# Patient Record
Sex: MALE | Race: WHITE | Employment: OTHER | RURAL
[De-identification: names, ages, dates, MRNs, and addresses within clinical notes are randomized per-mention and may not be internally consistent; named-entity substitution may affect disease eponyms.]

---

## 2017-02-06 ENCOUNTER — HOSPITAL ENCOUNTER (OUTPATIENT)
Dept: OTHER | Age: 82
Discharge: HOME | End: 2017-02-06
Attending: INTERNAL MEDICINE | Admitting: INTERNAL MEDICINE

## 2017-02-06 LAB
ALBUMIN SERPL-MCNC: 4.7 G/DL (ref 3.4–4.8)
ALP BLD-CCNC: 61 U/L (ref 25–100)
ALT SERPL-CCNC: 27 U/L (ref 4–36)
AST SERPL-CCNC: 23 U/L (ref 8–33)
BILIRUB SERPL-MCNC: 0.4 MG/DL (ref 0.3–1.2)
BILIRUBIN DIRECT: <0.2 MG/DL (ref 0–0.2)
BILIRUBIN, INDIRECT: NORMAL MG/DL (ref 0.2–0.8)
CHOLESTEROL, TOTAL: 128 MG/DL (ref 0–200)
HDLC SERPL-MCNC: 46 MG/DL (ref 40–60)
LDL CHOLESTEROL CALCULATED: 53 MG/DL
TOTAL PROTEIN: 7 G/DL (ref 6.4–8.3)
TRIGL SERPL-MCNC: 145 MG/DL (ref 0–249)
VLDLC SERPL CALC-MCNC: 29 MG/DL

## 2017-04-03 ENCOUNTER — HOSPITAL ENCOUNTER (OUTPATIENT)
Dept: OTHER | Age: 82
Discharge: HOME | End: 2017-04-03
Attending: FAMILY MEDICINE | Admitting: FAMILY MEDICINE

## 2017-04-03 DIAGNOSIS — R06.02 BREATH SHORTNESS: ICD-10-CM

## 2017-10-19 ENCOUNTER — OFFICE VISIT (OUTPATIENT)
Dept: CARDIOLOGY | Facility: CLINIC | Age: 82
End: 2017-10-19

## 2017-10-19 VITALS
SYSTOLIC BLOOD PRESSURE: 130 MMHG | OXYGEN SATURATION: 98 % | HEART RATE: 59 BPM | WEIGHT: 168.4 LBS | HEIGHT: 66 IN | BODY MASS INDEX: 27.06 KG/M2 | DIASTOLIC BLOOD PRESSURE: 70 MMHG

## 2017-10-19 DIAGNOSIS — E78.5 HYPERLIPIDEMIA LDL GOAL <100: ICD-10-CM

## 2017-10-19 DIAGNOSIS — R00.1 BRADYCARDIA: ICD-10-CM

## 2017-10-19 DIAGNOSIS — I48.20 CHRONIC ATRIAL FIBRILLATION (HCC): Primary | ICD-10-CM

## 2017-10-19 PROCEDURE — 99213 OFFICE O/P EST LOW 20 MIN: CPT | Performed by: INTERNAL MEDICINE

## 2017-10-19 RX ORDER — LACTOBACILLUS RHAMNOSUS GG 10B CELL
CAPSULE ORAL DAILY
COMMUNITY
End: 2018-08-26

## 2017-10-19 NOTE — PROGRESS NOTES
Sly Miranda  1935  [phone]      10/19/2017    Ahmet Benavidez MD    Chief Complaint   Patient presents with   • Atrial Fibrillation     PROBLEM LIST:  1.  Chronic atrial fibrillation:  a. MAXIM cardioversion, 10/22/2013,  Dr. Huynh:  LVEF 55% with trace TR, mild MR.  Successful external cardioversion to sinus rhythm.  Eliquis initiated with propafenone 150 mg q.12 h.   b. EKG, 02/24/2013, revealing normal sinus rhythm at a rate of 64 beats per minute with a normal  QTC.   c. CHADS score of 1.  d. Transesophageal echocardiogram in preparation for cardioversion, 09/09/2013, Dr. Huynh:  LVEF in the lower limits of normal with a large amount of spontaneous contrast and the appearance of loosely formed thrombus  in the left atrial appendage.  Eliquis initiated.  e. Propafenone discontinued November 2013 secondary to ineffectiveness.   2. Bradycardia:  a. Recorded in 2008 with a history of intolerance  to AV mele blocking agents.  3. Dyslipidemia, on statin therapy.  4. Abdominal pain prompting admission to [facility], 09/06/2013:  a.  CT scan of the abdomen and pelvis showing no acute abnormality, 09/06/2013.  5. Mesenteric calcified mass:  a. Recent CT scan, June 2010,  by Dr. Aguilera, unchanged from prior.  6. Solitary pulmonary nodule:  a. X ray chest pa and lateral, 9/6/2016:  Chronic change; no active disease.  7. Gastroesophageal reflux  disease.  8. Surgical history:  a. Hernia surgery, 10/20/1997.  b.  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 Diaz.      No Known Allergies    Current Medications:      Current Outpatient Prescriptions:   •  aspirin 81 MG EC tablet, Take 81 mg by mouth daily., Disp: , Rfl:   •  B Complex Vitamins (VITAMIN B COMPLEX) capsule capsule, Take  by mouth Daily., Disp: , Rfl:   •  CALCIUM-MAGNESIUM-VITAMIN D PO, Take  by mouth 2 (Two) Times a Day., Disp: , Rfl:   •  cholecalciferol (VITAMIN D3) 1000 UNITS tablet, Take  1,000 Units by mouth Daily., Disp: , Rfl:   •  Coenzyme Q10 (COQ10) 400 MG capsule, Take  by mouth Daily., Disp: , Rfl:   •  colestipol (COLESTID) 1 G tablet, 2 g Daily., Disp: , Rfl:   •  ELIQUIS 5 MG tablet tablet, Every 12 (Twelve) Hours., Disp: , Rfl:   •  Fexofenadine HCl (ALLEGRA PO), Take 180 mg by mouth Daily., Disp: , Rfl:   •  Glucosamine-Chondroitin-MSM ([employer] GLUCOSAMINE-CHONDROIT-MSM PO), Take  by mouth Daily., Disp: , Rfl:   •  methocarbamol (ROBAXIN) 500 MG tablet, Take 500 mg by mouth Daily As Needed for muscle spasms., Disp: , Rfl:   •  omeprazole (PriLOSEC) 40 MG capsule, Daily., Disp: , Rfl:   •  probiotic (CULTURELLE) capsule capsule, Take  by mouth Daily., Disp: , Rfl:   •  rosuvastatin (CRESTOR) 20 MG tablet, Take 1 tablet by mouth Daily., Disp: 90 tablet, Rfl: 1  •  triamcinolone (KENALOG) 0.1 % cream, As Needed., Disp: , Rfl:   •  Unable to find, 1 each 2 (Two) Times a Day. Med Name: Antiva, Disp: , Rfl:   •  vitamin A 8000 UNIT capsule, Take 8,000 Units by mouth Daily., Disp: , Rfl:   •  Zinc 50 MG capsule, Take  by mouth Daily., Disp: , Rfl:     HPI    Sly Miranda is a pleasant 82-year-old white male who presents today for 12 month follow up of chronic atrial fibrillation and dyslipidemia. Since last visit, patient has been doing well from a cardiac standpoint.  He admits that he notices occasional shortness of breath only when bending over to tie his shoes.  He never notices that with any exertional activity such as chopping wood, mowing the yard, or stacking wood.  He states that his blood pressures always run within normal parameters at home.  He had his cholesterol checked recently with his primary care physician.  He currently denies having any chest pain, dyspnea on exertion, palpitations, edema, fatigue, dizziness, and syncope.  Upon auscultation today he does sound to be in a bradycardic rate without evidence of any skipped beats or atrial fibrillation.  He states that he was  "told this previously in his primary care physician's office a couple months ago as well.  He will however, need to continue on all current medications in case he should happen to go back into atrial fibrillation.    The following portions of the patient's history were reviewed and updated as appropriate: allergies, current medications and problem list.    Pertinent positives as listed in the HPI.  All other systems reviewed are negative.    Vitals:    10/19/17 1038   BP: 130/70   BP Location: Left arm   Patient Position: Sitting   Pulse: 59   SpO2: 98%   Weight: 168 lb 6.4 oz (76.4 kg)   Height: 66\" (167.6 cm)       Physical Exam:  GENERAL: well-developed, well-nourished; in no acute distress.   NECK:  There is no jugular venous distention at 30°.  Carotid upstrokes are 2+ and  symmetrical without bruits.   LUNGS: Clear to auscultation bilaterally without wheezing, rhonchi, or rales noted.   CARDIOVASCULAR: The heart has a regular bradycardic rate with a normal S1 and S2. There is no murmur, gallop, rub, or click appreciated. The PMI is nondisplaced.   ABDOMEN: Soft and nontender  NEUROLOGICAL: Nonfocal; Alert and oriented  PERIPHERAL VASCULAR:  Posterior tibial and dorsalis pedis pulses are 2+ and symmetrical. There is no peripheral edema.   MUSCULOSKELETAL:  Normal ROM  SKIN:  Warm and dry  PSYCHIATRIC: normal mood and affect; behavior appropriate    Diagnostic Data:    Procedures    Assessment:      ICD-10-CM ICD-9-CM   1. Chronic atrial fibrillation- currently NSR I48.2 427.31   2. Hyperlipidemia LDL goal <100- per PCP E78.5 272.4   3. Bradycardia- asymptomatic R00.1 427.89       Plan:    1. Continue current medications.  2. F/up in 12 months or sooner if needed.    Seen independently by APRYL Rogers on October 19, 2017 at 1055      "

## 2017-12-18 ENCOUNTER — HOSPITAL ENCOUNTER (OUTPATIENT)
Dept: GENERAL RADIOLOGY | Age: 82
Discharge: HOME | End: 2017-12-18
Attending: FAMILY MEDICINE | Admitting: FAMILY MEDICINE

## 2017-12-18 DIAGNOSIS — R09.89 BRUIT: ICD-10-CM

## 2018-02-22 ENCOUNTER — TELEPHONE (OUTPATIENT)
Dept: CARDIOLOGY | Facility: CLINIC | Age: 83
End: 2018-02-22

## 2018-02-22 DIAGNOSIS — R94.31 ABNORMAL EKG: ICD-10-CM

## 2018-02-22 DIAGNOSIS — R00.0 TACHYCARDIA: Primary | ICD-10-CM

## 2018-02-22 RX ORDER — METOPROLOL SUCCINATE 25 MG/1
25 TABLET, EXTENDED RELEASE ORAL DAILY
Qty: 90 TABLET | Refills: 3 | Status: SHIPPED | OUTPATIENT
Start: 2018-02-22 | End: 2018-08-26

## 2018-02-22 NOTE — TELEPHONE ENCOUNTER
PT's wife calls to report that he has been having episodes of Heart Rates that are getting up to 160-173- they did not go tot he ER and they did not contact me until today- she reports that his HR today is 80-85 and he feels ok- I instructed her that anytime that his HR is greater than 100, she needs to contact me so that I can fax an order to [facility] for an EKG- if it happens to be after hours, they need to go to the ER for eval-     Want to add anything for rate?

## 2018-02-22 NOTE — TELEPHONE ENCOUNTER
Reviewed by Dr. Huynh- suggests Metoprolol XL 25 mg daily- med sent to pharmacy and PT's wife notified- she will call me if this does not help his episodes.

## 2018-03-05 ENCOUNTER — HOSPITAL ENCOUNTER (OUTPATIENT)
Dept: OTHER | Age: 83
Discharge: HOME | End: 2018-03-05
Attending: INTERNAL MEDICINE | Admitting: INTERNAL MEDICINE

## 2018-03-07 ENCOUNTER — OUTSIDE FACILITY SERVICE (OUTPATIENT)
Dept: CARDIOLOGY | Facility: CLINIC | Age: 83
End: 2018-03-07

## 2018-03-07 ENCOUNTER — HOSPITAL ENCOUNTER (OUTPATIENT)
Dept: NON INVASIVE DIAGNOSTICS | Age: 83
Discharge: HOME | End: 2018-03-07
Attending: INTERNAL MEDICINE | Admitting: INTERNAL MEDICINE

## 2018-03-07 DIAGNOSIS — R00.0 TACHYCARDIA: ICD-10-CM

## 2018-03-07 LAB
BUN BLDV-MCNC: 20 MG/DL (ref 6–20)
CREAT SERPL-MCNC: 1.2 MG/DL (ref 0.4–1.2)
GFR AFRICAN AMERICAN: >59
GFR NON-AFRICAN AMERICAN: 58

## 2018-03-07 PROCEDURE — 93306 TTE W/DOPPLER COMPLETE: CPT | Performed by: INTERNAL MEDICINE

## 2018-03-09 ENCOUNTER — HOSPITAL ENCOUNTER (OUTPATIENT)
Dept: OTHER | Age: 83
Discharge: HOME | End: 2018-03-09
Attending: INTERNAL MEDICINE | Admitting: INTERNAL MEDICINE

## 2018-03-09 LAB
A/G RATIO: 1.8 (ref 0.8–2)
ALBUMIN SERPL-MCNC: 4.3 G/DL (ref 3.4–4.8)
ALP BLD-CCNC: 65 U/L (ref 25–100)
ALT SERPL-CCNC: 23 U/L (ref 4–36)
ANION GAP SERPL CALCULATED.3IONS-SCNC: 13 MMOL/L (ref 3–16)
AST SERPL-CCNC: 19 U/L (ref 8–33)
BASOPHILS ABSOLUTE: 0 K/UL (ref 0–0.1)
BASOPHILS RELATIVE PERCENT: 0.6 %
BILIRUB SERPL-MCNC: 0.4 MG/DL (ref 0.3–1.2)
BUN BLDV-MCNC: 18 MG/DL (ref 6–20)
CALCIUM SERPL-MCNC: 9.1 MG/DL (ref 8.5–10.5)
CHLORIDE BLD-SCNC: 102 MMOL/L (ref 98–107)
CO2: 25 MMOL/L (ref 20–30)
CREAT SERPL-MCNC: 1.1 MG/DL (ref 0.4–1.2)
EOSINOPHILS ABSOLUTE: 0.3 K/UL (ref 0–0.4)
EOSINOPHILS RELATIVE PERCENT: 4.1 %
GFR AFRICAN AMERICAN: >59
GFR NON-AFRICAN AMERICAN: >59
GLOBULIN: 2.4 G/DL
GLUCOSE BLD-MCNC: 112 MG/DL (ref 74–106)
HCT VFR BLD CALC: 43.3 % (ref 40–54)
HEMOGLOBIN: 14.3 G/DL (ref 13–18)
IMMATURE GRANULOCYTES #: 0 K/UL
IMMATURE GRANULOCYTES %: 0.5 % (ref 0–5)
LYMPHOCYTES ABSOLUTE: 1.8 K/UL (ref 1.5–4)
LYMPHOCYTES RELATIVE PERCENT: 28.6 %
MCH RBC QN AUTO: 28.7 PG (ref 27–32)
MCHC RBC AUTO-ENTMCNC: 33 G/DL (ref 31–35)
MCV RBC AUTO: 86.8 FL (ref 80–100)
MONOCYTES ABSOLUTE: 0.7 K/UL (ref 0.2–0.8)
MONOCYTES RELATIVE PERCENT: 10.8 %
NEUTROPHILS ABSOLUTE: 3.5 K/UL (ref 2–7.5)
NEUTROPHILS RELATIVE PERCENT: 55.4 %
PDW BLD-RTO: 13.1 % (ref 11–16)
PLATELET # BLD: 235 K/UL (ref 150–400)
PMV BLD AUTO: 10.4 FL (ref 6–10)
POTASSIUM SERPL-SCNC: 4.7 MMOL/L (ref 3.4–5.1)
RBC # BLD: 4.99 M/UL (ref 4.5–6)
SODIUM BLD-SCNC: 140 MMOL/L (ref 136–145)
TOTAL PROTEIN: 6.7 G/DL (ref 6.4–8.3)
WBC # BLD: 6.4 K/UL (ref 4–11)

## 2018-03-09 RX ORDER — DIGOXIN 125 MCG
125 TABLET ORAL DAILY
Qty: 30 TABLET | Refills: 11 | Status: SHIPPED | OUTPATIENT
Start: 2018-03-09 | End: 2018-08-26

## 2018-03-12 ENCOUNTER — TELEPHONE (OUTPATIENT)
Dept: CARDIOLOGY | Facility: CLINIC | Age: 83
End: 2018-03-12

## 2018-03-12 NOTE — TELEPHONE ENCOUNTER
PT did not start digoxin over the weekend bc his HR was running 55-65 and he felt good- he will continue Metoprolol as ordered and call me if he needs anything further-

## 2018-03-13 NOTE — TELEPHONE ENCOUNTER
"PT's wife calls to report that his HR has been running 40's - she states that he has been feeling a little dizzy but didn't know if it was the med or his \"prostate pill\" that he is taking- will have him to decrease Metoprolol to 25 mg 1/2 pill daily and call me next week or sooner with an update  "

## 2018-03-20 ENCOUNTER — OUTSIDE FACILITY SERVICE (OUTPATIENT)
Dept: CARDIOLOGY | Facility: CLINIC | Age: 83
End: 2018-03-20

## 2018-03-20 ENCOUNTER — HOSPITAL ENCOUNTER (OUTPATIENT)
Dept: OTHER | Age: 83
Discharge: HOME | End: 2018-03-20
Attending: INTERNAL MEDICINE | Admitting: INTERNAL MEDICINE

## 2018-03-20 PROCEDURE — 93010 ELECTROCARDIOGRAM REPORT: CPT | Performed by: INTERNAL MEDICINE

## 2018-03-22 ENCOUNTER — TELEPHONE (OUTPATIENT)
Dept: CARDIOLOGY | Facility: CLINIC | Age: 83
End: 2018-03-22

## 2018-03-22 NOTE — TELEPHONE ENCOUNTER
Sent PT for EKG because he was having HR in the 40's - Dr. Huynh reviewed EKG and suggests stopping Metoprolol and he may take as needed for tachycardia- relayed to PT/wife and she verbalized understanding-

## 2018-08-26 ENCOUNTER — APPOINTMENT (OUTPATIENT)
Dept: GENERAL RADIOLOGY | Facility: HOSPITAL | Age: 83
End: 2018-08-26

## 2018-08-26 ENCOUNTER — HOSPITAL ENCOUNTER (EMERGENCY)
Facility: HOSPITAL | Age: 83
Discharge: HOME | End: 2018-08-26
Attending: EMERGENCY MEDICINE | Admitting: EMERGENCY MEDICINE

## 2018-08-26 VITALS
DIASTOLIC BLOOD PRESSURE: 57 MMHG | HEIGHT: 67 IN | BODY MASS INDEX: 26.68 KG/M2 | TEMPERATURE: 97.8 F | RESPIRATION RATE: 20 BRPM | OXYGEN SATURATION: 99 % | SYSTOLIC BLOOD PRESSURE: 89 MMHG | WEIGHT: 170 LBS | HEART RATE: 54 BPM

## 2018-08-26 DIAGNOSIS — I48.91 ATRIAL FIBRILLATION, UNSPECIFIED TYPE (HCC): ICD-10-CM

## 2018-08-26 DIAGNOSIS — I48.20 CHRONIC ATRIAL FIBRILLATION (HCC): Primary | ICD-10-CM

## 2018-08-26 LAB
ALBUMIN SERPL-MCNC: 4.33 G/DL (ref 3.2–4.8)
ALBUMIN/GLOB SERPL: 1.7 G/DL (ref 1.5–2.5)
ALP SERPL-CCNC: 68 U/L (ref 25–100)
ALT SERPL W P-5'-P-CCNC: 43 U/L (ref 7–40)
ANION GAP SERPL CALCULATED.3IONS-SCNC: 10 MMOL/L (ref 3–11)
AST SERPL-CCNC: 32 U/L (ref 0–33)
BASOPHILS # BLD AUTO: 0.03 10*3/MM3 (ref 0–0.2)
BASOPHILS NFR BLD AUTO: 0.4 % (ref 0–1)
BILIRUB SERPL-MCNC: 0.4 MG/DL (ref 0.3–1.2)
BNP SERPL-MCNC: 139 PG/ML (ref 0–100)
BUN BLD-MCNC: 15 MG/DL (ref 9–23)
BUN/CREAT SERPL: 13.6 (ref 7–25)
CALCIUM SPEC-SCNC: 9.2 MG/DL (ref 8.7–10.4)
CHLORIDE SERPL-SCNC: 107 MMOL/L (ref 99–109)
CO2 SERPL-SCNC: 24 MMOL/L (ref 20–31)
CREAT BLD-MCNC: 1.1 MG/DL (ref 0.6–1.3)
DEPRECATED RDW RBC AUTO: 41.7 FL (ref 37–54)
DIGOXIN SERPL-MCNC: 0.06 NG/ML (ref 0.8–2)
EOSINOPHIL # BLD AUTO: 0.23 10*3/MM3 (ref 0–0.3)
EOSINOPHIL NFR BLD AUTO: 2.7 % (ref 0–3)
ERYTHROCYTE [DISTWIDTH] IN BLOOD BY AUTOMATED COUNT: 13.5 % (ref 11.3–14.5)
GFR SERPL CREATININE-BSD FRML MDRD: 64 ML/MIN/1.73
GLOBULIN UR ELPH-MCNC: 2.6 GM/DL
GLUCOSE BLD-MCNC: 111 MG/DL (ref 70–100)
HCT VFR BLD AUTO: 46.6 % (ref 38.9–50.9)
HGB BLD-MCNC: 15.8 G/DL (ref 13.1–17.5)
HOLD SPECIMEN: NORMAL
HOLD SPECIMEN: NORMAL
IMM GRANULOCYTES # BLD: 0.02 10*3/MM3 (ref 0–0.03)
IMM GRANULOCYTES NFR BLD: 0.2 % (ref 0–0.6)
LYMPHOCYTES # BLD AUTO: 1.93 10*3/MM3 (ref 0.6–4.8)
LYMPHOCYTES NFR BLD AUTO: 23 % (ref 24–44)
MAGNESIUM SERPL-MCNC: 2 MG/DL (ref 1.3–2.7)
MCH RBC QN AUTO: 28.9 PG (ref 27–31)
MCHC RBC AUTO-ENTMCNC: 33.9 G/DL (ref 32–36)
MCV RBC AUTO: 85.2 FL (ref 80–99)
MONOCYTES # BLD AUTO: 0.9 10*3/MM3 (ref 0–1)
MONOCYTES NFR BLD AUTO: 10.7 % (ref 0–12)
NEUTROPHILS # BLD AUTO: 5.29 10*3/MM3 (ref 1.5–8.3)
NEUTROPHILS NFR BLD AUTO: 63.2 % (ref 41–71)
PLATELET # BLD AUTO: 222 10*3/MM3 (ref 150–450)
PMV BLD AUTO: 10.3 FL (ref 6–12)
POTASSIUM BLD-SCNC: 4.2 MMOL/L (ref 3.5–5.5)
PROT SERPL-MCNC: 6.9 G/DL (ref 5.7–8.2)
RBC # BLD AUTO: 5.47 10*6/MM3 (ref 4.2–5.76)
SODIUM BLD-SCNC: 141 MMOL/L (ref 132–146)
TROPONIN I SERPL-MCNC: 0.01 NG/ML (ref 0–0.07)
TROPONIN I SERPL-MCNC: 0.06 NG/ML (ref 0–0.07)
TSH SERPL DL<=0.05 MIU/L-ACNC: 3.34 MIU/ML (ref 0.35–5.35)
WBC NRBC COR # BLD: 8.38 10*3/MM3 (ref 3.5–10.8)
WHOLE BLOOD HOLD SPECIMEN: NORMAL
WHOLE BLOOD HOLD SPECIMEN: NORMAL

## 2018-08-26 PROCEDURE — 83880 ASSAY OF NATRIURETIC PEPTIDE: CPT | Performed by: EMERGENCY MEDICINE

## 2018-08-26 PROCEDURE — 25010000002 ADENOSINE PER 6 MG

## 2018-08-26 PROCEDURE — 99284 EMERGENCY DEPT VISIT MOD MDM: CPT

## 2018-08-26 PROCEDURE — 80162 ASSAY OF DIGOXIN TOTAL: CPT | Performed by: EMERGENCY MEDICINE

## 2018-08-26 PROCEDURE — 96365 THER/PROPH/DIAG IV INF INIT: CPT

## 2018-08-26 PROCEDURE — 96376 TX/PRO/DX INJ SAME DRUG ADON: CPT

## 2018-08-26 PROCEDURE — 80053 COMPREHEN METABOLIC PANEL: CPT | Performed by: EMERGENCY MEDICINE

## 2018-08-26 PROCEDURE — 84443 ASSAY THYROID STIM HORMONE: CPT | Performed by: EMERGENCY MEDICINE

## 2018-08-26 PROCEDURE — 99284 EMERGENCY DEPT VISIT MOD MDM: CPT | Performed by: INTERNAL MEDICINE

## 2018-08-26 PROCEDURE — 96366 THER/PROPH/DIAG IV INF ADDON: CPT

## 2018-08-26 PROCEDURE — 84484 ASSAY OF TROPONIN QUANT: CPT

## 2018-08-26 PROCEDURE — 85025 COMPLETE CBC W/AUTO DIFF WBC: CPT | Performed by: EMERGENCY MEDICINE

## 2018-08-26 PROCEDURE — 93005 ELECTROCARDIOGRAM TRACING: CPT | Performed by: EMERGENCY MEDICINE

## 2018-08-26 PROCEDURE — 83735 ASSAY OF MAGNESIUM: CPT | Performed by: EMERGENCY MEDICINE

## 2018-08-26 PROCEDURE — 71045 X-RAY EXAM CHEST 1 VIEW: CPT

## 2018-08-26 RX ORDER — FLECAINIDE ACETATE 50 MG/1
50 TABLET ORAL 2 TIMES DAILY
Qty: 180 TABLET | Refills: 3 | Status: SHIPPED | OUTPATIENT
Start: 2018-08-26 | End: 2018-09-07

## 2018-08-26 RX ORDER — ESMOLOL HYDROCHLORIDE 10 MG/ML
500 INJECTION INTRAVENOUS ONCE
Status: DISCONTINUED | OUTPATIENT
Start: 2018-08-26 | End: 2018-08-26

## 2018-08-26 RX ORDER — PRAMIPEXOLE DIHYDROCHLORIDE 0.25 MG/1
0.25 TABLET ORAL NIGHTLY
COMMUNITY
End: 2021-04-01

## 2018-08-26 RX ORDER — SODIUM CHLORIDE 0.9 % (FLUSH) 0.9 %
10 SYRINGE (ML) INJECTION AS NEEDED
Status: DISCONTINUED | OUTPATIENT
Start: 2018-08-26 | End: 2018-08-26 | Stop reason: HOSPADM

## 2018-08-26 RX ORDER — ESMOLOL HYDROCHLORIDE 10 MG/ML
50-300 INJECTION, SOLUTION INTRAVENOUS
Status: DISCONTINUED | OUTPATIENT
Start: 2018-08-26 | End: 2018-08-26

## 2018-08-26 RX ORDER — DILTIAZEM HCL IN NACL,ISO-OSM 125 MG/125
5 PLASTIC BAG, INJECTION (ML) INTRAVENOUS
Status: DISCONTINUED | OUTPATIENT
Start: 2018-08-26 | End: 2018-08-26 | Stop reason: HOSPADM

## 2018-08-26 RX ORDER — FLECAINIDE ACETATE 150 MG/1
300 TABLET ORAL EVERY 12 HOURS SCHEDULED
Status: DISCONTINUED | OUTPATIENT
Start: 2018-08-26 | End: 2018-08-26

## 2018-08-26 RX ORDER — FLECAINIDE ACETATE 150 MG/1
300 TABLET ORAL ONCE
Status: COMPLETED | OUTPATIENT
Start: 2018-08-26 | End: 2018-08-26

## 2018-08-26 RX ORDER — ADENOSINE 3 MG/ML
INJECTION, SOLUTION INTRAVENOUS
Status: COMPLETED
Start: 2018-08-26 | End: 2018-08-26

## 2018-08-26 RX ORDER — SILODOSIN 8 MG/1
8 CAPSULE ORAL
COMMUNITY
End: 2023-04-04

## 2018-08-26 RX ORDER — DILTIAZEM HYDROCHLORIDE 5 MG/ML
5 INJECTION INTRAVENOUS ONCE
Status: COMPLETED | OUTPATIENT
Start: 2018-08-26 | End: 2018-08-26

## 2018-08-26 RX ORDER — DILTIAZEM HCL IN NACL,ISO-OSM 125 MG/125
10 PLASTIC BAG, INJECTION (ML) INTRAVENOUS ONCE
Status: DISCONTINUED | OUTPATIENT
Start: 2018-08-26 | End: 2018-08-26

## 2018-08-26 RX ORDER — DILTIAZEM HCL IN NACL,ISO-OSM 125 MG/125
10 PLASTIC BAG, INJECTION (ML) INTRAVENOUS
Status: DISCONTINUED | OUTPATIENT
Start: 2018-08-26 | End: 2018-08-26

## 2018-08-26 RX ADMIN — ADENOSINE 6 MG: 3 INJECTION, SOLUTION INTRAVENOUS at 11:35

## 2018-08-26 RX ADMIN — DILTIAZEM HYDROCHLORIDE 5 MG/HR: 5 INJECTION INTRAVENOUS at 13:10

## 2018-08-26 RX ADMIN — DILTIAZEM HYDROCHLORIDE 5 MG: 5 INJECTION INTRAVENOUS at 11:54

## 2018-08-26 RX ADMIN — ADENOSINE 12 MG: 3 INJECTION, SOLUTION INTRAVENOUS at 11:41

## 2018-08-26 RX ADMIN — FLECAINIDE ACETATE 300 MG: 150 TABLET ORAL at 13:06

## 2018-08-28 ENCOUNTER — OUTSIDE FACILITY SERVICE (OUTPATIENT)
Dept: CARDIOLOGY | Facility: CLINIC | Age: 83
End: 2018-08-28

## 2018-08-28 ENCOUNTER — HOSPITAL ENCOUNTER (OUTPATIENT)
Facility: HOSPITAL | Age: 83
Discharge: HOME | End: 2018-08-28
Payer: MEDICARE

## 2018-08-28 PROCEDURE — 93000 ELECTROCARDIOGRAM COMPLETE: CPT | Performed by: INTERNAL MEDICINE

## 2018-08-29 ENCOUNTER — TELEPHONE (OUTPATIENT)
Dept: CARDIOLOGY | Facility: CLINIC | Age: 83
End: 2018-08-29

## 2018-08-29 NOTE — TELEPHONE ENCOUNTER
PT's HR is down in the 40's- he is taking Flecainide 50 mg BID and Lopressor 25 mg BID-  Will him to decrease to 12.5 mg BID-

## 2018-09-07 ENCOUNTER — TELEPHONE (OUTPATIENT)
Dept: CARDIOLOGY | Facility: CLINIC | Age: 83
End: 2018-09-07

## 2018-09-07 ENCOUNTER — HOSPITAL ENCOUNTER (INPATIENT)
Facility: HOSPITAL | Age: 83
LOS: 4 days | Discharge: HOME | End: 2018-09-11
Attending: EMERGENCY MEDICINE | Admitting: INTERNAL MEDICINE

## 2018-09-07 DIAGNOSIS — R55 SYNCOPE, UNSPECIFIED SYNCOPE TYPE: Primary | ICD-10-CM

## 2018-09-07 DIAGNOSIS — I48.0 PAROXYSMAL ATRIAL FIBRILLATION (HCC): ICD-10-CM

## 2018-09-07 LAB
ALBUMIN SERPL-MCNC: 4.35 G/DL (ref 3.2–4.8)
ALBUMIN/GLOB SERPL: 1.8 G/DL (ref 1.5–2.5)
ALP SERPL-CCNC: 64 U/L (ref 25–100)
ALT SERPL W P-5'-P-CCNC: 44 U/L (ref 7–40)
ANION GAP SERPL CALCULATED.3IONS-SCNC: 6 MMOL/L (ref 3–11)
ANION GAP SERPL CALCULATED.3IONS-SCNC: 7 MMOL/L (ref 3–11)
AST SERPL-CCNC: 34 U/L (ref 0–33)
BASOPHILS # BLD AUTO: 0.04 10*3/MM3 (ref 0–0.2)
BASOPHILS NFR BLD AUTO: 0.4 % (ref 0–1)
BILIRUB SERPL-MCNC: 0.4 MG/DL (ref 0.3–1.2)
BUN BLD-MCNC: 19 MG/DL (ref 9–23)
BUN BLD-MCNC: 19 MG/DL (ref 9–23)
BUN/CREAT SERPL: 17.1 (ref 7–25)
BUN/CREAT SERPL: 17.1 (ref 7–25)
CALCIUM SPEC-SCNC: 8.6 MG/DL (ref 8.7–10.4)
CALCIUM SPEC-SCNC: 8.9 MG/DL (ref 8.7–10.4)
CHLORIDE SERPL-SCNC: 103 MMOL/L (ref 99–109)
CHLORIDE SERPL-SCNC: 104 MMOL/L (ref 99–109)
CO2 SERPL-SCNC: 25 MMOL/L (ref 20–31)
CO2 SERPL-SCNC: 27 MMOL/L (ref 20–31)
CREAT BLD-MCNC: 1.11 MG/DL (ref 0.6–1.3)
CREAT BLD-MCNC: 1.11 MG/DL (ref 0.6–1.3)
DEPRECATED RDW RBC AUTO: 40.7 FL (ref 37–54)
EOSINOPHIL # BLD AUTO: 0.13 10*3/MM3 (ref 0–0.3)
EOSINOPHIL NFR BLD AUTO: 1.3 % (ref 0–3)
ERYTHROCYTE [DISTWIDTH] IN BLOOD BY AUTOMATED COUNT: 13.3 % (ref 11.3–14.5)
GFR SERPL CREATININE-BSD FRML MDRD: 63 ML/MIN/1.73
GFR SERPL CREATININE-BSD FRML MDRD: 63 ML/MIN/1.73
GLOBULIN UR ELPH-MCNC: 2.5 GM/DL
GLUCOSE BLD-MCNC: 106 MG/DL (ref 70–100)
GLUCOSE BLD-MCNC: 110 MG/DL (ref 70–100)
HCT VFR BLD AUTO: 46.8 % (ref 38.9–50.9)
HGB BLD-MCNC: 15.9 G/DL (ref 13.1–17.5)
HOLD SPECIMEN: NORMAL
HOLD SPECIMEN: NORMAL
IMM GRANULOCYTES # BLD: 0.02 10*3/MM3 (ref 0–0.03)
IMM GRANULOCYTES NFR BLD: 0.2 % (ref 0–0.6)
LYMPHOCYTES # BLD AUTO: 1.59 10*3/MM3 (ref 0.6–4.8)
LYMPHOCYTES NFR BLD AUTO: 16.5 % (ref 24–44)
MAGNESIUM SERPL-MCNC: 1.9 MG/DL (ref 1.3–2.7)
MAGNESIUM SERPL-MCNC: 1.9 MG/DL (ref 1.3–2.7)
MCH RBC QN AUTO: 28.7 PG (ref 27–31)
MCHC RBC AUTO-ENTMCNC: 34 G/DL (ref 32–36)
MCV RBC AUTO: 84.5 FL (ref 80–99)
MONOCYTES # BLD AUTO: 0.86 10*3/MM3 (ref 0–1)
MONOCYTES NFR BLD AUTO: 8.9 % (ref 0–12)
NEUTROPHILS # BLD AUTO: 7.04 10*3/MM3 (ref 1.5–8.3)
NEUTROPHILS NFR BLD AUTO: 72.9 % (ref 41–71)
PLATELET # BLD AUTO: 209 10*3/MM3 (ref 150–450)
PMV BLD AUTO: 10.1 FL (ref 6–12)
POTASSIUM BLD-SCNC: 4 MMOL/L (ref 3.5–5.5)
POTASSIUM BLD-SCNC: 4.2 MMOL/L (ref 3.5–5.5)
PROT SERPL-MCNC: 6.8 G/DL (ref 5.7–8.2)
RBC # BLD AUTO: 5.54 10*6/MM3 (ref 4.2–5.76)
SODIUM BLD-SCNC: 136 MMOL/L (ref 132–146)
SODIUM BLD-SCNC: 136 MMOL/L (ref 132–146)
TROPONIN I SERPL-MCNC: 0 NG/ML (ref 0–0.07)
TROPONIN I SERPL-MCNC: 0.01 NG/ML
WBC NRBC COR # BLD: 9.66 10*3/MM3 (ref 3.5–10.8)
WHOLE BLOOD HOLD SPECIMEN: NORMAL
WHOLE BLOOD HOLD SPECIMEN: NORMAL

## 2018-09-07 PROCEDURE — 99223 1ST HOSP IP/OBS HIGH 75: CPT | Performed by: INTERNAL MEDICINE

## 2018-09-07 PROCEDURE — 83735 ASSAY OF MAGNESIUM: CPT | Performed by: INTERNAL MEDICINE

## 2018-09-07 PROCEDURE — 84484 ASSAY OF TROPONIN QUANT: CPT | Performed by: EMERGENCY MEDICINE

## 2018-09-07 PROCEDURE — 83735 ASSAY OF MAGNESIUM: CPT | Performed by: EMERGENCY MEDICINE

## 2018-09-07 PROCEDURE — 93005 ELECTROCARDIOGRAM TRACING: CPT

## 2018-09-07 PROCEDURE — 80053 COMPREHEN METABOLIC PANEL: CPT | Performed by: EMERGENCY MEDICINE

## 2018-09-07 PROCEDURE — 85025 COMPLETE CBC W/AUTO DIFF WBC: CPT | Performed by: EMERGENCY MEDICINE

## 2018-09-07 PROCEDURE — 93005 ELECTROCARDIOGRAM TRACING: CPT | Performed by: EMERGENCY MEDICINE

## 2018-09-07 PROCEDURE — 99285 EMERGENCY DEPT VISIT HI MDM: CPT

## 2018-09-07 PROCEDURE — 84484 ASSAY OF TROPONIN QUANT: CPT

## 2018-09-07 RX ORDER — POTASSIUM CHLORIDE 750 MG/1
40 CAPSULE, EXTENDED RELEASE ORAL AS NEEDED
Status: DISCONTINUED | OUTPATIENT
Start: 2018-09-07 | End: 2018-09-11 | Stop reason: HOSPADM

## 2018-09-07 RX ORDER — ROSUVASTATIN CALCIUM 20 MG/1
20 TABLET, COATED ORAL DAILY
Status: DISCONTINUED | OUTPATIENT
Start: 2018-09-08 | End: 2018-09-11 | Stop reason: HOSPADM

## 2018-09-07 RX ORDER — FEXOFENADINE HCL 180 MG/1
180 TABLET ORAL DAILY
COMMUNITY
End: 2018-09-18

## 2018-09-07 RX ORDER — PRAMIPEXOLE DIHYDROCHLORIDE 0.25 MG/1
0.25 TABLET ORAL NIGHTLY
Status: DISCONTINUED | OUTPATIENT
Start: 2018-09-07 | End: 2018-09-11 | Stop reason: HOSPADM

## 2018-09-07 RX ORDER — CETIRIZINE HYDROCHLORIDE 10 MG/1
10 TABLET ORAL DAILY
Status: DISCONTINUED | OUTPATIENT
Start: 2018-09-08 | End: 2018-09-11 | Stop reason: HOSPADM

## 2018-09-07 RX ORDER — POTASSIUM CHLORIDE 1.5 G/1.77G
40 POWDER, FOR SOLUTION ORAL AS NEEDED
Status: DISCONTINUED | OUTPATIENT
Start: 2018-09-07 | End: 2018-09-11 | Stop reason: HOSPADM

## 2018-09-07 RX ORDER — MAGNESIUM SULFATE HEPTAHYDRATE 40 MG/ML
4 INJECTION, SOLUTION INTRAVENOUS AS NEEDED
Status: DISCONTINUED | OUTPATIENT
Start: 2018-09-07 | End: 2018-09-11 | Stop reason: HOSPADM

## 2018-09-07 RX ORDER — MELATONIN
1000 DAILY
Status: DISCONTINUED | OUTPATIENT
Start: 2018-09-08 | End: 2018-09-11 | Stop reason: HOSPADM

## 2018-09-07 RX ORDER — MAGNESIUM SULFATE HEPTAHYDRATE 40 MG/ML
2 INJECTION, SOLUTION INTRAVENOUS AS NEEDED
Status: DISCONTINUED | OUTPATIENT
Start: 2018-09-07 | End: 2018-09-11 | Stop reason: HOSPADM

## 2018-09-07 RX ORDER — SODIUM CHLORIDE 0.9 % (FLUSH) 0.9 %
1-10 SYRINGE (ML) INJECTION AS NEEDED
Status: DISCONTINUED | OUTPATIENT
Start: 2018-09-07 | End: 2018-09-11 | Stop reason: HOSPADM

## 2018-09-07 RX ORDER — TAMSULOSIN HYDROCHLORIDE 0.4 MG/1
0.4 CAPSULE ORAL NIGHTLY
Status: DISCONTINUED | OUTPATIENT
Start: 2018-09-07 | End: 2018-09-11 | Stop reason: HOSPADM

## 2018-09-07 RX ORDER — SODIUM CHLORIDE 0.9 % (FLUSH) 0.9 %
10 SYRINGE (ML) INJECTION AS NEEDED
Status: DISCONTINUED | OUTPATIENT
Start: 2018-09-07 | End: 2018-09-11 | Stop reason: HOSPADM

## 2018-09-07 RX ORDER — DOFETILIDE 0.25 MG/1
250 CAPSULE ORAL EVERY 12 HOURS SCHEDULED
Status: DISCONTINUED | OUTPATIENT
Start: 2018-09-07 | End: 2018-09-11 | Stop reason: HOSPADM

## 2018-09-07 RX ORDER — PANTOPRAZOLE SODIUM 40 MG/1
40 TABLET, DELAYED RELEASE ORAL EVERY MORNING
Status: DISCONTINUED | OUTPATIENT
Start: 2018-09-08 | End: 2018-09-11 | Stop reason: HOSPADM

## 2018-09-07 RX ORDER — ASPIRIN 81 MG/1
81 TABLET ORAL DAILY
Status: DISCONTINUED | OUTPATIENT
Start: 2018-09-08 | End: 2018-09-11 | Stop reason: HOSPADM

## 2018-09-07 RX ADMIN — APIXABAN 5 MG: 5 TABLET, FILM COATED ORAL at 20:36

## 2018-09-07 RX ADMIN — TAMSULOSIN HYDROCHLORIDE 0.4 MG: 0.4 CAPSULE ORAL at 20:36

## 2018-09-07 RX ADMIN — PRAMIPEXOLE DIHYDROCHLORIDE 0.25 MG: 0.25 TABLET ORAL at 20:36

## 2018-09-07 RX ADMIN — DOFETILIDE 250 MCG: 0.25 CAPSULE ORAL at 20:36

## 2018-09-07 NOTE — ED PROVIDER NOTES
Subjective   Sly Miranda is a 83 y.o.male who presents to the ED with his family by EMS with c/o syncope with onset at 1100 that has resolved. He was seen here at [facility] ED on 8/26/18 for chronic A-fib. He was at his follow up appointment at his PCP's office. He was laying down after receiving an EKG then experienced a warm sensation then states that everything went bright then dark then had a syncopal episode. He states that it felt like a few seconds but his family is unsure how long the episode lasted. Per his wife, his PCP walked into the room has then event was going on. She states that she saw tachycardia on the monitor and that he was very pale with blue lips. He came to on the table with his PCP at his side. He experienced slight dyspnea but denies any chest pain, palpitations, diaphoresis, nausea or abdominal pain. He has a Zio patch on and has an appointment with Dr. Huynh, Cardiology on November 1, 2018. This is his first syncopal episode. He has a h/o Afib on Eliquis.               History provided by:  Patient and spouse  Syncope   Episode history:  Single  Most recent episode:  Today  Timing:  Constant  Progression:  Resolved  Chronicity:  New  Context: sitting down    Context: not medication change    Witnessed: yes    Relieved by:  Nothing  Worsened by:  Nothing  Ineffective treatments:  None tried  Associated symptoms: shortness of breath    Associated symptoms: no chest pain, no diaphoresis, no nausea, no palpitations and no vomiting    Risk factors: no congenital heart disease, no coronary artery disease and no seizures    Risk factors comment:  Afib      Review of Systems   Constitutional: Negative for diaphoresis.   Eyes: Positive for visual disturbance.   Respiratory: Positive for shortness of breath.    Cardiovascular: Positive for syncope. Negative for chest pain, palpitations and leg swelling.   Gastrointestinal: Negative for abdominal pain, nausea and vomiting.   Skin: Positive  for pallor.   Neurological: Positive for syncope.   All other systems reviewed and are negative.      Past Medical History:   Diagnosis Date   • Bradycardia     Recorded in 2008 with a history of intolerance to AV mele blocking agents.   • Calcified mesenteric mass     Recent CT scan, June 2010, by Dr. Aguilera, unchanged from prior.   • Chronic atrial fibrillation (CMS/HCC)     on Eliquis 5 mg b.i.d.    • Dyslipidemia     , followed by Dr. Benavidez. on statin therapy.   • GERD (gastroesophageal reflux disease)        No Known Allergies    Past Surgical History:   Procedure Laterality Date   • HERNIA REPAIR  10/20/1997   • KNEE SURGERY Left    • TOTAL KNEE ARTHROPLASTY Right 03/06/2014    Right partial total knee replacement       History reviewed. No pertinent family history.    Social History     Social History   • Marital status:      Social History Main Topics   • Smoking status: Former Smoker     Types: Cigars   • Smokeless tobacco: Never Used      Comment: quit over 40 years ago   • Alcohol use No   • Drug use: No     Other Topics Concern   • Not on file         Objective   Physical Exam   Constitutional: He is oriented to person, place, and time. He appears well-developed and well-nourished. No distress.   HENT:   Head: Normocephalic and atraumatic.   Right Ear: External ear normal.   Left Ear: External ear normal.   Nose: Nose normal.   Mouth/Throat: Oropharynx is clear and moist.   Eyes: Conjunctivae are normal. No scleral icterus.   Neck: Normal range of motion. Neck supple.   Cardiovascular: Normal rate, normal heart sounds and intact distal pulses.  An irregular rhythm present. Exam reveals no friction rub.    No murmur heard.  Mildly irregular rhythm.    Pulmonary/Chest: Effort normal and breath sounds normal. No respiratory distress. He has no wheezes. He has no rales.   Abdominal: Soft. Bowel sounds are normal. He exhibits no distension. There is no tenderness.   Musculoskeletal: Normal range  of motion. He exhibits no edema or tenderness.   No pretibial edema    Neurological: He is alert and oriented to person, place, and time.   Skin: Skin is warm and dry. He is not diaphoretic.   Psychiatric: He has a normal mood and affect. His behavior is normal.   Nursing note and vitals reviewed.      Procedures         ED Course  ED Course as of Sep 07 2304   Fri Sep 07, 2018   1406 Dr. Edge is updating the patient.   [ML]   1435 Dr. Edge spoke with Dr. Irvin.   [ML]   1537 Dr. Edge spoke with Karon Capellan NP for Dr. Huynh, Cardiology. She will arrange a MAXIM.   [ML]   1543 Dr. Edge consulted cardiology.   [ML]      ED Course User Index  [ML] Albert Tan      Recent Results (from the past 24 hour(s))   Comprehensive Metabolic Panel    Collection Time: 09/07/18  1:02 PM   Result Value Ref Range    Glucose 110 (H) 70 - 100 mg/dL    BUN 19 9 - 23 mg/dL    Creatinine 1.11 0.60 - 1.30 mg/dL    Sodium 136 132 - 146 mmol/L    Potassium 4.2 3.5 - 5.5 mmol/L    Chloride 104 99 - 109 mmol/L    CO2 25.0 20.0 - 31.0 mmol/L    Calcium 8.9 8.7 - 10.4 mg/dL    Total Protein 6.8 5.7 - 8.2 g/dL    Albumin 4.35 3.20 - 4.80 g/dL    ALT (SGPT) 44 (H) 7 - 40 U/L    AST (SGOT) 34 (H) 0 - 33 U/L    Alkaline Phosphatase 64 25 - 100 U/L    Total Bilirubin 0.4 0.3 - 1.2 mg/dL    eGFR Non African Amer 63 >60 mL/min/1.73    Globulin 2.5 gm/dL    A/G Ratio 1.8 1.5 - 2.5 g/dL    BUN/Creatinine Ratio 17.1 7.0 - 25.0    Anion Gap 7.0 3.0 - 11.0 mmol/L   Magnesium    Collection Time: 09/07/18  1:02 PM   Result Value Ref Range    Magnesium 1.9 1.3 - 2.7 mg/dL   Light Blue Top    Collection Time: 09/07/18  1:02 PM   Result Value Ref Range    Extra Tube hold for add-on    Green Top (Gel)    Collection Time: 09/07/18  1:02 PM   Result Value Ref Range    Extra Tube Hold for add-ons.    Lavender Top    Collection Time: 09/07/18  1:02 PM   Result Value Ref Range    Extra Tube hold for add-on    Gold Top - SST    Collection Time:  09/07/18  1:02 PM   Result Value Ref Range    Extra Tube Hold for add-ons.    CBC Auto Differential    Collection Time: 09/07/18  1:02 PM   Result Value Ref Range    WBC 9.66 3.50 - 10.80 10*3/mm3    RBC 5.54 4.20 - 5.76 10*6/mm3    Hemoglobin 15.9 13.1 - 17.5 g/dL    Hematocrit 46.8 38.9 - 50.9 %    MCV 84.5 80.0 - 99.0 fL    MCH 28.7 27.0 - 31.0 pg    MCHC 34.0 32.0 - 36.0 g/dL    RDW 13.3 11.3 - 14.5 %    RDW-SD 40.7 37.0 - 54.0 fl    MPV 10.1 6.0 - 12.0 fL    Platelets 209 150 - 450 10*3/mm3    Neutrophil % 72.9 (H) 41.0 - 71.0 %    Lymphocyte % 16.5 (L) 24.0 - 44.0 %    Monocyte % 8.9 0.0 - 12.0 %    Eosinophil % 1.3 0.0 - 3.0 %    Basophil % 0.4 0.0 - 1.0 %    Immature Grans % 0.2 0.0 - 0.6 %    Neutrophils, Absolute 7.04 1.50 - 8.30 10*3/mm3    Lymphocytes, Absolute 1.59 0.60 - 4.80 10*3/mm3    Monocytes, Absolute 0.86 0.00 - 1.00 10*3/mm3    Eosinophils, Absolute 0.13 0.00 - 0.30 10*3/mm3    Basophils, Absolute 0.04 0.00 - 0.20 10*3/mm3    Immature Grans, Absolute 0.02 0.00 - 0.03 10*3/mm3   POC Troponin, Rapid    Collection Time: 09/07/18  1:14 PM   Result Value Ref Range    Troponin I 0.00 0.00 - 0.07 ng/mL   Troponin    Collection Time: 09/07/18  3:33 PM   Result Value Ref Range    Troponin I 0.010 <=0.039 ng/mL   Basic Metabolic Panel    Collection Time: 09/07/18  7:27 PM   Result Value Ref Range    Glucose 106 (H) 70 - 100 mg/dL    BUN 19 9 - 23 mg/dL    Creatinine 1.11 0.60 - 1.30 mg/dL    Sodium 136 132 - 146 mmol/L    Potassium 4.0 3.5 - 5.5 mmol/L    Chloride 103 99 - 109 mmol/L    CO2 27.0 20.0 - 31.0 mmol/L    Calcium 8.6 (L) 8.7 - 10.4 mg/dL    eGFR Non African Amer 63 >60 mL/min/1.73    BUN/Creatinine Ratio 17.1 7.0 - 25.0    Anion Gap 6.0 3.0 - 11.0 mmol/L   Magnesium    Collection Time: 09/07/18  7:27 PM   Result Value Ref Range    Magnesium 1.9 1.3 - 2.7 mg/dL     Note: In addition to lab results from this visit, the labs listed above may include labs taken at another facility or during a  "different encounter within the last 24 hours. Please correlate lab times with ED admission and discharge times for further clarification of the services performed during this visit.    No orders to display     Vitals:    09/07/18 1815 09/07/18 1851 09/07/18 1852 09/07/18 2036   BP: 134/63 147/89 148/94 115/69   BP Location:  Right arm Left arm    Patient Position:  Lying Lying    Pulse:  68 64 73   Resp:  18     Temp:  97.6 °F (36.4 °C)     TempSrc:  Oral     SpO2:       Weight:  80.2 kg (176 lb 12.8 oz)     Height:  170.2 cm (67\")       Medications   sodium chloride 0.9 % flush 10 mL (not administered)   apixaban (ELIQUIS) tablet 5 mg (5 mg Oral Given 9/7/18 2036)   pantoprazole (PROTONIX) EC tablet 40 mg (not administered)   aspirin EC tablet 81 mg (not administered)   vitamin A capsule 10,000 Units (not administered)   cholecalciferol (VITAMIN D3) tablet 1,000 Units (not administered)   cetirizine (zyrTEC) tablet 10 mg (not administered)   rosuvastatin (CRESTOR) tablet 20 mg (not administered)   tamsulosin (FLOMAX) 24 hr capsule 0.4 mg (0.4 mg Oral Given 9/7/18 2036)   pramipexole (MIRAPEX) tablet 0.25 mg (0.25 mg Oral Given 9/7/18 2036)   sodium chloride 0.9 % flush 1-10 mL (not administered)   Pharmacy dosing - Tikosyn (not administered)   potassium chloride (MICRO-K) CR capsule 40 mEq (not administered)     Or   potassium chloride (KLOR-CON) packet 40 mEq (not administered)   Magnesium Sulfate 2 gram Bolus, followed by 8 gram infusion (total Mg dose 10 grams)- Mg less than or equal to 1mg/dL (not administered)     Or   Magnesium Sulfate 2 gram / 50mL Infusion (GIVE X 3 BAGS TO EQUAL 6GM TOTAL DOSE) - Mg 1.1 - 1.5 mg/dl (not administered)     Or   Magnesium Sulfate 4 gram infusion- Mg 1.6-1.9 mg/dL (not administered)   Pharmacy Meds to Bed Consult (not administered)   dofetilide (TIKOSYN) capsule 250 mcg (250 mcg Oral Given 9/7/18 2036)     ECG/EMG Results (last 24 hours)     Procedure Component Value Units " Date/Time    ECG 12 Lead [[id number]] Collected:  09/07/18 1212     Updated:  09/07/18 1323    Narrative:       Test Reason : Syncope triage protocol  Blood Pressure : **/** mmHG  Vent. Rate : 061 BPM     Atrial Rate : 061 BPM     P-R Int : 208 ms          QRS Dur : 124 ms      QT Int : 416 ms       P-R-T Axes : 103 -58 -17 degrees     QTc Int : 418 ms    Normal sinus rhythm  Left anterior fascicular block  Abnormal ECG  When compared with ECG of 26-AUG-2018 15:16,  PA interval has decreased  Confirmed by ANGELICA EDGE MD ([id number]) on 9/7/2018 1:22:52 PM    Referred By:  ED MD           Confirmed By:ANGELICA EDGE MD                          [facility]    Final diagnoses:   Syncope, unspecified syncope type   Paroxysmal atrial fibrillation (CMS/HCC)       Documentation assistance provided by ximena Tan.  Information recorded by the scribe was done at my direction and has been verified and validated by me.     Albert Tan  09/07/18 2444       Angelica Edge MD  09/07/18 9784

## 2018-09-07 NOTE — PLAN OF CARE
Problem: Patient Care Overview  Goal: Plan of Care Review  Outcome: Ongoing (interventions implemented as appropriate)   09/07/18 1857   Coping/Psychosocial   Plan of Care Reviewed With patient   Plan of Care Review   Progress no change   OTHER   Outcome Summary Patient arrived to floor from ED around 1850. No complaints. Will continue to monitor.

## 2018-09-07 NOTE — TELEPHONE ENCOUNTER
I received two phone calls today from Dr. Benavidez requesting that the PT be set up for a Pacemaker- I explained that I will have to talk to Dr. Huynh about ordering the pacemaker- we could have the patient to stop his lopressor in the meantime- he wanted the patient to see Dr. Juarez today- I offered for him to see a PA today but he declined the appt and stated he would find someone else to see the pt and he hung up-    He also contacted LEONIE Antonio nurse who offered an appt in the office today with a PA or the patient can go to the ER- Dr. Benavidez told Marisela that the patient passed out in the office but he did not tell me that information- he did state that the patient was weak and he was afraid he was going to pass out-

## 2018-09-07 NOTE — H&P
[facility]  Consultation H&P    Patient: Sly Miranda  1935    There is no work phone number on file.      PCP:  Ahmet Benavidez MD   Treatment Team:   Attending Provider: Reilly Edge MD  Admitting Provider: Adis Irvin III, MD   9/7/2018      DATE OF CONSULTATION: 9/7/2018 2:56 PM     Chief complaint: Syncope    Primary Cardiologist: AMY Huynh    ASSESSMENT/PLAN:  Syncope concerning for arrhythmic etiology  Paroxysmal atrial fibrillation and atrial flutter  Tachybradycardia syndrome    -hold B-blockade and flecainide  -Tykosyn load  -Telemetry  -consider PPM on Monday    History of Present Illness   83-year-old gentleman with a history of paroxysmal atrial fibrillation and atrial flutter presents with an episode of syncope at his primary care physician's office.  He was recently evaluated in the [facility] emergency department on August 26 after presenting with a rapid heart rate to [facility] in [place].  Twelve-lead rhythm strip revealed atrial flutter with RVR.  He had received IV adenosine at [facility] and 300 mg load of flecainide.  During his evaluation in the [facility] ER he would intermittently flip into a flutter with variable conduction with heart rates in the 120-1 40 bpm range whichshe would tolerate very well, nearly asymptomatic.  We started him on flecainide 50 mg by mouth twice a day and Lopressor 25 mg by mouth twice a day.  His wife is been checking his blood pressure and heart rate 2-3 times a day since that time.  Resting heart rates when in sinus rhythm would often trend down to the mid-40s, with intermittent associated lightheadedness.  His Lopressor was subsequently backed down to 12.5 mg by mouth twice a day.  He has had intermittent episodes of heart rates in the 1:15 to 1:30 beat per minute range which are relatively asymptomatic.  He was at a routine evaluation at his primary care physician's office  today and was actually lying down hooked up to an EKG machine when he began to feel hot flushing sensation in his face and then lost consciousness.  Witnesses noticed some shaking of his upper extremities.  Rhythm strip were not obtained during this episode ude to its transient nature.  Regained consciousness spontaneously, follow-up EKGs revealed sinus rhythm with premature atrial contractions.  Otherwise he is out that his baseline state of health without chest pain, palpitations or presyncope or syncope or fatigue.    PROBLEM LIST:  1. paroxysmal  atrial fibrillation:  a. MAXIM cardioversion, 10/22/2013,  Dr. Huynh:  LVEF 55% with trace TR, mild MR.  Successful external cardioversion to sinus rhythm.  Eliquis initiated with propafenone 150 mg q.12 h.   b. EKG, 02/24/2013, revealing normal sinus rhythm at a rate of 64 beats per minute with a normal  QTC.   c. CHADS score of 1.  d. Transesophageal echocardiogram in preparation for cardioversion, 09/09/2013, Dr. Huynh:  LVEF in the lower limits of normal with a large amount of spontaneous contrast and the appearance of loosely formed thrombus  in the left atrial appendage.  Eliquis initiated.  e. Propafenone discontinued November 2013 secondary to ineffectiveness.   f. Echo March 2018-EF 50-55%.  g. ER admit 8/26/18 Aflutter, CV to Sinus bradycardia with IV cardizem  2. Bradycardia: Early Tachybrady syndrome  a. Recorded in 2008 with a history of intolerance  to AV mele blocking agents.  b. Asymptomatic bradycardia HR 50's  3. Dyslipidemia, on statin therapy.  4. Abdominal pain prompting admission to [facility], 09/06/2013:  a.  CT scan of the abdomen and pelvis showing no acute abnormality, 09/06/2013.  5. Mesenteric calcified mass:  a. Recent CT scan, June 2010,  by Dr. Aguilera, unchanged from prior.  6. Solitary pulmonary nodule:  a. X ray chest pa and lateral, 9/6/2016:  Chronic change; no active disease.  7. Gastroesophageal reflux   disease.  8. Remote negative [facility] 2008, normal coronaries   9. Surgical history:  a. Hernia surgery, 10/20/1997.  b.  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 Diaz.    OBJECTIVE:  Vitals:    09/07/18 1300 09/07/18 1400 09/07/18 1408 09/07/18 1445   BP: 128/70 108/72  110/73   Pulse: 76  63    Resp:       Temp:       TempSrc:       SpO2: 93% 95% 95%    Weight:       Height:         No intake/output data recorded.  No intake/output data recorded.  Intake & Output (last 3 days)     None           PHYSICAL EXAMINATION:    General Appearance:    Alert, cooperative, no distress, appears stated age   Head:    Normocephalic, without obvious abnormality, atraumatic   Eyes:    PERRL, conjunctiva/corneas clear, EOM's intact, fundi     benign, both eyes   Ears:    Normal TM's and external ear canals, both ears   Nose:   Nares normal, septum midline, mucosa normal, no drainage    or sinus tenderness   Throat:   Lips, mucosa, and tongue normal; teeth and gums normal   Neck:   Supple, symmetrical, trachea midline, no adenopathy;     thyroid:  no enlargement/tenderness/nodules; no carotid    bruit or JVD   Back:     Symmetric, no curvature, ROM normal, no CVA tenderness   Lungs:     Clear to auscultation bilaterally, respirations unlabored   Chest Wall:    No tenderness or deformity    Heart:    Regular rate and rhythm, S1 and S2 normal, no murmur, rub   or gallop, normal carotid impulse bilaterally without bruit.   Abdomen:     Soft, non-tender, bowel sounds active all four quadrants,     no masses, no organomegaly   Extremities:   Extremities normal, atraumatic, no cyanosis or edema   Pulses:   2+ and symmetric all extremities   Skin:   Skin color, texture, turgor normal, no rashes or lesions   Lymph nodes:   Cervical, supraclavicular, and axillary nodes normal   Neurologic:   CNII-XII intact, normal strength, sensation and reflexes     throughout     PROBLEM LIST:  Active Problems:    Syncope      Past Medical  History:   Diagnosis Date   • Bradycardia     Recorded in 2008 with a history of intolerance to AV mele blocking agents.   • Calcified mesenteric mass     Recent CT scan, June 2010, by Dr. Aguilera, unchanged from prior.   • Chronic atrial fibrillation (CMS/HCC)     on Eliquis 5 mg b.i.d.    • Dyslipidemia     , followed by Dr. Benavidez. on statin therapy.   • GERD (gastroesophageal reflux disease)      Past Surgical History:   Procedure Laterality Date   • HERNIA REPAIR  10/20/1997   • KNEE SURGERY Left    • TOTAL KNEE ARTHROPLASTY Right 03/06/2014    Right partial total knee replacement       Allergies  No Known Allergies    Current Medications    Current Facility-Administered Medications:   •  sodium chloride 0.9 % flush 10 mL, 10 mL, Intravenous, PRN, Reilly Edge MD    Current Outpatient Prescriptions:   •  aspirin 81 MG EC tablet, Take 81 mg by mouth daily., Disp: , Rfl:   •  B Complex Vitamins (VITAMIN B COMPLEX) capsule capsule, Take  by mouth Daily., Disp: , Rfl:   •  CALCIUM-MAGNESIUM-VITAMIN D PO, Take  by mouth 2 (Two) Times a Day., Disp: , Rfl:   •  cholecalciferol (VITAMIN D3) 1000 UNITS tablet, Take 1,000 Units by mouth Daily., Disp: , Rfl:   •  Coenzyme Q10 (COQ10) 400 MG capsule, Take  by mouth Daily., Disp: , Rfl:   •  colestipol (COLESTID) 1 G tablet, 2 g Daily., Disp: , Rfl:   •  ELIQUIS 5 MG tablet tablet, Every 12 (Twelve) Hours., Disp: , Rfl:   •  Fexofenadine HCl (ALLEGRA PO), Take 180 mg by mouth Daily., Disp: , Rfl:   •  metoprolol tartrate (LOPRESSOR) 25 MG tablet, Take 1 tablet by mouth 2 (Two) Times a Day., Disp: 60 tablet, Rfl: 11  •  omeprazole (PriLOSEC) 40 MG capsule, Daily., Disp: , Rfl:   •  pramipexole (MIRAPEX) 0.25 MG tablet, Take 0.25 mg by mouth Every Night., Disp: , Rfl:   •  rosuvastatin (CRESTOR) 20 MG tablet, Take 1 tablet by mouth Daily., Disp: 90 tablet, Rfl: 1  •  silodosin (RAPAFLO) 4 MG capsule capsule, Take 8 mg by mouth Daily With Breakfast., Disp: , Rfl:   •   vitamin A 8000 UNIT capsule, Take 8,000 Units by mouth Daily., Disp: , Rfl:   •  Zinc 50 MG capsule, Take  by mouth Daily., Disp: , Rfl:            ROS  Pertinent items are noted in HPI, all other systems reviewed and negative      SOCIAL HX  Social History     Social History   • Marital status:      Spouse name: N/A   • Number of children: N/A   • Years of education: N/A     Occupational History   • Not on file.     Social History Main Topics   • Smoking status: Former Smoker     Types: Cigars   • Smokeless tobacco: Never Used      Comment: quit over 40 years ago   • Alcohol use No   • Drug use: No   • Sexual activity: Not on file     Other Topics Concern   • Not on file     Social History Narrative   • No narrative on file       FAMILY HX  History reviewed. No pertinent family history.      Diagnostic Data:  Lab Results (last 24 hours)     Procedure Component Value Units Date/Time    [facility] Draw [[id number]] Collected:  09/07/18 1302    Specimen:  Blood Updated:  09/07/18 1430    Narrative:       The following orders were created for panel order [facility] Draw.  Procedure                               Abnormality         Status                     ---------                               -----------         ------                     Light Blue Top[[id number]]                                   Final result               Green Top (Gel)[[id number]]                                  Final result               Lavender Top[[id number]]                                     Final result               Gold Top - SST[[id number]]                                   Final result               Green Top (No Gel)[[id number]]                                                            Please view results for these tests on the individual orders.    Light Blue Top [[id number]] Collected:  09/07/18 1302    Specimen:  Blood Updated:  09/07/18 1415     Extra Tube hold for add-on     Comment: Auto resulted       Green Top (Gel)  [[id number]] Collected:  09/07/18 1302    Specimen:  Blood Updated:  09/07/18 1415     Extra Tube Hold for add-ons.     Comment: Auto resulted.       Lavender Top [[id number]] Collected:  09/07/18 1302    Specimen:  Blood Updated:  09/07/18 1415     Extra Tube hold for add-on     Comment: Auto resulted       Gold Top - SST [[id number]] Collected:  09/07/18 1302    Specimen:  Blood Updated:  09/07/18 1415     Extra Tube Hold for add-ons.     Comment: Auto resulted.       Comprehensive Metabolic Panel [[id number]]  (Abnormal) Collected:  09/07/18 1302    Specimen:  Blood Updated:  09/07/18 1340     Glucose 110 (H) mg/dL      BUN 19 mg/dL      Creatinine 1.11 mg/dL      Sodium 136 mmol/L      Potassium 4.2 mmol/L      Chloride 104 mmol/L      CO2 25.0 mmol/L      Calcium 8.9 mg/dL      Total Protein 6.8 g/dL      Albumin 4.35 g/dL      ALT (SGPT) 44 (H) U/L      AST (SGOT) 34 (H) U/L      Alkaline Phosphatase 64 U/L      Total Bilirubin 0.4 mg/dL      eGFR Non African Amer 63 mL/min/1.73      Globulin 2.5 gm/dL      A/G Ratio 1.8 g/dL      BUN/Creatinine Ratio 17.1     Anion Gap 7.0 mmol/L     Narrative:       [employer] Guidelines    Stage     Description        GFR  1         Normal or High     90+  2         Mild decrease      60-89  3         Moderate decrease  30-59  4         Severe decrease    15-29  5         Kidney failure     <15    The MDRD GFR formula is only valid for adults with stable renal function between ages 18 and 70.    Magnesium [[id number]]  (Normal) Collected:  09/07/18 1302    Specimen:  Blood Updated:  09/07/18 1340     Magnesium 1.9 mg/dL     POC Troponin, Rapid [[id number]]  (Normal) Collected:  09/07/18 1314    Specimen:  Blood Updated:  09/07/18 1330     Troponin I 0.00 ng/mL      Comment: Serial Number: [id number]:  [id number]       CBC & Differential [[id number]] Collected:  09/07/18 1302    Specimen:  Blood Updated:  09/07/18 1319    Narrative:       The following orders were  created for panel order CBC & Differential.  Procedure                               Abnormality         Status                     ---------                               -----------         ------                     CBC Auto Differential[[id number]]        Abnormal            Final result                 Please view results for these tests on the individual orders.    CBC Auto Differential [[id number]]  (Abnormal) Collected:  09/07/18 1302    Specimen:  Blood Updated:  09/07/18 1319     WBC 9.66 10*3/mm3      RBC 5.54 10*6/mm3      Hemoglobin 15.9 g/dL      Hematocrit 46.8 %      MCV 84.5 fL      MCH 28.7 pg      MCHC 34.0 g/dL      RDW 13.3 %      RDW-SD 40.7 fl      MPV 10.1 fL      Platelets 209 10*3/mm3      Neutrophil % 72.9 (H) %      Lymphocyte % 16.5 (L) %      Monocyte % 8.9 %      Eosinophil % 1.3 %      Basophil % 0.4 %      Immature Grans % 0.2 %      Neutrophils, Absolute 7.04 10*3/mm3      Lymphocytes, Absolute 1.59 10*3/mm3      Monocytes, Absolute 0.86 10*3/mm3      Eosinophils, Absolute 0.13 10*3/mm3      Basophils, Absolute 0.04 10*3/mm3      Immature Grans, Absolute 0.02 10*3/mm3         ECG/EMG Results (last 24 hours)     Procedure Component Value Units Date/Time    ECG 12 Lead [[id number]] Collected:  09/07/18 1212     Updated:  09/07/18 1323    Narrative:       Test Reason : Syncope triage protocol  Blood Pressure : **/** mmHG  Vent. Rate : 061 BPM     Atrial Rate : 061 BPM     P-R Int : 208 ms          QRS Dur : 124 ms      QT Int : 416 ms       P-R-T Axes : 103 -58 -17 degrees     QTc Int : 418 ms    Normal sinus rhythm  Left anterior fascicular block  Abnormal ECG  When compared with ECG of 26-AUG-2018 15:16,  CA interval has decreased  Confirmed by ANGELICA TREVIZO MD ([id number]) on 9/7/2018 1:22:52 PM    Referred By:  NETTA KIRK           Confirmed By:ANGELICA TREVIZO MD             Active Problems:    Syncope          Adis Irvin III, MD   2:56 PM 9/7/2018

## 2018-09-08 PROBLEM — I48.92 ATRIAL FLUTTER, PAROXYSMAL (HCC): Status: ACTIVE | Noted: 2018-09-08

## 2018-09-08 PROBLEM — I49.5 TACHY-BRADY SYNDROME: Status: ACTIVE | Noted: 2018-09-08

## 2018-09-08 PROBLEM — I48.0 PAROXYSMAL ATRIAL FIBRILLATION (HCC): Status: ACTIVE | Noted: 2018-09-08

## 2018-09-08 LAB
ANION GAP SERPL CALCULATED.3IONS-SCNC: 11 MMOL/L (ref 3–11)
BUN BLD-MCNC: 20 MG/DL (ref 9–23)
BUN/CREAT SERPL: 18 (ref 7–25)
CALCIUM SPEC-SCNC: 8.5 MG/DL (ref 8.7–10.4)
CHLORIDE SERPL-SCNC: 103 MMOL/L (ref 99–109)
CO2 SERPL-SCNC: 24 MMOL/L (ref 20–31)
CREAT BLD-MCNC: 1.11 MG/DL (ref 0.6–1.3)
GFR SERPL CREATININE-BSD FRML MDRD: 63 ML/MIN/1.73
GLUCOSE BLD-MCNC: 112 MG/DL (ref 70–100)
MAGNESIUM SERPL-MCNC: 2.7 MG/DL (ref 1.3–2.7)
POTASSIUM BLD-SCNC: 4.4 MMOL/L (ref 3.5–5.5)
SODIUM BLD-SCNC: 138 MMOL/L (ref 132–146)

## 2018-09-08 PROCEDURE — 93005 ELECTROCARDIOGRAM TRACING: CPT | Performed by: INTERNAL MEDICINE

## 2018-09-08 PROCEDURE — 80048 BASIC METABOLIC PNL TOTAL CA: CPT | Performed by: INTERNAL MEDICINE

## 2018-09-08 PROCEDURE — 83735 ASSAY OF MAGNESIUM: CPT | Performed by: INTERNAL MEDICINE

## 2018-09-08 PROCEDURE — 99232 SBSQ HOSP IP/OBS MODERATE 35: CPT | Performed by: PHYSICIAN ASSISTANT

## 2018-09-08 PROCEDURE — 93010 ELECTROCARDIOGRAM REPORT: CPT | Performed by: INTERNAL MEDICINE

## 2018-09-08 RX ORDER — METOPROLOL TARTRATE 5 MG/5ML
5 INJECTION INTRAVENOUS EVERY 6 HOURS PRN
Status: DISCONTINUED | OUTPATIENT
Start: 2018-09-08 | End: 2018-09-11 | Stop reason: HOSPADM

## 2018-09-08 RX ADMIN — TAMSULOSIN HYDROCHLORIDE 0.4 MG: 0.4 CAPSULE ORAL at 20:10

## 2018-09-08 RX ADMIN — APIXABAN 5 MG: 5 TABLET, FILM COATED ORAL at 09:07

## 2018-09-08 RX ADMIN — VITAMIN D, TAB 1000IU (100/BT) 1000 UNITS: 25 TAB at 09:07

## 2018-09-08 RX ADMIN — MAGNESIUM SULFATE IN WATER 4 G: 40 INJECTION, SOLUTION INTRAVENOUS at 00:42

## 2018-09-08 RX ADMIN — Medication 10000 UNITS: at 09:00

## 2018-09-08 RX ADMIN — METOPROLOL TARTRATE 5 MG: 1 INJECTION, SOLUTION INTRAVENOUS at 09:25

## 2018-09-08 RX ADMIN — PRAMIPEXOLE DIHYDROCHLORIDE 0.25 MG: 0.25 TABLET ORAL at 20:10

## 2018-09-08 RX ADMIN — DOFETILIDE 250 MCG: 0.25 CAPSULE ORAL at 09:07

## 2018-09-08 RX ADMIN — ASPIRIN 81 MG: 81 TABLET, COATED ORAL at 09:07

## 2018-09-08 RX ADMIN — DOFETILIDE 250 MCG: 0.25 CAPSULE ORAL at 20:10

## 2018-09-08 RX ADMIN — APIXABAN 5 MG: 5 TABLET, FILM COATED ORAL at 20:10

## 2018-09-08 RX ADMIN — ROSUVASTATIN CALCIUM 20 MG: 20 TABLET, FILM COATED ORAL at 09:07

## 2018-09-08 RX ADMIN — PANTOPRAZOLE SODIUM 40 MG: 40 TABLET, DELAYED RELEASE ORAL at 06:06

## 2018-09-08 RX ADMIN — CETIRIZINE HYDROCHLORIDE 10 MG: 10 TABLET, FILM COATED ORAL at 09:07

## 2018-09-08 NOTE — PROGRESS NOTES
CARDIOLOGY PROGRESS NOTE           9/8/2018 8:20 AM    Admit Date: 9/7/2018    Admit Diagnosis: Syncope, unspecified syncope type [R55]    Chief Compliant: Follow up for syncope     Subjective:   Patient's status has been unstable overnight. He does report occasional palpitations and is having tachy/chloé events.        Objective:     Vitals:    09/07/18 2036 09/08/18 0020 09/08/18 0251 09/08/18 0740   BP: 115/69 110/69 132/78 111/83   BP Location:  Left arm Left arm Left arm   Patient Position:  Lying Lying Lying   Pulse: 73 92  68   Resp:  16 16 14   Temp:  97.9 °F (36.6 °C) 97.8 °F (36.6 °C) 97.4 °F (36.3 °C)   TempSrc:  Oral Oral Oral   SpO2:  92% 95% 94%   Weight:       Height:           Physical Exam:  General-Well Nourished, Well developed  Eyes - PERRLA  Neck- supple, No mass  CV- tachy rate and rhythm, no MRG  Lung- clear bilaterally  Abd- soft, +BS  Musc/skel - Norm strength and range of motion  Skin- warm and dry  Neuro - Alert & Oriented x 3, appropriate mood.      Current Facility-Administered Medications:   •  apixaban (ELIQUIS) tablet 5 mg, 5 mg, Oral, Q12H, Adis Irvin III, MD, 5 mg at 09/07/18 2036  •  aspirin EC tablet 81 mg, 81 mg, Oral, Daily, Adis Irvin III, MD  •  cetirizine (zyrTEC) tablet 10 mg, 10 mg, Oral, Daily, Adis Irvin III, MD  •  cholecalciferol (VITAMIN D3) tablet 1,000 Units, 1,000 Units, Oral, Daily, Adis Irvin III, MD  •  dofetilide (TIKOSYN) capsule 250 mcg, 250 mcg, Oral, Q12H, Adis Irvin III, MD, 250 mcg at 09/07/18 2036  •  Magnesium Sulfate 2 gram Bolus, followed by 8 gram infusion (total Mg dose 10 grams)- Mg less than or equal to 1mg/dL, 2 g, Intravenous, PRN **OR** Magnesium Sulfate 2 gram / 50mL Infusion (GIVE X 3 BAGS TO EQUAL 6GM TOTAL DOSE) - Mg 1.1 - 1.5 mg/dl, 2 g, Intravenous, PRN **OR** Magnesium Sulfate 4 gram infusion- Mg 1.6-1.9 mg/dL, 4 g, Intravenous, PRN, Adis Irvin III, MD, Last Rate: 25 mL/hr at 09/08/18 0042, 4 g at 09/08/18 0042  •   pantoprazole (PROTONIX) EC tablet 40 mg, 40 mg, Oral, QAM, Adis Irvin III, MD, 40 mg at 09/08/18 0606  •  Pharmacy dosing - Tikosyn, , Does not apply, Once PRN, Adis Irvin III, MD  •  Pharmacy Meds to Bed Consult, , Does not apply, Daily, Adis Irvin III, MD  •  potassium chloride (MICRO-K) CR capsule 40 mEq, 40 mEq, Oral, PRN **OR** potassium chloride (KLOR-CON) packet 40 mEq, 40 mEq, Oral, PRN, Adis Irvin III, MD  •  pramipexole (MIRAPEX) tablet 0.25 mg, 0.25 mg, Oral, Nightly, Adis Irvin III, MD, 0.25 mg at 09/07/18 2036  •  rosuvastatin (CRESTOR) tablet 20 mg, 20 mg, Oral, Daily, Adis Irvin III, MD  •  sodium chloride 0.9 % flush 1-10 mL, 1-10 mL, Intravenous, PRN, Adis Irvin III, MD  •  sodium chloride 0.9 % flush 10 mL, 10 mL, Intravenous, PRN, Reilly Edge MD  •  tamsulosin (FLOMAX) 24 hr capsule 0.4 mg, 0.4 mg, Oral, Nightly, Adis Irvin III, MD, 0.4 mg at 09/07/18 2036  •  vitamin A capsule 10,000 Units, 10,000 Units, Oral, Daily, Adis Irvin III, MD    Data Review:   Recent Results (from the past 24 hour(s))   Comprehensive Metabolic Panel    Collection Time: 09/07/18  1:02 PM   Result Value Ref Range    Glucose 110 (H) 70 - 100 mg/dL    BUN 19 9 - 23 mg/dL    Creatinine 1.11 0.60 - 1.30 mg/dL    Sodium 136 132 - 146 mmol/L    Potassium 4.2 3.5 - 5.5 mmol/L    Chloride 104 99 - 109 mmol/L    CO2 25.0 20.0 - 31.0 mmol/L    Calcium 8.9 8.7 - 10.4 mg/dL    Total Protein 6.8 5.7 - 8.2 g/dL    Albumin 4.35 3.20 - 4.80 g/dL    ALT (SGPT) 44 (H) 7 - 40 U/L    AST (SGOT) 34 (H) 0 - 33 U/L    Alkaline Phosphatase 64 25 - 100 U/L    Total Bilirubin 0.4 0.3 - 1.2 mg/dL    eGFR Non African Amer 63 >60 mL/min/1.73    Globulin 2.5 gm/dL    A/G Ratio 1.8 1.5 - 2.5 g/dL    BUN/Creatinine Ratio 17.1 7.0 - 25.0    Anion Gap 7.0 3.0 - 11.0 mmol/L   Magnesium    Collection Time: 09/07/18  1:02 PM   Result Value Ref Range    Magnesium 1.9 1.3 - 2.7 mg/dL   Light Blue Top    Collection Time: 09/07/18   1:02 PM   Result Value Ref Range    Extra Tube hold for add-on    Green Top (Gel)    Collection Time: 09/07/18  1:02 PM   Result Value Ref Range    Extra Tube Hold for add-ons.    Lavender Top    Collection Time: 09/07/18  1:02 PM   Result Value Ref Range    Extra Tube hold for add-on    Gold Top - SST    Collection Time: 09/07/18  1:02 PM   Result Value Ref Range    Extra Tube Hold for add-ons.    CBC Auto Differential    Collection Time: 09/07/18  1:02 PM   Result Value Ref Range    WBC 9.66 3.50 - 10.80 10*3/mm3    RBC 5.54 4.20 - 5.76 10*6/mm3    Hemoglobin 15.9 13.1 - 17.5 g/dL    Hematocrit 46.8 38.9 - 50.9 %    MCV 84.5 80.0 - 99.0 fL    MCH 28.7 27.0 - 31.0 pg    MCHC 34.0 32.0 - 36.0 g/dL    RDW 13.3 11.3 - 14.5 %    RDW-SD 40.7 37.0 - 54.0 fl    MPV 10.1 6.0 - 12.0 fL    Platelets 209 150 - 450 10*3/mm3    Neutrophil % 72.9 (H) 41.0 - 71.0 %    Lymphocyte % 16.5 (L) 24.0 - 44.0 %    Monocyte % 8.9 0.0 - 12.0 %    Eosinophil % 1.3 0.0 - 3.0 %    Basophil % 0.4 0.0 - 1.0 %    Immature Grans % 0.2 0.0 - 0.6 %    Neutrophils, Absolute 7.04 1.50 - 8.30 10*3/mm3    Lymphocytes, Absolute 1.59 0.60 - 4.80 10*3/mm3    Monocytes, Absolute 0.86 0.00 - 1.00 10*3/mm3    Eosinophils, Absolute 0.13 0.00 - 0.30 10*3/mm3    Basophils, Absolute 0.04 0.00 - 0.20 10*3/mm3    Immature Grans, Absolute 0.02 0.00 - 0.03 10*3/mm3   POC Troponin, Rapid    Collection Time: 09/07/18  1:14 PM   Result Value Ref Range    Troponin I 0.00 0.00 - 0.07 ng/mL   Troponin    Collection Time: 09/07/18  3:33 PM   Result Value Ref Range    Troponin I 0.010 <=0.039 ng/mL   Basic Metabolic Panel    Collection Time: 09/07/18  7:27 PM   Result Value Ref Range    Glucose 106 (H) 70 - 100 mg/dL    BUN 19 9 - 23 mg/dL    Creatinine 1.11 0.60 - 1.30 mg/dL    Sodium 136 132 - 146 mmol/L    Potassium 4.0 3.5 - 5.5 mmol/L    Chloride 103 99 - 109 mmol/L    CO2 27.0 20.0 - 31.0 mmol/L    Calcium 8.6 (L) 8.7 - 10.4 mg/dL    eGFR Non African Amer 63 >60  mL/min/1.73    BUN/Creatinine Ratio 17.1 7.0 - 25.0    Anion Gap 6.0 3.0 - 11.0 mmol/L   Magnesium    Collection Time: 09/07/18  7:27 PM   Result Value Ref Range    Magnesium 1.9 1.3 - 2.7 mg/dL   Basic Metabolic Panel    Collection Time: 09/08/18  4:38 AM   Result Value Ref Range    Glucose 112 (H) 70 - 100 mg/dL    BUN 20 9 - 23 mg/dL    Creatinine 1.11 0.60 - 1.30 mg/dL    Sodium 138 132 - 146 mmol/L    Potassium 4.4 3.5 - 5.5 mmol/L    Chloride 103 99 - 109 mmol/L    CO2 24.0 20.0 - 31.0 mmol/L    Calcium 8.5 (L) 8.7 - 10.4 mg/dL    eGFR Non African Amer 63 >60 mL/min/1.73    BUN/Creatinine Ratio 18.0 7.0 - 25.0    Anion Gap 11.0 3.0 - 11.0 mmol/L   Magnesium    Collection Time: 09/08/18  4:38 AM   Result Value Ref Range    Magnesium 2.7 1.3 - 2.7 mg/dL     Assessment:     Active Problems:    Bradycardia    Syncope    Paroxysmal atrial fibrillation (CMS/HCC)    Atrial flutter, paroxysmal (CMS/HCC)    Tachy-chloé syndrome (CMS/HCC)    Plan:     1. Paroxysmal Afib/Atrial Flutter w/ RVR- now in SR w/ brief runs of afib.   - discontinuation of Flecainide and started on Tikosyn 250mcg BID   - will receive 2nd dose of Tikosyn this morning. QTc is stable.   - Metoprolol on hold due to tachybrady syndrome. Plan for pacemaker placement on Monday   - Continue Eliquis for anticoagulation.     2. Tachybrady syndrome/Syncope    - plan for possible dual chamber pacemaker implant on Monday     Petrona Doshi PA-C  Cardiology/Electrophysiology

## 2018-09-08 NOTE — PLAN OF CARE
Problem: Patient Care Overview  Goal: Plan of Care Review  Pt had an episode of a flutter this am, Dr Pineda here ordered metoprolol 5mg IV q6h prn, one dose given & stopped within an hour. No more episodes other than tachycardic when up, but comes back down on own.  Tikosyn 250 given & continued possible pacemaker on Monday..

## 2018-09-08 NOTE — PROGRESS NOTES
"Tikosyn Initiation - Pharmacy Evaluation    Name- Sly Miranda  Age- 83 y.o.  Sex- male  HT - 170.2 cm (67\")  Wt - 80.2 kg (176 lb 12.8 oz)    Evaluation of Drug-Drug Interactions  •  No major drug-drug interactions exist    Previous antiarrythmic medications D/C 'ed prior to admission      Amiodarone D/C 'ed >3 weeks   Sotalol D/C 'ed > 48hr   Class I antiarrythmic's (Disopyramide,Flecainide, Mexiletine, Propafenone) D/C 'ed >48hr      Proceed - Yes      Drug-Drug Interactions with admission regimen    The Following medications are contraindicated with Dofetilide and will be discontinued:  Cimetidine, Ciprofloxacin, Dolutegravir, Fingolimod, Hydrochlorothiazide and combination products containing Hydrochlorothiazide, Itraconazole, Ketoconazole, Levofloxacin, Megestrol, Moxifloxacin, Norfloxacin, Pimozide, Posconazole, Prochloperazine, Saquinavir, Thioridazine, Trimethoprim, Trimethoprim-Sulfamethoxazole, Verapamil, Ziprasidone    The following medications are recognized to prolong QT interval, however the medication continue during initial Dofetilide dosing:  -Asenapine, Diltiazem, EriBULin, Haloperidol, Ivabradine, Procainamide, Quetiapine  -Phenothiazines (chlorpromazine, fluphenazine, mesoridazine, perphenazine, promazine, trifluoperazine)  -Ondansetron, Paliperidone  -Loop Diuretics (bumetanide, furosemide, torsemide)  -Antidepressants (Amitriptyline, Amoxapine, Clomipramine, Desipramine, Doxepin, Imipramine, Maprotiline, Mirtazapine, Nortriptyline, Protriptyline, Triipramine)    The following medications may increase Dofetilide serum concentrations and can be co-administered:  -Antifungals (Fluconazole, Voriconazole)  -Macrolides Antibiotics (Erythromycin, Clarithromycin)  -Metformin, Glyburide  -SSRI's (Citalopram, Escitalopram, Fluvoxamine, Fluoxetine, Paroxetine, Sertraline)  -Protease Inhibitors (Amprenavir, Indinavir, Nelfinavir, Ritonavir)  -Amiloride, Cannabinoids, Nefazodone, Triamterene, Quinine, " Lamotrigine)  -Ibutilide, Disopyramide, Diltiazem    Laboratory      Results from last 7 days     Lab Units 09/08/18  0438 09/07/18  1927 09/07/18  1302   SODIUM mmol/L 138 136 136   POTASSIUM mmol/L 4.4 4.0 4.2   CHLORIDE mmol/L 103 103 104   CO2 mmol/L 24.0 27.0 25.0   BUN mg/dL 20 19 19   CREATININE mg/dL 1.11 1.11 1.11   GLUCOSE mg/dL 112* 106* 110*   CALCIUM mg/dL 8.5* 8.6* 8.9       Results from last 7 days     Lab Units 09/08/18  0438   MAGNESIUM mg/dL 2.7     Electrolyte replacement ordered:   Mg <2.0 - No     K <4.0  - No    Est CrCl =  57.2 ml/min  (calculated with Cockroft-Gault equation using actual body weight and serum creatinine for calculation)  (laboratory values from previous 24 hours can be used)    Initial Dose  Patient has functioning atrial pacemaker -  No   Proceed - Yes  QTc = 448     QTc </= 440 msec -  No   Proceed - Yes  QTc >440 msec -  Yes    MD or physician extender contacted - No, chart note left by Dr. Pineda acknowledging QTc though.     Proceed - Yes    Initial Dose:  Yes - CrCl 40-60   Dofetilide 250mcg po q12h    (dosed at 10 hours intervals until administration times between 7-9)    Thank you,  Praful Robbins, [facility]  9/8/2018  1:34 PM

## 2018-09-08 NOTE — PROGRESS NOTES
Discharge Planning Assessment  [facility]     Patient Name: Sly Miranda  MRN: [id number]  Today's Date: 9/8/2018    Admit Date: 9/7/2018          Discharge Needs Assessment     Row Name 09/08/18 8898       Living Environment    Lives With spouse    Name(s) of Who Lives With Patient Fani- wife    Current Living Arrangements home/apartment/condo    Provides Primary Care For no one    Family Caregiver if Needed none    Quality of Family Relationships helpful;involved;supportive    Able to Return to Prior Arrangements yes       Resource/Environmental Concerns    Resource/Environmental Concerns none    Transportation Concerns car, none       Transition Planning    Patient/Family Anticipates Transition to home;home with family    Transportation Anticipated car, drives self       Discharge Needs Assessment    Readmission Within the Last 30 Days no previous admission in last 30 days    Concerns to be Addressed --   tikosyn therapy, discussed medication needs     Equipment Currently Used at Home none   He does have a: bedside commode, walker, straight cane, wheelchair.    Anticipated Changes Related to Illness none    Equipment Needed After Discharge none    Discharge Coordination/Progress initial discharge needs discussed.            Discharge Plan     Row Name 09/08/18 1456       Plan    Patient/Family in Agreement with Plan yes    Plan Comments Spoke with patient regarding tikosyn d/c needs. Patient uses [facility]. I called [phone] and spoke with González. They do not have 250 mcg in stock. They will be able to order correct dose for patient but wouldn't get to shop until next week. They were able to run the patients insurnace and it will cost the patient $90 for 30 day supply.  I discussed this with  patient and he would like to have his script called into [facility] in [place], [place]. I called [facility] at [phone] and spoke with Zaid. They do have tikosyn 250 mcg in stock.  He is also  requesting a 90 days supply script for mail order to [facility]. If there are any further needs please call Ruth at [phone]. [phone].        Destination     No service coordination in this encounter.      Durable Medical Equipment     No service coordination in this encounter.      Dialysis/Infusion     No service coordination in this encounter.      Home Medical Care     No service coordination in this encounter.      Social Care     No service coordination in this encounter.                Demographic Summary     Row Name 09/08/18 8888       General Information    Admission Type inpatient    Arrived From home    Referral Source nursing;physician    Reason for Consult discharge planning    Preferred Language English     Used During This Interaction no       Contact Information    Permission Granted to Share Info With     Contact Information Obtained for             Functional Status     Row Name 09/08/18 3412       Functional Status    Functional Status Comments see nursing assessment            Psychosocial    No documentation.           Abuse/Neglect    No documentation.           Legal    No documentation.           Substance Abuse    No documentation.           Patient Forms    No documentation.         Ruth Earl

## 2018-09-08 NOTE — PROGRESS NOTES
Continued Stay Note  [facility]     Patient Name: Sly Miranda  MRN: [id number]  Today's Date: 9/8/2018    Admit Date: 9/7/2018          Discharge Plan     Row Name 09/08/18 1332       Plan    Patient/Family in Agreement with Plan yes    Plan Comments Spoke with patient regarding tikosyn d/c needs. Patient uses [facility]. I called [phone] and spoke with González. They do not have 250 mcg in stock. They will be able to order correct dose for patient but wouldn't get to shop until next week. They were able to run the patients insurnace and it will cost the patient $90 for 30 day supply.  I discussed this with th patient and he would like to have his script called into [facility] in [place]. I called [facility] at [phone] and spoke with Zaid. They do have tikosyn 250 mcg in stock.  He is also requesting a 90 days supply script for mail order to [facility]. If there are any further needs please call Ruth at [phone]. [phone].              Discharge Codes    No documentation.           Ruth Earl

## 2018-09-09 ENCOUNTER — PREP FOR SURGERY (OUTPATIENT)
Dept: OTHER | Facility: HOSPITAL | Age: 83
End: 2018-09-09

## 2018-09-09 LAB
ANION GAP SERPL CALCULATED.3IONS-SCNC: 9 MMOL/L (ref 3–11)
BUN BLD-MCNC: 18 MG/DL (ref 9–23)
BUN/CREAT SERPL: 16.1 (ref 7–25)
CALCIUM SPEC-SCNC: 8.3 MG/DL (ref 8.7–10.4)
CHLORIDE SERPL-SCNC: 105 MMOL/L (ref 99–109)
CO2 SERPL-SCNC: 23 MMOL/L (ref 20–31)
CREAT BLD-MCNC: 1.12 MG/DL (ref 0.6–1.3)
GFR SERPL CREATININE-BSD FRML MDRD: 63 ML/MIN/1.73
GLUCOSE BLD-MCNC: 130 MG/DL (ref 70–100)
MAGNESIUM SERPL-MCNC: 2.1 MG/DL (ref 1.3–2.7)
POTASSIUM BLD-SCNC: 3.9 MMOL/L (ref 3.5–5.5)
SODIUM BLD-SCNC: 137 MMOL/L (ref 132–146)

## 2018-09-09 PROCEDURE — 93005 ELECTROCARDIOGRAM TRACING: CPT | Performed by: INTERNAL MEDICINE

## 2018-09-09 PROCEDURE — 80048 BASIC METABOLIC PNL TOTAL CA: CPT | Performed by: INTERNAL MEDICINE

## 2018-09-09 PROCEDURE — 99024 POSTOP FOLLOW-UP VISIT: CPT | Performed by: INTERNAL MEDICINE

## 2018-09-09 PROCEDURE — 83735 ASSAY OF MAGNESIUM: CPT

## 2018-09-09 PROCEDURE — 93010 ELECTROCARDIOGRAM REPORT: CPT | Performed by: INTERNAL MEDICINE

## 2018-09-09 RX ORDER — DOCUSATE SODIUM 100 MG/1
100 CAPSULE, LIQUID FILLED ORAL 2 TIMES DAILY
Status: DISCONTINUED | OUTPATIENT
Start: 2018-09-09 | End: 2018-09-11 | Stop reason: HOSPADM

## 2018-09-09 RX ORDER — CEFAZOLIN SODIUM 2 G/100ML
2 INJECTION, SOLUTION INTRAVENOUS
Status: COMPLETED | OUTPATIENT
Start: 2018-09-10 | End: 2018-09-10

## 2018-09-09 RX ADMIN — METOPROLOL TARTRATE 5 MG: 1 INJECTION, SOLUTION INTRAVENOUS at 05:30

## 2018-09-09 RX ADMIN — ROSUVASTATIN CALCIUM 20 MG: 20 TABLET, FILM COATED ORAL at 09:52

## 2018-09-09 RX ADMIN — TAMSULOSIN HYDROCHLORIDE 0.4 MG: 0.4 CAPSULE ORAL at 21:01

## 2018-09-09 RX ADMIN — PANTOPRAZOLE SODIUM 40 MG: 40 TABLET, DELAYED RELEASE ORAL at 06:34

## 2018-09-09 RX ADMIN — VITAMIN D, TAB 1000IU (100/BT) 1000 UNITS: 25 TAB at 09:53

## 2018-09-09 RX ADMIN — PRAMIPEXOLE DIHYDROCHLORIDE 0.25 MG: 0.25 TABLET ORAL at 21:02

## 2018-09-09 RX ADMIN — ASPIRIN 81 MG: 81 TABLET, COATED ORAL at 09:53

## 2018-09-09 RX ADMIN — DOFETILIDE 250 MCG: 0.25 CAPSULE ORAL at 21:01

## 2018-09-09 RX ADMIN — DOFETILIDE 250 MCG: 0.25 CAPSULE ORAL at 09:53

## 2018-09-09 RX ADMIN — CETIRIZINE HYDROCHLORIDE 10 MG: 10 TABLET, FILM COATED ORAL at 09:53

## 2018-09-09 RX ADMIN — Medication 10000 UNITS: at 09:53

## 2018-09-09 RX ADMIN — APIXABAN 5 MG: 5 TABLET, FILM COATED ORAL at 09:52

## 2018-09-09 NOTE — PROGRESS NOTES
"Tikosyn Initiation - Pharmacy Evaluation    Name- Sly Miranda  Age- 83 y.o.  Sex- male  HT - 170.2 cm (67\")  Wt - 80.2 kg (176 lb 12.8 oz)    Evaluation of Drug-Drug Interactions  •  No major drug-drug interactions exist    Previous antiarrythmic medications D/C 'ed prior to admission      Amiodarone D/C 'ed >3 weeks   Sotalol D/C 'ed > 48hr   Class I antiarrythmic's (Disopyramide,Flecainide, Mexiletine, Propafenone) D/C 'ed >48hr      Proceed - Yes      Drug-Drug Interactions with admission regimen    The Following medications are contraindicated with Dofetilide and will be discontinued:  Cimetidine, Ciprofloxacin, Dolutegravir, Fingolimod, Hydrochlorothiazide and combination products containing Hydrochlorothiazide, Itraconazole, Ketoconazole, Levofloxacin, Megestrol, Moxifloxacin, Norfloxacin, Pimozide, Posconazole, Prochloperazine, Saquinavir, Thioridazine, Trimethoprim, Trimethoprim-Sulfamethoxazole, Verapamil, Ziprasidone    The following medications are recognized to prolong QT interval, however the medication continue during initial Dofetilide dosing:  -Asenapine, Diltiazem, EriBULin, Haloperidol, Ivabradine, Procainamide, Quetiapine  -Phenothiazines (chlorpromazine, fluphenazine, mesoridazine, perphenazine, promazine, trifluoperazine)  -Ondansetron, Paliperidone  -Loop Diuretics (bumetanide, furosemide, torsemide)  -Antidepressants (Amitriptyline, Amoxapine, Clomipramine, Desipramine, Doxepin, Imipramine, Maprotiline, Mirtazapine, Nortriptyline, Protriptyline, Triipramine)    The following medications may increase Dofetilide serum concentrations and can be co-administered:  -Antifungals (Fluconazole, Voriconazole)  -Macrolides Antibiotics (Erythromycin, Clarithromycin)  -Metformin, Glyburide  -SSRI's (Citalopram, Escitalopram, Fluvoxamine, Fluoxetine, Paroxetine, Sertraline)  -Protease Inhibitors (Amprenavir, Indinavir, Nelfinavir, Ritonavir)  -Amiloride, Cannabinoids, Nefazodone, Triamterene, Quinine, " Lamotrigine)  -Ibutilide, Disopyramide, Diltiazem    Laboratory    Results from last 7 days   Lab Units 09/09/18  0643 09/08/18  0438 09/07/18  1927   SODIUM mmol/L 137 138 136   POTASSIUM mmol/L 3.9 4.4 4.0   CHLORIDE mmol/L 105 103 103   CO2 mmol/L 23.0 24.0 27.0   BUN mg/dL 18 20 19   CREATININE mg/dL 1.12 1.11 1.11   GLUCOSE mg/dL 130* 112* 106*   CALCIUM mg/dL 8.3* 8.5* 8.6*     Results from last 7 days   Lab Units 09/09/18  0643   MAGNESIUM mg/dL 2.1     Electrolyte replacement ordered:   Mg <2.0 - No      K <4.0  - Yes (3.9) (replacements on file)    Est CrCl =  56.7 ml/min  (calculated with Cockroft-Gault equation using actual body weight and serum creatinine for calculation)  (laboratory values from previous 24 hours can be used)    Initial Dose  Patient has functioning atrial pacemaker -  No   Proceed - Yes  QTc = 448 (9/8)    QTc </= 440 msec -  No   Proceed - Yes  QTc >440 msec -  Yes    MD or physician extender contacted - No, chart note left by Dr. Pineda acknowledging QTc though.     Proceed - Yes    Initial Dose:  Yes - CrCl 40-60   Dofetilide 250mcg po q12h    (dosed at 10 hours intervals until administration times between 7-9)    Thanks,  Praful Robbins, PharmD  Pharmacy Resident  9/9/2018  2:22 PM

## 2018-09-09 NOTE — PLAN OF CARE
Problem: Patient Care Overview  Goal: Plan of Care Review  Outcome: Ongoing (interventions implemented as appropriate)   09/09/18 0649   Coping/Psychosocial   Plan of Care Reviewed With patient   Plan of Care Review   Progress no change   OTHER   Outcome Summary Pt rested well this shift. VSS until 0600, HR maintained over 130s. IV metoprolol given. Pt had two near syncopal episodes. Pt put in trendelenburg. monitoring BP. NSR w/ 1st degree block.

## 2018-09-09 NOTE — PROGRESS NOTES
CARDIOLOGY PROGRESS NOTE           9/9/2018 9:58 AM    Admit Date: 9/7/2018    Admit Diagnosis: Syncope, unspecified syncope type [R55]    Chief Compliant: Follow up for afib     Subjective:   Patient's status has been stable overnight. Has had runs of afib and IV lorpessor caused hypotension.       Objective:     Vitals:    09/09/18 0601 09/09/18 0602 09/09/18 0605 09/09/18 0700   BP: (!) 65/46 (!) 81/50 92/59 104/67   BP Location: Left arm Left arm Left arm Left arm   Patient Position: Lying Lying Lying Lying   Pulse: 73 79 74 63   Resp:    18   Temp:    98.1 °F (36.7 °C)   TempSrc:    Oral   SpO2: 93% 93% 94% 95%   Weight:       Height:           Physical Exam:  General-Well Nourished, Well developed  Eyes - PERRLA  Neck- supple, No mass  CV- regular rate and rhythm, no MRG  Lung- clear bilaterally  Abd- soft, +BS  Musc/skel - Norm strength and range of motion  Skin- warm and dry  Neuro - Alert & Oriented x 3, appropriate mood.      Current Facility-Administered Medications:   •  apixaban (ELIQUIS) tablet 5 mg, 5 mg, Oral, Q12H, Adis Irvin III, MD, 5 mg at 09/09/18 0952  •  aspirin EC tablet 81 mg, 81 mg, Oral, Daily, Adis Irvin III, MD, 81 mg at 09/09/18 0953  •  cetirizine (zyrTEC) tablet 10 mg, 10 mg, Oral, Daily, Adis Irvin III, MD, 10 mg at 09/09/18 0953  •  cholecalciferol (VITAMIN D3) tablet 1,000 Units, 1,000 Units, Oral, Daily, Adis Irvin III, MD, 1,000 Units at 09/09/18 0953  •  dofetilide (TIKOSYN) capsule 250 mcg, 250 mcg, Oral, Q12H, Adis Irvin III, MD, 250 mcg at 09/09/18 0953  •  Magnesium Sulfate 2 gram Bolus, followed by 8 gram infusion (total Mg dose 10 grams)- Mg less than or equal to 1mg/dL, 2 g, Intravenous, PRN **OR** Magnesium Sulfate 2 gram / 50mL Infusion (GIVE X 3 BAGS TO EQUAL 6GM TOTAL DOSE) - Mg 1.1 - 1.5 mg/dl, 2 g, Intravenous, PRN **OR** Magnesium Sulfate 4 gram infusion- Mg 1.6-1.9 mg/dL, 4 g, Intravenous, PRN, Adis Irvin III, MD, Last Rate: 25 mL/hr at 09/08/18  0042, 4 g at 09/08/18 0042  •  metoprolol tartrate (LOPRESSOR) injection 5 mg, 5 mg, Intravenous, Q6H PRN, Donnell Pineda MD, 5 mg at 09/09/18 0530  •  pantoprazole (PROTONIX) EC tablet 40 mg, 40 mg, Oral, QAM, Adis Irvin III, MD, 40 mg at 09/09/18 0634  •  Pharmacy dosing - Tikosyn, , Does not apply, Once PRN, Adis Irvin III, MD  •  Pharmacy Meds to Bed Consult, , Does not apply, Daily, Adis Irvin III, MD, Stopped at 09/08/18 0902  •  potassium chloride (MICRO-K) CR capsule 40 mEq, 40 mEq, Oral, PRN **OR** potassium chloride (KLOR-CON) packet 40 mEq, 40 mEq, Oral, PRN, Adis Irvin III, MD  •  pramipexole (MIRAPEX) tablet 0.25 mg, 0.25 mg, Oral, Nightly, Adis Irvin III, MD, 0.25 mg at 09/08/18 2010  •  rosuvastatin (CRESTOR) tablet 20 mg, 20 mg, Oral, Daily, Adis Irvin III, MD, 20 mg at 09/08/18 0907  •  sodium chloride 0.9 % flush 1-10 mL, 1-10 mL, Intravenous, PRN, Adis Irvin III, MD  •  sodium chloride 0.9 % flush 10 mL, 10 mL, Intravenous, PRN, Reilly Edge MD  •  tamsulosin (FLOMAX) 24 hr capsule 0.4 mg, 0.4 mg, Oral, Nightly, Adis Irvin III, MD, 0.4 mg at 09/08/18 2010  •  vitamin A capsule 10,000 Units, 10,000 Units, Oral, Daily, Adis Irvin III, MD, 10,000 Units at 09/09/18 0953    Data Review:   Recent Results (from the past 24 hour(s))   Basic Metabolic Panel    Collection Time: 09/09/18  6:43 AM   Result Value Ref Range    Glucose 130 (H) 70 - 100 mg/dL    BUN 18 9 - 23 mg/dL    Creatinine 1.12 0.60 - 1.30 mg/dL    Sodium 137 132 - 146 mmol/L    Potassium 3.9 3.5 - 5.5 mmol/L    Chloride 105 99 - 109 mmol/L    CO2 23.0 20.0 - 31.0 mmol/L    Calcium 8.3 (L) 8.7 - 10.4 mg/dL    eGFR Non African Amer 63 >60 mL/min/1.73    BUN/Creatinine Ratio 16.1 7.0 - 25.0    Anion Gap 9.0 3.0 - 11.0 mmol/L     Assessment:     Active Problems:    Bradycardia    Syncope    Paroxysmal atrial fibrillation (CMS/HCC)    Atrial flutter, paroxysmal (CMS/HCC)    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 Paroxysmal Afib/Atrial Flutter w/ RVR- now in SR w/ runs of afib   - discontinuation of Flecainide and started on Tikosyn 250mcg BID   - will receive 4th dose of Tikosyn this morning. QTc is stable.   - Metoprolol on hold due to tachybrady syndrome. Plan for pacemaker placement on Monday and restart rate control meds then. If unable to tolerate meds will plan for AV node Ablation.   - Continue Eliquis for anticoagulation. Will hold tonight and in the am for pacemaker     2. Tachybrady syndrome/Syncope    - plan for dual chamber pacemaker implant on Monday as we are limited with rate control meds due to bradycardia.     Petrona Doshi PA-C   Cardiology/Electrophysiology    I have seen and examined the patient, case was discussed with the physician extender, reviewed the above note, necessary changes were made and I agree with the final note.   Donnell Pineda MD

## 2018-09-10 LAB
ANION GAP SERPL CALCULATED.3IONS-SCNC: 7 MMOL/L (ref 3–11)
BUN BLD-MCNC: 16 MG/DL (ref 9–23)
BUN/CREAT SERPL: 15.8 (ref 7–25)
CALCIUM SPEC-SCNC: 8.4 MG/DL (ref 8.7–10.4)
CHLORIDE SERPL-SCNC: 106 MMOL/L (ref 99–109)
CO2 SERPL-SCNC: 25 MMOL/L (ref 20–31)
CREAT BLD-MCNC: 1.01 MG/DL (ref 0.6–1.3)
GFR SERPL CREATININE-BSD FRML MDRD: 71 ML/MIN/1.73
GLUCOSE BLD-MCNC: 98 MG/DL (ref 70–100)
MAGNESIUM SERPL-MCNC: 2 MG/DL (ref 1.3–2.7)
POTASSIUM BLD-SCNC: 4.2 MMOL/L (ref 3.5–5.5)
SODIUM BLD-SCNC: 138 MMOL/L (ref 132–146)

## 2018-09-10 PROCEDURE — 93005 ELECTROCARDIOGRAM TRACING: CPT | Performed by: INTERNAL MEDICINE

## 2018-09-10 PROCEDURE — 25010000002 MIDAZOLAM PER 1 MG: Performed by: INTERNAL MEDICINE

## 2018-09-10 PROCEDURE — C1892 INTRO/SHEATH,FIXED,PEEL-AWAY: HCPCS | Performed by: INTERNAL MEDICINE

## 2018-09-10 PROCEDURE — 33208 INSRT HEART PM ATRIAL & VENT: CPT | Performed by: INTERNAL MEDICINE

## 2018-09-10 PROCEDURE — 94770: CPT

## 2018-09-10 PROCEDURE — 99152 MOD SED SAME PHYS/QHP 5/>YRS: CPT | Performed by: INTERNAL MEDICINE

## 2018-09-10 PROCEDURE — 25010000002 FENTANYL CITRATE (PF) 100 MCG/2ML SOLUTION: Performed by: INTERNAL MEDICINE

## 2018-09-10 PROCEDURE — 80048 BASIC METABOLIC PNL TOTAL CA: CPT | Performed by: INTERNAL MEDICINE

## 2018-09-10 PROCEDURE — 02HK3JZ INSERTION OF PACEMAKER LEAD INTO RIGHT VENTRICLE, PERCUTANEOUS APPROACH: ICD-10-PCS | Performed by: INTERNAL MEDICINE

## 2018-09-10 PROCEDURE — 25010000003 CEFAZOLIN IN DEXTROSE 2-4 GM/100ML-% SOLUTION: Performed by: INTERNAL MEDICINE

## 2018-09-10 PROCEDURE — C1898 LEAD, PMKR, OTHER THAN TRANS: HCPCS | Performed by: INTERNAL MEDICINE

## 2018-09-10 PROCEDURE — 99024 POSTOP FOLLOW-UP VISIT: CPT | Performed by: INTERNAL MEDICINE

## 2018-09-10 PROCEDURE — C1785 PMKR, DUAL, RATE-RESP: HCPCS | Performed by: INTERNAL MEDICINE

## 2018-09-10 PROCEDURE — 83735 ASSAY OF MAGNESIUM: CPT

## 2018-09-10 PROCEDURE — 0JH606Z INSERTION OF PACEMAKER, DUAL CHAMBER INTO CHEST SUBCUTANEOUS TISSUE AND FASCIA, OPEN APPROACH: ICD-10-PCS | Performed by: INTERNAL MEDICINE

## 2018-09-10 PROCEDURE — 93010 ELECTROCARDIOGRAM REPORT: CPT | Performed by: INTERNAL MEDICINE

## 2018-09-10 PROCEDURE — 02H63JZ INSERTION OF PACEMAKER LEAD INTO RIGHT ATRIUM, PERCUTANEOUS APPROACH: ICD-10-PCS | Performed by: INTERNAL MEDICINE

## 2018-09-10 DEVICE — LD PM TENDRIL STS 6F52CM 2088TC52: Type: IMPLANTABLE DEVICE | Status: FUNCTIONAL

## 2018-09-10 DEVICE — GEN PM ASSURITY MRI DR RF PM2272: Type: IMPLANTABLE DEVICE | Status: FUNCTIONAL

## 2018-09-10 DEVICE — LD PM TENDRIL ST OPTIM J 7F46CM: Type: IMPLANTABLE DEVICE | Status: FUNCTIONAL

## 2018-09-10 RX ORDER — SODIUM CHLORIDE 9 MG/ML
INJECTION, SOLUTION INTRAVENOUS CONTINUOUS PRN
Status: COMPLETED | OUTPATIENT
Start: 2018-09-10 | End: 2018-09-10

## 2018-09-10 RX ORDER — ONDANSETRON 2 MG/ML
4 INJECTION INTRAMUSCULAR; INTRAVENOUS EVERY 6 HOURS PRN
Status: DISCONTINUED | OUTPATIENT
Start: 2018-09-10 | End: 2018-09-11 | Stop reason: HOSPADM

## 2018-09-10 RX ORDER — MIDAZOLAM HYDROCHLORIDE 1 MG/ML
INJECTION INTRAMUSCULAR; INTRAVENOUS AS NEEDED
Status: DISCONTINUED | OUTPATIENT
Start: 2018-09-10 | End: 2018-09-10 | Stop reason: HOSPADM

## 2018-09-10 RX ORDER — FENTANYL CITRATE 50 UG/ML
INJECTION, SOLUTION INTRAMUSCULAR; INTRAVENOUS AS NEEDED
Status: DISCONTINUED | OUTPATIENT
Start: 2018-09-10 | End: 2018-09-10 | Stop reason: HOSPADM

## 2018-09-10 RX ORDER — SODIUM CHLORIDE 0.9 % (FLUSH) 0.9 %
1-10 SYRINGE (ML) INJECTION AS NEEDED
Status: DISCONTINUED | OUTPATIENT
Start: 2018-09-10 | End: 2018-09-11 | Stop reason: HOSPADM

## 2018-09-10 RX ORDER — OXYCODONE HYDROCHLORIDE AND ACETAMINOPHEN 5; 325 MG/1; MG/1
1 TABLET ORAL EVERY 4 HOURS PRN
Status: DISCONTINUED | OUTPATIENT
Start: 2018-09-10 | End: 2018-09-11 | Stop reason: HOSPADM

## 2018-09-10 RX ORDER — DIGOXIN 125 MCG
125 TABLET ORAL
Status: DISCONTINUED | OUTPATIENT
Start: 2018-09-10 | End: 2018-09-11 | Stop reason: HOSPADM

## 2018-09-10 RX ORDER — ACETAMINOPHEN 325 MG/1
650 TABLET ORAL EVERY 4 HOURS PRN
Status: DISCONTINUED | OUTPATIENT
Start: 2018-09-10 | End: 2018-09-11 | Stop reason: HOSPADM

## 2018-09-10 RX ORDER — ACETAMINOPHEN 650 MG/1
650 SUPPOSITORY RECTAL EVERY 4 HOURS PRN
Status: DISCONTINUED | OUTPATIENT
Start: 2018-09-10 | End: 2018-09-11 | Stop reason: HOSPADM

## 2018-09-10 RX ORDER — BUPIVACAINE HYDROCHLORIDE 5 MG/ML
INJECTION, SOLUTION PERINEURAL AS NEEDED
Status: DISCONTINUED | OUTPATIENT
Start: 2018-09-10 | End: 2018-09-10 | Stop reason: HOSPADM

## 2018-09-10 RX ORDER — CEFAZOLIN SODIUM 2 G/100ML
2 INJECTION, SOLUTION INTRAVENOUS EVERY 8 HOURS
Status: COMPLETED | OUTPATIENT
Start: 2018-09-10 | End: 2018-09-11

## 2018-09-10 RX ORDER — TEMAZEPAM 15 MG/1
15 CAPSULE ORAL NIGHTLY PRN
Status: DISCONTINUED | OUTPATIENT
Start: 2018-09-10 | End: 2018-09-11 | Stop reason: HOSPADM

## 2018-09-10 RX ORDER — LIDOCAINE HYDROCHLORIDE AND EPINEPHRINE 10; 10 MG/ML; UG/ML
INJECTION, SOLUTION INFILTRATION; PERINEURAL AS NEEDED
Status: DISCONTINUED | OUTPATIENT
Start: 2018-09-10 | End: 2018-09-10 | Stop reason: HOSPADM

## 2018-09-10 RX ORDER — OXYCODONE AND ACETAMINOPHEN 7.5; 325 MG/1; MG/1
2 TABLET ORAL EVERY 4 HOURS PRN
Status: DISCONTINUED | OUTPATIENT
Start: 2018-09-10 | End: 2018-09-11 | Stop reason: HOSPADM

## 2018-09-10 RX ORDER — ACETAMINOPHEN 160 MG/5ML
650 SOLUTION ORAL EVERY 4 HOURS PRN
Status: DISCONTINUED | OUTPATIENT
Start: 2018-09-10 | End: 2018-09-11 | Stop reason: HOSPADM

## 2018-09-10 RX ADMIN — PANTOPRAZOLE SODIUM 40 MG: 40 TABLET, DELAYED RELEASE ORAL at 06:52

## 2018-09-10 RX ADMIN — VITAMIN D, TAB 1000IU (100/BT) 1000 UNITS: 25 TAB at 12:20

## 2018-09-10 RX ADMIN — TAMSULOSIN HYDROCHLORIDE 0.4 MG: 0.4 CAPSULE ORAL at 21:34

## 2018-09-10 RX ADMIN — CEFAZOLIN SODIUM 2 G: 2 INJECTION, SOLUTION INTRAVENOUS at 15:57

## 2018-09-10 RX ADMIN — ASPIRIN 81 MG: 81 TABLET, COATED ORAL at 12:20

## 2018-09-10 RX ADMIN — PRAMIPEXOLE DIHYDROCHLORIDE 0.25 MG: 0.25 TABLET ORAL at 21:33

## 2018-09-10 RX ADMIN — APIXABAN 5 MG: 5 TABLET, FILM COATED ORAL at 12:20

## 2018-09-10 RX ADMIN — ROSUVASTATIN CALCIUM 20 MG: 20 TABLET, FILM COATED ORAL at 12:20

## 2018-09-10 RX ADMIN — DOCUSATE SODIUM 100 MG: 100 CAPSULE, LIQUID FILLED ORAL at 21:33

## 2018-09-10 RX ADMIN — CETIRIZINE HYDROCHLORIDE 10 MG: 10 TABLET, FILM COATED ORAL at 12:20

## 2018-09-10 RX ADMIN — Medication 10000 UNITS: at 15:57

## 2018-09-10 RX ADMIN — DIGOXIN 125 MCG: 125 TABLET ORAL at 14:51

## 2018-09-10 RX ADMIN — DOCUSATE SODIUM 100 MG: 100 CAPSULE, LIQUID FILLED ORAL at 12:19

## 2018-09-10 RX ADMIN — METOPROLOL TARTRATE 25 MG: 25 TABLET ORAL at 21:33

## 2018-09-10 RX ADMIN — OXYCODONE HYDROCHLORIDE AND ACETAMINOPHEN 1 TABLET: 5; 325 TABLET ORAL at 14:51

## 2018-09-10 RX ADMIN — DOFETILIDE 250 MCG: 0.25 CAPSULE ORAL at 21:33

## 2018-09-10 RX ADMIN — DOFETILIDE 250 MCG: 0.25 CAPSULE ORAL at 12:20

## 2018-09-10 RX ADMIN — CEFAZOLIN SODIUM 2 G: 2 INJECTION, SOLUTION INTRAVENOUS at 08:13

## 2018-09-10 NOTE — PLAN OF CARE
Problem: Arrhythmia/Dysrhythmia (Symptomatic) (Adult)  Goal: Signs and Symptoms of Listed Potential Problems Will be Absent, Minimized or Managed (Arrhythmia/Dysrhythmia)   09/10/18 0614   Goal/Outcome Evaluation   Problems Assessed (Arrhythmia/Dysrhythmia) all   Problems Present (Dysrhythmia) electrophysiologic conduction defect

## 2018-09-10 NOTE — PROGRESS NOTES
CARDIOLOGY PROGRESS NOTE           9/10/2018 7:41 AM    Admit Date: 9/7/2018    Admit Diagnosis: Syncope, unspecified syncope type [R55]    Chief Compliant: Follow up for afib     Subjective:   Patient's status has been stable overnight. Ongoing Tachy/chloé events      Objective:     Vitals:    09/09/18 1523 09/09/18 1934 09/09/18 2101 09/10/18 0503   BP: 113/70 118/83 128/88 118/77   BP Location: Left arm Left arm  Left arm   Patient Position: Lying Lying  Lying   Pulse: 76 76 106 77   Resp: 18 16  20   Temp: 98 °F (36.7 °C) 97.5 °F (36.4 °C)  97.5 °F (36.4 °C)   TempSrc: Oral Oral  Oral   SpO2:       Weight:       Height:           Physical Exam:  General-Well Nourished, Well developed  Eyes - PERRLA  Neck- supple, No mass  CV- regular rate and rhythm, no MRG  Lung- clear bilaterally  Abd- soft, +BS  Musc/skel - Norm strength and range of motion  Skin- warm and dry  Neuro - Alert & Oriented x 3, appropriate mood.      Current Facility-Administered Medications:   •  apixaban (ELIQUIS) tablet 5 mg, 5 mg, Oral, Q12H, Adis Irvin III, MD, 5 mg at 09/09/18 0952  •  aspirin EC tablet 81 mg, 81 mg, Oral, Daily, Adis Irvin III, MD, 81 mg at 09/09/18 0953  •  ceFAZolin in dextrose (ANCEF) IVPB solution 2 g, 2 g, Intravenous, On Call, Donnell Pineda MD  •  cetirizine (zyrTEC) tablet 10 mg, 10 mg, Oral, Daily, Adis Irvin III, MD, 10 mg at 09/09/18 0953  •  cholecalciferol (VITAMIN D3) tablet 1,000 Units, 1,000 Units, Oral, Daily, Adis Irvin III, MD, 1,000 Units at 09/09/18 0953  •  docusate sodium (COLACE) capsule 100 mg, 100 mg, Oral, BID, Petrona Doshi PA  •  dofetilide (TIKOSYN) capsule 250 mcg, 250 mcg, Oral, Q12H, Adis Irvin III, MD, 250 mcg at 09/09/18 2101  •  Magnesium Sulfate 2 gram Bolus, followed by 8 gram infusion (total Mg dose 10 grams)- Mg less than or equal to 1mg/dL, 2 g, Intravenous, PRN **OR** Magnesium Sulfate 2 gram / 50mL Infusion (GIVE X 3 BAGS TO EQUAL 6GM TOTAL DOSE) - Mg 1.1 -  1.5 mg/dl, 2 g, Intravenous, PRN **OR** Magnesium Sulfate 4 gram infusion- Mg 1.6-1.9 mg/dL, 4 g, Intravenous, PRN, Adis Irvin III, MD, Last Rate: 25 mL/hr at 09/08/18 0042, 4 g at 09/08/18 0042  •  metoprolol tartrate (LOPRESSOR) injection 5 mg, 5 mg, Intravenous, Q6H PRN, Donnell Pineda MD, 5 mg at 09/09/18 0530  •  pantoprazole (PROTONIX) EC tablet 40 mg, 40 mg, Oral, QAM, Adis Irvin III, MD, 40 mg at 09/10/18 0652  •  Pharmacy dosing - Tikosyn, , Does not apply, Once PRN, Adis Irvin III, MD  •  Pharmacy Meds to Bed Consult, , Does not apply, Daily, Adis Irvin III, MD, Stopped at 09/08/18 0902  •  polyethylene glycol 3350 powder (packet), 17 g, Oral, Daily, Petrona Doshi PA  •  potassium chloride (MICRO-K) CR capsule 40 mEq, 40 mEq, Oral, PRN **OR** potassium chloride (KLOR-CON) packet 40 mEq, 40 mEq, Oral, PRN, Adis Irvin III, MD  •  pramipexole (MIRAPEX) tablet 0.25 mg, 0.25 mg, Oral, Nightly, Adis Irvin III, MD, 0.25 mg at 09/09/18 2102  •  rosuvastatin (CRESTOR) tablet 20 mg, 20 mg, Oral, Daily, Adis Irvin III, MD, 20 mg at 09/09/18 0952  •  sodium chloride 0.9 % flush 1-10 mL, 1-10 mL, Intravenous, PRN, Adis Irvin III, MD  •  sodium chloride 0.9 % flush 10 mL, 10 mL, Intravenous, PRN, Reilly Edge MD  •  tamsulosin (FLOMAX) 24 hr capsule 0.4 mg, 0.4 mg, Oral, Nightly, Adis Irvin III, MD, 0.4 mg at 09/09/18 2101  •  vitamin A capsule 10,000 Units, 10,000 Units, Oral, Daily, Adis Irvin III, MD, 10,000 Units at 09/09/18 0953    Data Review:   Recent Results (from the past 24 hour(s))   Basic Metabolic Panel    Collection Time: 09/10/18  5:03 AM   Result Value Ref Range    Glucose 98 70 - 100 mg/dL    BUN 16 9 - 23 mg/dL    Creatinine 1.01 0.60 - 1.30 mg/dL    Sodium 138 132 - 146 mmol/L    Potassium 4.2 3.5 - 5.5 mmol/L    Chloride 106 99 - 109 mmol/L    CO2 25.0 20.0 - 31.0 mmol/L    Calcium 8.4 (L) 8.7 - 10.4 mg/dL    eGFR Non African Amer 71 >60 mL/min/1.73     BUN/Creatinine Ratio 15.8 7.0 - 25.0    Anion Gap 7.0 3.0 - 11.0 mmol/L     Assessment:     Active Problems:    Bradycardia    Syncope    Paroxysmal atrial fibrillation (CMS/HCC)    Atrial flutter, paroxysmal (CMS/HCC)    Tachy-chloé syndrome (CMS/HCC)    Plan:   Today's rounds are unrelated and/or in addition to scheduled procedure for today.  1. Paroxysmal Afib/Atrial Flutter w/ RVR- now in SR w/ runs of afib   - discontinuation of Flecainide and started on Tikosyn 250mcg BID   - will receive 6th dose of Tikosyn this morning. QTc is stable.   - Metoprolol on hold due to tachybrady syndrome. Plan for pacemaker placement and restart rate control meds then. If unable to tolerate meds will plan for AV node Ablation.   - Continue Eliquis for anticoagulation. Will hold for pacemaker     2. Tachybrady syndrome/Syncope    - plan for dual chamber pacemaker implant as we are limited with rate control meds due to bradycardia.     3. Tikosyn start - Monitoring QTc per protocol      Donnell Pineda MD   Cardiology/Electrophysiology

## 2018-09-10 NOTE — PROCEDURES
PRE-ELECTROPHYSIOLOGY STUDY DIAGNOSES:  1. Bradycardia due to nonreversible symptomatic sinus node dysfunction with HR<60 BPM    PROCEDURE PERFORMED:  1. Insertion of a [employer] DDDR pacemaker.  2. Moderate sedation    Anesthesia: Cath lab moderate sedation    I was present with the patient for the duration of moderate sedation and supervised staff who had no other duties and monitored the patient for the entire procedure     Name of independent trained observer: Radhika Del Rio RN  Intra-Service start time: 0813  Intra-Service end time: 0915    Estimated Blood Loss: Less than 10 mL     Specimens: None     PROCEDURE IN DETAIL: The patient was brought into the EP lab in a fasting  state. The left shoulder was prepped and draped in the usual sterile  fashion. Skin anesthetized with lidocaine with epinephrine. Incision made  in the region of the deltopectoral groove. Pocket was made for the  pacemaker. Access obtained in the left subclavian vein via  the Seldinger technique x 2 over which 2 separate guidewires were placed.  Over the first guidewire, a 7-Chadian sheath was placed through which a  [employer] right ventricle lead, model Tendril , 52 cm was placed. The lead  achieved the following values: R waves 9.1mV, threshold  was 0.75 V at 0.5 msec pulse width. This lead was then secured to the  pectoral fascia with 0 Ti-Cron x2. Over the second guidewire, a 7-[place]adian sheath was placed through which a [employer] right  atrial lead, model Tendril , 46 cm was placed, which achieved the following  values: P waves were 2.6mV, threshold was 1.0 volt at 0.4msec pulse width.  This lead was then secured to the pectoral fascia with 0 Ti-Cron x2.  Pocket was irrigated with triple antibiotic flush. The leads were  connected to a [employer] pacemaker, model [employer]urity MRI  serial #[id number]. The leads and pacemaker were then placed  in the pocket area. Fascial layer was closed with 2-0 Vicryl, followed by  a next layer of 3-0 Vicryl,  followed by a superficial layer of staples.  The wound was dressed. The patient recovered from his sedation and  transferred from the lab in a stable condition.    IMPRESSION: Successful implantation of a [employer] MRI pacemaker  for treatment of symptomatic sinus node dysfunction bradycardia    FOLLOW UP PLAN:  1. Wound check in 12-14 days for staple removal

## 2018-09-10 NOTE — PLAN OF CARE
Problem: Patient Care Overview  Goal: Plan of Care Review  Outcome: Ongoing (interventions implemented as appropriate)   09/10/18 0614   Coping/Psychosocial   Plan of Care Reviewed With patient;spouse   Plan of Care Review   Progress improving   OTHER   Outcome Summary Pt rested well this shift. No c/o pain. VSS. HR up to 180s when up to restroom, returns to 70s when back in bed. NSR w/ runs of afib.      Goal: Individualization and Mutuality  Outcome: Ongoing (interventions implemented as appropriate)    Goal: Discharge Needs Assessment  Outcome: Ongoing (interventions implemented as appropriate)      Problem: Fall Risk (Adult)  Goal: Identify Related Risk Factors and Signs and Symptoms  Outcome: Ongoing (interventions implemented as appropriate)    Goal: Absence of Fall  Outcome: Ongoing (interventions implemented as appropriate)    Goal: Identify Related Risk Factors and Signs and Symptoms  Outcome: Ongoing (interventions implemented as appropriate)    Goal: Absence of Fall  Outcome: Ongoing (interventions implemented as appropriate)      Problem: Arrhythmia/Dysrhythmia (Symptomatic) (Adult)  Goal: Signs and Symptoms of Listed Potential Problems Will be Absent, Minimized or Managed (Arrhythmia/Dysrhythmia)  Outcome: Ongoing (interventions implemented as appropriate)

## 2018-09-11 ENCOUNTER — APPOINTMENT (OUTPATIENT)
Dept: GENERAL RADIOLOGY | Facility: HOSPITAL | Age: 83
End: 2018-09-11

## 2018-09-11 VITALS
SYSTOLIC BLOOD PRESSURE: 110 MMHG | TEMPERATURE: 97.5 F | BODY MASS INDEX: 27.75 KG/M2 | HEIGHT: 67 IN | RESPIRATION RATE: 18 BRPM | OXYGEN SATURATION: 92 % | HEART RATE: 73 BPM | DIASTOLIC BLOOD PRESSURE: 65 MMHG | WEIGHT: 176.8 LBS

## 2018-09-11 PROBLEM — Z95.0 PACEMAKER: Status: ACTIVE | Noted: 2018-09-11

## 2018-09-11 LAB
ANION GAP SERPL CALCULATED.3IONS-SCNC: 8 MMOL/L (ref 3–11)
BUN BLD-MCNC: 19 MG/DL (ref 9–23)
BUN/CREAT SERPL: 17.9 (ref 7–25)
CALCIUM SPEC-SCNC: 8.4 MG/DL (ref 8.7–10.4)
CHLORIDE SERPL-SCNC: 101 MMOL/L (ref 99–109)
CO2 SERPL-SCNC: 26 MMOL/L (ref 20–31)
CREAT BLD-MCNC: 1.06 MG/DL (ref 0.6–1.3)
GFR SERPL CREATININE-BSD FRML MDRD: 67 ML/MIN/1.73
GLUCOSE BLD-MCNC: 95 MG/DL (ref 70–100)
MAGNESIUM SERPL-MCNC: 1.8 MG/DL (ref 1.3–2.7)
POTASSIUM BLD-SCNC: 4 MMOL/L (ref 3.5–5.5)
SODIUM BLD-SCNC: 135 MMOL/L (ref 132–146)

## 2018-09-11 PROCEDURE — 25010000003 CEFAZOLIN IN DEXTROSE 2-4 GM/100ML-% SOLUTION: Performed by: INTERNAL MEDICINE

## 2018-09-11 PROCEDURE — 93280 PM DEVICE PROGR EVAL DUAL: CPT | Performed by: INTERNAL MEDICINE

## 2018-09-11 PROCEDURE — 80048 BASIC METABOLIC PNL TOTAL CA: CPT | Performed by: INTERNAL MEDICINE

## 2018-09-11 PROCEDURE — 99024 POSTOP FOLLOW-UP VISIT: CPT | Performed by: INTERNAL MEDICINE

## 2018-09-11 PROCEDURE — 83735 ASSAY OF MAGNESIUM: CPT

## 2018-09-11 PROCEDURE — 93005 ELECTROCARDIOGRAM TRACING: CPT | Performed by: INTERNAL MEDICINE

## 2018-09-11 PROCEDURE — 71046 X-RAY EXAM CHEST 2 VIEWS: CPT

## 2018-09-11 RX ORDER — CALCIUM CARBONATE 300MG(750)
400 TABLET,CHEWABLE ORAL DAILY
Qty: 90 TABLET | Refills: 3 | Status: SHIPPED | OUTPATIENT
Start: 2018-09-11 | End: 2018-09-18 | Stop reason: SDUPTHER

## 2018-09-11 RX ORDER — DIGOXIN 125 MCG
125 TABLET ORAL
Qty: 30 TABLET | Refills: 11 | Status: SHIPPED | OUTPATIENT
Start: 2018-09-11 | End: 2018-09-25 | Stop reason: SDUPTHER

## 2018-09-11 RX ORDER — DOFETILIDE 0.25 MG/1
250 CAPSULE ORAL EVERY 12 HOURS SCHEDULED
Qty: 60 CAPSULE | Refills: 11 | Status: SHIPPED | OUTPATIENT
Start: 2018-09-11 | End: 2018-09-25 | Stop reason: SDUPTHER

## 2018-09-11 RX ORDER — OXYCODONE HYDROCHLORIDE AND ACETAMINOPHEN 5; 325 MG/1; MG/1
1 TABLET ORAL EVERY 6 HOURS PRN
Qty: 5 TABLET | Refills: 0 | Status: SHIPPED | OUTPATIENT
Start: 2018-09-11 | End: 2018-11-15

## 2018-09-11 RX ORDER — CEPHALEXIN 500 MG/1
500 CAPSULE ORAL 3 TIMES DAILY
Qty: 9 CAPSULE | Refills: 0 | Status: SHIPPED | OUTPATIENT
Start: 2018-09-11 | End: 2018-09-14

## 2018-09-11 RX ADMIN — VITAMIN D, TAB 1000IU (100/BT) 1000 UNITS: 25 TAB at 09:25

## 2018-09-11 RX ADMIN — OXYCODONE HYDROCHLORIDE AND ACETAMINOPHEN 1 TABLET: 5; 325 TABLET ORAL at 09:28

## 2018-09-11 RX ADMIN — DOFETILIDE 250 MCG: 0.25 CAPSULE ORAL at 09:25

## 2018-09-11 RX ADMIN — APIXABAN 5 MG: 5 TABLET, FILM COATED ORAL at 09:25

## 2018-09-11 RX ADMIN — OXYCODONE HYDROCHLORIDE AND ACETAMINOPHEN 1 TABLET: 7.5; 325 TABLET ORAL at 00:02

## 2018-09-11 RX ADMIN — Medication 10000 UNITS: at 09:24

## 2018-09-11 RX ADMIN — CETIRIZINE HYDROCHLORIDE 10 MG: 10 TABLET, FILM COATED ORAL at 09:25

## 2018-09-11 RX ADMIN — CEFAZOLIN SODIUM 2 G: 2 INJECTION, SOLUTION INTRAVENOUS at 00:03

## 2018-09-11 RX ADMIN — METOPROLOL TARTRATE 25 MG: 25 TABLET ORAL at 09:25

## 2018-09-11 RX ADMIN — PANTOPRAZOLE SODIUM 40 MG: 40 TABLET, DELAYED RELEASE ORAL at 09:28

## 2018-09-11 RX ADMIN — ASPIRIN 81 MG: 81 TABLET, COATED ORAL at 09:25

## 2018-09-11 NOTE — PROGRESS NOTES
Continued Stay Note  [facility]     Patient Name: Sly Miranda  MRN: [id number]  Today's Date: 9/11/2018    Admit Date: 9/7/2018          Discharge Plan     Row Name 09/11/18 1210       Plan    Plan Home with wife    Patient/Family in Agreement with Plan yes    Plan Comments Paper Scripts sent to [facility] except his Digoxin script. Wife has it and will send to [facility] mail order pharmacy. Pt and wife voice that they follow up with Dr. Huynh in 2 weeks and will have her write for 90 day scripts for his Tikosyn and Metoprolol. They deny any further d/c needs. CM will cont to follow.     Final Discharge Disposition Code 01 - home or self-care              Discharge Codes    No documentation.       Expected Discharge Date and Time     Expected Discharge Date Expected Discharge Time    Sep 11, 2018             Petrona Lee

## 2018-09-11 NOTE — DISCHARGE SUMMARY
Physician Discharge Summary     Patient ID:  Sly Miranda  [id number]  83 y.o.  1935    Admit date: 9/7/2018    Discharge date and time: No discharge date for patient encounter.     Admitting Physician: Adis Irvin III, MD     Primary Physician: Ahmet Benavidez MD    Discharge Physician: Donnell Pineda MD    Admission Diagnoses: Syncope, unspecified syncope type [R55]    Discharge Diagnoses:   Patient Active Problem List    Diagnosis   • Pacemaker [Z95.0]     Overview Note:     1. [facility] [facility] MRI DDDR Pacemaker - Sept 2018     • Paroxysmal atrial fibrillation (CMS/HCC) [I48.0]   • Atrial flutter, paroxysmal (CMS/HCC) [I48.92]   • Tachy-chloé syndrome (CMS/HCC) [I49.5]   • Syncope [R55]   • Chronic atrial fibrillation (CMS/HCC) [I48.2]     Overview Note:     · MAXIM cardioversion, 10/22/2013, Dr. Huynh:  LVEF 55% with trace TR, mild MR.  Successful external cardioversion.  Eliquis initiated with propafenone 150 mg q.12 h.   · EKG, 02/24/2013, revealing normal sinus rhythm at a rate of 64 beats per minute with a normal QTC.   · CHADS score of 1.  · MAXIM in preparation for cardioversion, 09/09/2013, Dr. Huynh:  LVEF in the lower limits of normal, appearance of loosely formed thrombus in left atrial appendage.  Eliquis initiated.  · Propafenone discontinued November 2013 secondary to ineffectiveness.      • Hyperlipidemia LDL goal <100 [E78.5]     Overview Note:     · followed by Dr. Benavidez. on statin therapy.     • Bradycardia [R00.1]     Overview Note:     · Recorded in 2008 with a history of intolerance to AV mele blocking agents.     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 Stable on CT scan since 2009, presumed benign  B. Resection or biopsy deferred.     • Lung nodule, stable [R91.1]       Cardiology Procedures this admission:    1. Tikosyn loading with  the required inpatient monitoring for 6 doses  2. [facility] Pacemaker placement    Hospital Course:   83-year-old gentleman with a history of paroxysmal atrial fibrillation and atrial flutter presents with an episode of syncope at his primary care physician's office.  He was recently evaluated in the [facility] emergency department on August 26 after presenting with a rapid heart rate to [facility] in [place].  Twelve-lead rhythm strip revealed atrial flutter with RVR.  He had received IV adenosine at [facility] and 300 mg load of flecainide.  During his evaluation in the [facility] ER he would intermittently flip into a flutter with variable conduction with heart rates in the 120-140 bpm range whichshe would tolerate very well, nearly asymptomatic.  We started him on flecainide 50 mg by mouth twice a day and Lopressor 25 mg by mouth twice a day.  His wife is been checking his blood pressure and heart rate 2-3 times a day since that time.  Resting heart rates when in sinus rhythm would often trend down to the mid-40s, with intermittent associated lightheadedness.  His Lopressor was subsequently backed down to 12.5 mg by mouth twice a day.  He has had intermittent episodes of heart rates in the 115 to 130 beat per minute range. He was at a routine evaluation at his primary care physician's office on the day of admit and was actually lying down hooked up to an EKG machine when he began to feel hot flushing sensation in his face and then lost consciousness.  Witnesses noticed some shaking of his upper extremities.  Rhythm strip were not obtained during this episode due to its transient nature.  Regained consciousness spontaneously, follow-up EKGs revealed sinus rhythm with premature atrial contractions.  Otherwise he is out that his baseline state of health without chest pain, palpitations or presyncope or syncope or fatigue. The patient underwent pacemaker placement and Tikosyn loading.  Patient  had cardiac device placement. Post device CXR and device check were stable. Please see operation report for full implant details.    Discharge Exam:    Vitals:    09/11/18 0559   BP: 109/61   Pulse: 76   Resp: 18   Temp: 97.6 °F (36.4 °C)   SpO2: 92%      General-Well Nourished, Well developed  Eyes - PERRLA  Neck- supple, No mass  CV- regular rate and rhythm, no MRG, No edema  Lung- clear bilaterally  Abd- soft, +BS  Musc/skel - Norm strength and range of motion  Skin- warm and dry  Neuro - Alert & Oriented x 3, appropriate mood.    Disposition: Patient will be discharged home    Patient discharge medications:      Your medication list      START taking these medications      Instructions Last Dose Given Next Dose Due   cephalexin 500 MG capsule  Commonly known as:  KEFLEX      Take 1 capsule by mouth 3 (Three) Times a Day for 3 days.       digoxin 125 MCG tablet  Commonly known as:  LANOXIN      Take 1 tablet by mouth Daily.       dofetilide 250 MCG capsule  Commonly known as:  TIKOSYN      Take 1 capsule by mouth Every 12 (Twelve) Hours.       Magnesium 400 MG tablet      Take 400 mg by mouth Daily.       oxyCODONE-acetaminophen 5-325 MG per tablet  Commonly known as:  PERCOCET      Take 1 tablet by mouth Every 6 (Six) Hours As Needed for Moderate Pain .          CHANGE how you take these medications      Instructions Last Dose Given Next Dose Due   metoprolol tartrate 25 MG tablet  Commonly known as:  LOPRESSOR  What changed:  · how much to take  · when to take this      Take 1 tablet by mouth 2 (Two) Times a Day.          CONTINUE taking these medications      Instructions Last Dose Given Next Dose Due   aspirin 81 MG EC tablet      Take 81 mg by mouth Every Night.       Calcium-Magnesium-Vitamin D - MG-MG-UNIT tablet sustained-release 24 hour      Take 1 tablet by mouth Daily.       cholecalciferol 1000 units tablet  Commonly known as:  VITAMIN D3      Take 1,000 Units by mouth Daily.        colestipol 1 g tablet  Commonly known as:  COLESTID      Take 2 g by mouth Daily.       CoQ10 400 MG capsule      Take 400 mg by mouth Daily.       ELIQUIS 5 MG tablet tablet  Generic drug:  apixaban      Take 5 mg by mouth Every 12 (Twelve) Hours.       fexofenadine 180 MG tablet  Commonly known as:  ALLEGRA      Take 180 mg by mouth Daily.       omeprazole 40 MG capsule  Commonly known as:  priLOSEC      Take 40 mg by mouth Daily.       pramipexole 0.25 MG tablet  Commonly known as:  MIRAPEX      Take 0.25 mg by mouth Every Night.       rosuvastatin 20 MG tablet  Commonly known as:  CRESTOR      Take 1 tablet by mouth Daily.       silodosin 8 MG capsule capsule  Commonly known as:  RAPAFLO      Take 8 mg by mouth Daily With Breakfast.       vitamin A 8000 UNIT capsule      Take 8,000 Units by mouth Daily.       vitamin b complex capsule capsule      Take 1 capsule by mouth Daily.       Zinc 50 MG capsule      Take 50 mg by mouth Daily.             Where to Get Your Medications      You can get these medications from any pharmacy    Bring a paper prescription for each of these medications  · cephalexin 500 MG capsule  · digoxin 125 MCG tablet  · dofetilide 250 MCG capsule  · Magnesium 400 MG tablet  · metoprolol tartrate 25 MG tablet  · oxyCODONE-acetaminophen 5-325 MG per tablet         Referenced discharge instructions provided by nursing for diet and activity.    Follow-up with Pacemaker/[facility] Clinic in 12 to 14 days    Signed:  Donnell Pineda MD  9/11/2018  7:35 AM

## 2018-09-18 ENCOUNTER — OFFICE VISIT (OUTPATIENT)
Dept: CARDIOLOGY | Facility: HOSPITAL | Age: 83
End: 2018-09-18

## 2018-09-18 ENCOUNTER — HOSPITAL ENCOUNTER (OUTPATIENT)
Dept: CARDIOLOGY | Facility: HOSPITAL | Age: 83
Discharge: HOME | End: 2018-09-18
Admitting: NURSE PRACTITIONER

## 2018-09-18 VITALS
HEIGHT: 67 IN | TEMPERATURE: 97.3 F | HEART RATE: 77 BPM | DIASTOLIC BLOOD PRESSURE: 68 MMHG | OXYGEN SATURATION: 96 % | SYSTOLIC BLOOD PRESSURE: 121 MMHG | BODY MASS INDEX: 27.56 KG/M2 | RESPIRATION RATE: 16 BRPM | WEIGHT: 175.6 LBS

## 2018-09-18 DIAGNOSIS — I48.92 ATRIAL FLUTTER, PAROXYSMAL (HCC): ICD-10-CM

## 2018-09-18 DIAGNOSIS — Z95.0 PACEMAKER: ICD-10-CM

## 2018-09-18 DIAGNOSIS — I49.5 TACHY-BRADY SYNDROME (HCC): ICD-10-CM

## 2018-09-18 DIAGNOSIS — I48.0 PAROXYSMAL ATRIAL FIBRILLATION (HCC): ICD-10-CM

## 2018-09-18 DIAGNOSIS — I48.0 PAROXYSMAL ATRIAL FIBRILLATION (HCC): Primary | ICD-10-CM

## 2018-09-18 PROCEDURE — 93005 ELECTROCARDIOGRAM TRACING: CPT | Performed by: NURSE PRACTITIONER

## 2018-09-18 PROCEDURE — 99214 OFFICE O/P EST MOD 30 MIN: CPT | Performed by: NURSE PRACTITIONER

## 2018-09-18 PROCEDURE — 93010 ELECTROCARDIOGRAM REPORT: CPT | Performed by: INTERNAL MEDICINE

## 2018-09-18 NOTE — PROGRESS NOTES
[facility]  [facility] Valve [facility]      Encounter Date:09/18/2018     Sly Miranda  [address] [facility] [address]  [phone]    1935    Ahmet Benavidez MD    Sly Miranda is a 83 y.o. male.      Subjective:     Chief Complaint:  Atrial Fibrillation (s/p Tikosyn.  s/p PPM for SSS)       HPI     83-year-old male with a history of paroxysmal atrial fibrillation, atrial flutter.  Admitted to [facility] 9/7/18 with atrial flutter RVR.  Had been on flecainide and Lopressor.  Started to have trouble with bradycardia and dizziness.  As well as syncope.  Patient had pacemaker implanted and started on Tikosyn.currently on Digoxin and BB .  Eliquis for stroke prevention.    Pt denies CP, pressure, palpitations.  Reports mild baseline dizziness with standing and ambulating.  Has not worsened or improved since d/c.  Denies sycnope or near syncope.  No falls.  Dyspnea is mild, intermittent, short in duration.  Denies worsening edema.  Denies orthpnea/PND. Denies fever chills, N/V/D.  Denies unilateral weakness, facial droop, confusion, aphasia, vision changes, HA, melena, hematuria, dysuria.  Chest incision soreness.    Patient Active Problem List    Diagnosis   • Pacemaker [Z95.0]     Overview Note:     1. [facility] MRI DDDR Pacemaker - Sept 2018     • Paroxysmal atrial fibrillation (CMS/HCC) [I48.0]   • Atrial flutter, paroxysmal (CMS/HCC) [I48.92]   • Tachy-chloé syndrome (CMS/HCC) [I49.5]   • Syncope [R55]   • Chronic atrial fibrillation (CMS/HCC) [I48.2]     Overview Note:     · MAXIM cardioversion, 10/22/2013, Dr. Huynh:  LVEF 55% with trace TR, mild MR.  Successful external cardioversion.  Eliquis initiated with propafenone 150 mg q.12 h.   · EKG, 02/24/2013, revealing normal sinus rhythm at a rate of 64 beats per minute with a normal QTC.   · CHADS score of 1.  · MAXIM in preparation for cardioversion, 09/09/2013, Dr. Huynh:  LVEF in the lower limits of normal,  appearance of loosely formed thrombus in left atrial appendage.  Eliquis initiated.  · Propafenone discontinued November 2013 secondary to ineffectiveness.   · Echocardiogram 3/7/18: EF 50-55%, mild MR     • Hyperlipidemia LDL goal <100 [E78.5]     Overview Note:     · followed by Dr. Benavidez. on statin therapy.     • Bradycardia [R00.1]     Overview Note:     · Recorded in 2008 with a history of intolerance to AV mele blocking agents.     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 Stable on CT scan since 2009, presumed benign  B. Resection or biopsy deferred.     • Lung nodule, stable [R91.1]         Past Surgical History:   Procedure Laterality Date   • CARDIAC ELECTROPHYSIOLOGY PROCEDURE N/A 9/10/2018    Procedure: Pacemaker DC new;  Surgeon: Donnell Pineda MD;  Location: [facility] INVASIVE LOCATION;  Service: Cardiology   • HERNIA REPAIR  10/20/1997   • KNEE SURGERY Left    • TOTAL KNEE ARTHROPLASTY Right 03/06/2014    Right partial total knee replacement       No Known Allergies      Current Outpatient Prescriptions:   •  aspirin 81 MG EC tablet, Take 81 mg by mouth Every Night., Disp: , Rfl:   •  B Complex Vitamins (VITAMIN B COMPLEX) capsule capsule, Take 1 capsule by mouth Daily., Disp: , Rfl:   •  Calcium-Magnesium-Vitamin D - MG-MG-UNIT tablet sustained-release 24 hour, Take 1 tablet by mouth Daily., Disp: , Rfl:   •  Coenzyme Q10 (COQ10) 400 MG capsule, Take 400 mg by mouth Daily., Disp: , Rfl:   •  colestipol (COLESTID) 1 G tablet, Take 2 g by mouth Daily., Disp: , Rfl:   •  digoxin (LANOXIN) 125 MCG tablet, Take 1 tablet by mouth Daily., Disp: 30 tablet, Rfl: 11  •  dofetilide (TIKOSYN) 250 MCG capsule, Take 1 capsule by mouth Every 12 (Twelve) Hours., Disp: 60 capsule, Rfl: 11  •  ELIQUIS 5 MG tablet tablet, Take 5 mg by mouth Every 12 (Twelve) Hours., Disp: ,  "Rfl:   •  Magnesium Oxide 400 (240 Mg) MG tablet, Take 1 tablet by mouth Daily., Disp: 90 tablet, Rfl: 3  •  metoprolol tartrate (LOPRESSOR) 25 MG tablet, Take 1 tablet by mouth 2 (Two) Times a Day., Disp: 60 tablet, Rfl: 11  •  omeprazole (PriLOSEC) 40 MG capsule, Take 40 mg by mouth Daily., Disp: , Rfl:   •  oxyCODONE-acetaminophen (PERCOCET) 5-325 MG per tablet, Take 1 tablet by mouth Every 6 (Six) Hours As Needed for Moderate Pain ., Disp: 5 tablet, Rfl: 0  •  pramipexole (MIRAPEX) 0.25 MG tablet, Take 0.25 mg by mouth Every Night., Disp: , Rfl:   •  rosuvastatin (CRESTOR) 20 MG tablet, Take 1 tablet by mouth Daily., Disp: 90 tablet, Rfl: 1  •  silodosin (RAPAFLO) 8 MG capsule capsule, Take 8 mg by mouth Daily With Breakfast., Disp: , Rfl:   •  vitamin A 8000 UNIT capsule, Take 8,000 Units by mouth Daily., Disp: , Rfl:   •  Zinc 50 MG capsule, Take 50 mg by mouth Daily., Disp: , Rfl:     The following portions of the patient's history were reviewed and updated as appropriate: allergies, current medications, past family history, past medical history, past social history, past surgical history and problem list.    Review of Systems   Cardiovascular: Negative for chest pain, dyspnea on exertion, leg swelling, near-syncope, orthopnea, palpitations and syncope.   Neurological: Positive for dizziness.   All other systems reviewed and are negative.      Objective:     Vitals:    09/18/18 1229 09/18/18 1231 09/18/18 1232   BP: 124/69 127/67 121/68   BP Location: Right arm Left arm Left arm   Patient Position: Sitting Sitting Standing   Pulse: 70  77   Resp: 16     Temp: 97.3 °F (36.3 °C)     TempSrc: Temporal Artery      SpO2: 96%     Weight: 79.7 kg (175 lb 9.6 oz)     Height: 170.2 cm (67\")           Physical Exam   Constitutional: He is oriented to person, place, and time. He appears well-developed and well-nourished. No distress.   HENT:   Head: Normocephalic and atraumatic.   Mouth/Throat: Oropharynx is clear and " moist.   Eyes: Pupils are equal, round, and reactive to light. Conjunctivae are normal. No scleral icterus.   Neck: No hepatojugular reflux and no JVD present. Carotid bruit is not present. No tracheal deviation present. No thyromegaly present.   Cardiovascular: Normal rate, regular rhythm, normal heart sounds and intact distal pulses.  Exam reveals no friction rub.    No murmur heard.  Pulmonary/Chest: Effort normal and breath sounds normal.   Abdominal: Soft. Bowel sounds are normal. He exhibits no distension. There is no tenderness.   Musculoskeletal: He exhibits no edema.   Lymphadenopathy:     He has no cervical adenopathy.   Neurological: He is alert and oriented to person, place, and time.   Skin: Skin is warm, dry and intact. No rash noted. No cyanosis or erythema. No pallor.   Psychiatric: He has a normal mood and affect. His behavior is normal. Thought content normal.   Vitals reviewed.      Lab and Diagnostic Review:  Lab Results   Component Value Date    WBC 9.66 09/07/2018    HGB 15.9 09/07/2018    HCT 46.8 09/07/2018    MCV 84.5 09/07/2018     09/07/2018     Lab Results   Component Value Date    GLUCOSE 95 09/11/2018    BUN 19 09/11/2018    CREATININE 1.06 09/11/2018    EGFRIFNONA 67 09/11/2018    BCR 17.9 09/11/2018    K 4.0 09/11/2018    CO2 26.0 09/11/2018    CALCIUM 8.4 (L) 09/11/2018    ALBUMIN 4.35 09/07/2018    LABIL2 1.4 03/28/2016    AST 34 (H) 09/07/2018    ALT 44 (H) 09/07/2018     Lab Results   Component Value Date    TSH 3.342 08/26/2018       Assessment and Plan:         1. Paroxysmal atrial fibrillation (CMS/HCC)    - ECG 12 Lead; atrial paced, 70 bpm   ms  Tikosyn, digoxin, BB  Eliquis for stroke prevention    2. Atrial flutter, paroxysmal (CMS/HCC)    - ECG 12 Lead; Future    3. Tachy-chloé syndrome (CMS/HCC)  S/p PPM    4. Pacemaker    Wound check as scheduled  No s/s infection currently  Education provided.    F/u as needed, prn or as determined by C      *Please note  that portions of this note were completed with a voice recognition program. Efforts were made to edit the dictations, but occasionally words are mistranscribed.

## 2018-09-25 ENCOUNTER — OFFICE VISIT (OUTPATIENT)
Dept: CARDIOLOGY | Facility: CLINIC | Age: 83
End: 2018-09-25

## 2018-09-25 DIAGNOSIS — Z48.89 ENCOUNTER FOR POSTOPERATIVE WOUND CHECK: Primary | ICD-10-CM

## 2018-09-25 PROCEDURE — 99024 POSTOP FOLLOW-UP VISIT: CPT | Performed by: INTERNAL MEDICINE

## 2018-09-25 RX ORDER — DOFETILIDE 0.25 MG/1
250 CAPSULE ORAL EVERY 12 HOURS SCHEDULED
Qty: 90 CAPSULE | Refills: 3 | Status: SHIPPED | OUTPATIENT
Start: 2018-09-25 | End: 2018-12-26 | Stop reason: SDUPTHER

## 2018-09-25 RX ORDER — DIGOXIN 125 MCG
125 TABLET ORAL
Qty: 90 TABLET | Refills: 3 | OUTPATIENT
Start: 2018-09-25 | End: 2018-11-15

## 2018-09-25 NOTE — PROGRESS NOTES
2018    Sly Guillory Miranda, : 1935    WOUND CHECK      Patient has fever: [] YES   [x] NO     Temperature if indicated:       Wound Location:  Left shoulder      Dressing was:  Replaced       Old Dressing Appearance:  Clean, dry        Wound Appearance:  Incision well-approximated with no signs or symptoms of infection        Gloves used, staples removed without diffuculty, wound cleansed with alcohol       Incision dresssed with triple antibiotic ointment, 4x4, and tegaderm with patient to remove in 3 days.  Verbal understanding from patient       Device was: Interrogated - Please see separate report        Plan:  Normal wound check      Appointment for follow-up scheduled for 3 months post procedure [x]    Future Appointments  Date Time Provider Department Center   2018 10:45 AM Ana Huynh MD [facility] None   2019 3:30 PM Donnell Pineda MD [facility] None           Marisela Crowe RN, 18

## 2018-10-15 ENCOUNTER — CLINICAL SUPPORT NO REQUIREMENTS (OUTPATIENT)
Dept: CARDIOLOGY | Facility: CLINIC | Age: 83
End: 2018-10-15

## 2018-10-15 DIAGNOSIS — I48.0 PAROXYSMAL ATRIAL FIBRILLATION (HCC): Primary | ICD-10-CM

## 2018-10-15 DIAGNOSIS — R00.1 BRADYCARDIA: ICD-10-CM

## 2018-11-01 ENCOUNTER — OFFICE VISIT (OUTPATIENT)
Dept: CARDIOLOGY | Facility: CLINIC | Age: 83
End: 2018-11-01

## 2018-11-01 VITALS
WEIGHT: 172.6 LBS | HEART RATE: 73 BPM | DIASTOLIC BLOOD PRESSURE: 72 MMHG | HEIGHT: 67 IN | SYSTOLIC BLOOD PRESSURE: 110 MMHG | BODY MASS INDEX: 27.09 KG/M2

## 2018-11-01 DIAGNOSIS — I48.0 PAROXYSMAL ATRIAL FIBRILLATION (HCC): Primary | ICD-10-CM

## 2018-11-01 DIAGNOSIS — E78.5 HYPERLIPIDEMIA LDL GOAL <100: ICD-10-CM

## 2018-11-01 PROCEDURE — 93010 ELECTROCARDIOGRAM REPORT: CPT | Performed by: INTERNAL MEDICINE

## 2018-11-01 PROCEDURE — 99213 OFFICE O/P EST LOW 20 MIN: CPT | Performed by: INTERNAL MEDICINE

## 2018-11-01 NOTE — PROGRESS NOTES
Sly Miranda  1935  83 y.o.  [phone]        11/01/2018    Ahmet Benavidez MD    Chief Complaint   Patient presents with   • Atrial Fibrillation       Problem List:  1. Chronic atrial fibrillation:  a. MAXIM/ECV, 10/22/2013, Dr. Huynh: LVEF 55%. Trace TR, mild MR. Successful external cardioversion to sinus rhythm.  Eliquis initiated with propafenone 150 mg q.12 h.   b. EKG, 02/24/2013, revealing NSR at a rate of 64 bpm with a normal  QTC.   c. CHADS score of 1.  d. MAXIM in preparation for cardioversion, 09/09/2013, Dr. Huynh:  LVEF in the lower limits of normal with a large amount of spontaneous contrast and the appearance of loosely formed thrombus in the left atrial appendage.  Eliquis initiated.  e. Propafenone discontinued November 2013 secondary to ineffectiveness.   f. Echo, 03/07/2018: EF 50-55%. Trace-to-mild MR. Mild TR.  g. CT Angio of chest, 03/04/2018: no evidence of pulmonary embolus.  h. Holter, 08/21/2018: Bradycardia with long pauses.  2. Bradycardia:  a. Recorded in 2008 with a history of intolerance  to AV mele blocking agents.  b. Insertion of [employer] DDDR Pacemaker, PM 2272  SN [id number]  On 09/10/2018.  3. Dyslipidemia, on statin therapy.  4. Abdominal pain prompting admission to [facility], 09/06/2013:  a.  CT scan of the abdomen and pelvis showing no acute abnormality, 09/06/2013.  5. Mesenteric calcified mass:  a. Recent CT scan, June 2010,  by Dr. Aguilera, unchanged from prior.  6. Solitary pulmonary nodule:  a. X-ray chest pa and lateral, 9/6/2016: Chronic change; no active disease.  7. GERD.  8. Surgical history:  a. Hernia surgery, 10/20/1997.  b. 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 Diaz.    No Known Allergies    Current Medications:      Current Outpatient Prescriptions:   •  aspirin 81 MG EC tablet, Take 81 mg by mouth Every Night., Disp: , Rfl:   •  B Complex Vitamins (VITAMIN B COMPLEX) capsule capsule, Take 1 capsule by mouth  Daily., Disp: , Rfl:   •  Calcium-Magnesium-Vitamin D - MG-MG-UNIT tablet sustained-release 24 hour, Take 1 tablet by mouth Daily., Disp: , Rfl:   •  Coenzyme Q10 (COQ10) 400 MG capsule, Take 400 mg by mouth Daily., Disp: , Rfl:   •  colestipol (COLESTID) 1 G tablet, Take 2 g by mouth Daily., Disp: , Rfl:   •  digoxin (LANOXIN) 125 MCG tablet, Take 1 tablet by mouth Daily., Disp: 90 tablet, Rfl: 3  •  dofetilide (TIKOSYN) 250 MCG capsule, Take 1 capsule by mouth Every 12 (Twelve) Hours., Disp: 90 capsule, Rfl: 3  •  ELIQUIS 5 MG tablet tablet, Take 5 mg by mouth Every 12 (Twelve) Hours., Disp: , Rfl:   •  Magnesium Oxide 400 (240 Mg) MG tablet, Take 1 tablet by mouth Daily., Disp: 90 tablet, Rfl: 3  •  metoprolol tartrate (LOPRESSOR) 25 MG tablet, Take 25 mg by mouth 2 (Two) Times a Day., Disp: , Rfl:   •  omeprazole (PriLOSEC) 40 MG capsule, Take 40 mg by mouth Daily., Disp: , Rfl:   •  pramipexole (MIRAPEX) 0.25 MG tablet, Take 0.25 mg by mouth Every Night., Disp: , Rfl:   •  rosuvastatin (CRESTOR) 20 MG tablet, Take 1 tablet by mouth Daily., Disp: 90 tablet, Rfl: 1  •  silodosin (RAPAFLO) 8 MG capsule capsule, Take 8 mg by mouth Daily With Breakfast., Disp: , Rfl:   •  vitamin A 8000 UNIT capsule, Take 8,000 Units by mouth Daily., Disp: , Rfl:   •  Zinc 50 MG capsule, Take 50 mg by mouth Daily., Disp: , Rfl:   •  oxyCODONE-acetaminophen (PERCOCET) 5-325 MG per tablet, Take 1 tablet by mouth Every 6 (Six) Hours As Needed for Moderate Pain ., Disp: 5 tablet, Rfl: 0    HPI    Sly Miranda is a 83 y.o. male who presents today for annual follow up of chronic atrial fibrillation and dyslipidemia. He experiences light-headedness and dizziness, worsened by exertion to the point where he feels close to passing out. These episodes occur with different BPs, and not just with low BPs. He was taken off Metoprolol in September and does not recall why. He also experiences shortness of breath, and his wife  "states he feels worse than before he had his pacemaker implanted. Patient denies chest pain, palpitations, edema, and syncope.    The following portions of the patient's history were reviewed and updated as appropriate: allergies, current medications and problem list.    Pertinent positives as listed in the HPI.  All other systems reviewed are negative.    Vitals:    11/01/18 1047   BP: 110/72   BP Location: Right arm   Patient Position: Sitting   Cuff Size: Adult   Pulse: 73   Weight: 78.3 kg (172 lb 9.6 oz)   Height: 170.2 cm (67\")       Physical Exam:    General: Alert and oriented  Neck: Jugular venous pressure is within normal limits. Carotids have normal upstrokes without bruits.   Cardiovascular: Heart has a nondisplaced focal PMI. Regular rate and rhythm without murmur, gallop or rub.  Lungs: Clear without rales or wheezes. Equal expansion is noted.   Extremities: Show no edema.  Skin: warm and dry.  Neurologic: nonfocal    Diagnostic Data:  Lab Results   Component Value Date    GLUCOSE 95 09/11/2018    BUN 19 09/11/2018    CREATININE 1.06 09/11/2018    EGFRIFNONA 67 09/11/2018    BCR 17.9 09/11/2018    K 4.0 09/11/2018    CO2 26.0 09/11/2018    CALCIUM 8.4 (L) 09/11/2018    ALBUMIN 4.35 09/07/2018    LABIL2 1.4 03/28/2016    AST 34 (H) 09/07/2018    ALT 44 (H) 09/07/2018     Lab Results   Component Value Date    GLUCOSE 95 09/11/2018    CALCIUM 8.4 (L) 09/11/2018     09/11/2018    K 4.0 09/11/2018    CO2 26.0 09/11/2018     09/11/2018    BUN 19 09/11/2018    CREATININE 1.06 09/11/2018    EGFRIFNONA 67 09/11/2018    BCR 17.9 09/11/2018    ANIONGAP 8.0 09/11/2018     Lab Results   Component Value Date    WBC 9.66 09/07/2018    HGB 15.9 09/07/2018    HCT 46.8 09/07/2018    MCV 84.5 09/07/2018     09/07/2018     Lab Results   Component Value Date    TSH 3.342 08/26/2018           ECG 12 Lead  Date/Time: 11/1/2018 11:11 AM  Performed by: PAULINA BELL  Authorized by: RAY, " PAULINA KAUFMAN   Rhythm: sinus rhythm  BPM: 70  Conduction: 1st degree            Assessment:      ICD-10-CM ICD-9-CM   1. Paroxysmal atrial fibrillation (CMS/HCC) I48.0 427.31   2. Hyperlipidemia LDL goal <100 E78.5 272.4       Plan:    1. Continue current medications.  2. F/up in 2 weeks with [employer] device check or sooner if needed.    Scribed for Paulina Huynh MD by Neela Stewart. 11/1/2018  11:22 AM     I Paulina Huynh MD personally performed the services described in this documentation as scribed by the above individual in my presence, and it is both accurate and complete.    Paulina Huynh MD, FACC

## 2018-11-15 ENCOUNTER — OFFICE VISIT (OUTPATIENT)
Dept: CARDIOLOGY | Facility: CLINIC | Age: 83
End: 2018-11-15

## 2018-11-15 VITALS
HEART RATE: 67 BPM | DIASTOLIC BLOOD PRESSURE: 60 MMHG | HEIGHT: 67 IN | WEIGHT: 173.2 LBS | BODY MASS INDEX: 27.18 KG/M2 | SYSTOLIC BLOOD PRESSURE: 132 MMHG

## 2018-11-15 DIAGNOSIS — I48.0 PAROXYSMAL ATRIAL FIBRILLATION (HCC): Primary | ICD-10-CM

## 2018-11-15 DIAGNOSIS — E78.5 HYPERLIPIDEMIA LDL GOAL <100: ICD-10-CM

## 2018-11-15 PROCEDURE — 93280 PM DEVICE PROGR EVAL DUAL: CPT | Performed by: INTERNAL MEDICINE

## 2018-11-15 PROCEDURE — 99214 OFFICE O/P EST MOD 30 MIN: CPT | Performed by: INTERNAL MEDICINE

## 2018-11-15 RX ORDER — TRIAMCINOLONE ACETONIDE 1 MG/G
CREAM TOPICAL 2 TIMES DAILY
COMMUNITY

## 2018-11-15 NOTE — PROGRESS NOTES
Sly Miranda  1935  83 y.o.  [phone]  [phone]      11/15/2018    Ahmet Benavidez MD    No chief complaint on file.      Problem List:  1. Paroxysmal atrial fibrillation:  a. MAXIM/ECV, 10/22/2013, Dr. Huynh: LVEF 55%. Trace TR, mild MR. Successful ECV to sinus rhythm. Eliquis initiated with propafenone 150 mg q.12 h.   b. EKG, 02/24/2013, revealing NSR at a rate of 64 bpm with a normal  QTC.   c. CHADS score of 1.  d. MAXIM in preparation for cardioversion, 09/09/2013, Dr. Huynh:  LVEF in the lower limits of normal with a large amount of spontaneous contrast and the appearance of loosely formed thrombus in the left atrial appendage. Eliquis initiated.  e. Propafenone discontinued November 2013 secondary to ineffectiveness.   f. Echo, 03/07/2018: EF 50-55%. Trace-to-mild MR. Mild TR.  g. CT Angio of chest, 03/04/2018: no evidence of pulmonary embolus.  h. Holter, 08/21/2018: Bradycardia with long pauses.  2. Bradycardia:  a. Recorded in 2008 with a history of intolerance  to AV mele blocking agents.  b. Insertion of [employer] DDDR Pacemaker, 09/10/2018: PM 2272  SN [id number].  3. Dyslipidemia, on statin therapy.  4. Abdominal pain prompting admission to [facility], 09/06/2013:  a.  CT scan of the abdomen and pelvis showing no acute abnormality, 09/06/2013.  5. Mesenteric calcified mass:  a. Recent CT scan, June 2010,  by Dr. Aguilera, unchanged from prior.  6. Solitary pulmonary nodule:  a. X-ray chest pa and lateral, 9/6/2016: Chronic change; no active disease.  7. GERD.  8. Surgical history:  a. Hernia surgery, 10/20/1997.  b. 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 Diaz.    No Known Allergies    Current Medications:      Current Outpatient Medications:   •  aspirin 81 MG EC tablet, Take 81 mg by mouth Every Night., Disp: , Rfl:   •  B Complex Vitamins (VITAMIN B COMPLEX) capsule capsule, Take 1 capsule by mouth Daily., Disp: , Rfl:   •  Calcium-Magnesium-Vitamin D  - MG-MG-UNIT tablet sustained-release 24 hour, Take 1 tablet by mouth Daily., Disp: , Rfl:   •  Coenzyme Q10 (COQ10) 400 MG capsule, Take 400 mg by mouth Daily., Disp: , Rfl:   •  colestipol (COLESTID) 1 G tablet, Take 2 g by mouth Daily., Disp: , Rfl:   •  dofetilide (TIKOSYN) 250 MCG capsule, Take 1 capsule by mouth Every 12 (Twelve) Hours., Disp: 90 capsule, Rfl: 3  •  ELIQUIS 5 MG tablet tablet, Take 5 mg by mouth Every 12 (Twelve) Hours., Disp: , Rfl:   •  Magnesium Oxide 400 (240 Mg) MG tablet, Take 1 tablet by mouth Daily., Disp: 90 tablet, Rfl: 3  •  omeprazole (PriLOSEC) 40 MG capsule, Take 40 mg by mouth Daily., Disp: , Rfl:   •  pramipexole (MIRAPEX) 0.25 MG tablet, Take 0.25 mg by mouth Every Night., Disp: , Rfl:   •  rosuvastatin (CRESTOR) 20 MG tablet, Take 1 tablet by mouth Daily., Disp: 90 tablet, Rfl: 1  •  silodosin (RAPAFLO) 8 MG capsule capsule, Take 8 mg by mouth Daily With Breakfast., Disp: , Rfl:   •  triamcinolone (KENALOG) 0.1 % cream, Apply  topically to the appropriate area as directed 2 (Two) Times a Day., Disp: , Rfl:   •  vitamin A 8000 UNIT capsule, Take 8,000 Units by mouth Daily., Disp: , Rfl:   •  Zinc 50 MG capsule, Take 50 mg by mouth Daily., Disp: , Rfl:     HPI    Sly Miranda is a 83 y.o. male who presents today for two week follow up of atrial fibrillation and hyperlipidemia. Since last visit, he has had tachycardic episodes, which he is sometimes aware of. Patient has not been taking his Metoprolol as his wife states they have been told he could discontinue it. He is happy with the healing of his pacemaker implantation site, which is much less painful. Patient denies chest pain, palpitations, shortness of breath, edema, dizziness, and syncope.     The following portions of the patient's history were reviewed and updated as appropriate: allergies, current medications and problem list.    Pertinent positives as listed in the HPI.  All other systems reviewed are  "negative.    Vitals:    11/15/18 1539   BP: 132/60   BP Location: Left arm   Patient Position: Sitting   Pulse: 67   Weight: 78.6 kg (173 lb 3.2 oz)   Height: 170.2 cm (67\")       Physical Exam:    General: Alert and oriented  Neck: Jugular venous pressure is within normal limits. Carotids have normal upstrokes without bruits.   Cardiovascular: Heart has a nondisplaced focal PMI. Regular rate and rhythm without murmur, gallop or rub.  Lungs: Clear without rales or wheezes. Equal expansion is noted.   Extremities: Show no edema.  Skin: warm and dry.  Neurologic: nonfocal    Diagnostic Data:  Lab Results   Component Value Date    GLUCOSE 95 09/11/2018    BUN 19 09/11/2018    CREATININE 1.06 09/11/2018    EGFRIFNONA 67 09/11/2018    BCR 17.9 09/11/2018    K 4.0 09/11/2018    CO2 26.0 09/11/2018    CALCIUM 8.4 (L) 09/11/2018    ALBUMIN 4.35 09/07/2018    LABIL2 1.4 03/28/2016    AST 34 (H) 09/07/2018    ALT 44 (H) 09/07/2018     Lab Results   Component Value Date    GLUCOSE 95 09/11/2018    CALCIUM 8.4 (L) 09/11/2018     09/11/2018    K 4.0 09/11/2018    CO2 26.0 09/11/2018     09/11/2018    BUN 19 09/11/2018    CREATININE 1.06 09/11/2018    EGFRIFNONA 67 09/11/2018    BCR 17.9 09/11/2018    ANIONGAP 8.0 09/11/2018     Lab Results   Component Value Date    WBC 9.66 09/07/2018    HGB 15.9 09/07/2018    HCT 46.8 09/07/2018    MCV 84.5 09/07/2018     09/07/2018     Lab Results   Component Value Date    TSH 3.342 08/26/2018     Last FLP, 02/06/2017:  CHOL 128  TRIG 145  HDL 46  LDL 53    Procedures    DEVICE INTERROGATION: 11/15/2018, [employer] PPM:   RA pacing 76%, RV pacing 5.8%. P wave is 2.4 mV with a threshold of 0.62 V at 0.5 msec and an impedance of 390 ohms. R wave is > 12 mV with a threshold of 0.75 V at 0.4 msec and an impedance of 700 ohms.  Battery voltage is 3.02 V (7.9-8.9 years).  Events: 4% AMS, longest 5 hours 19 minutes. 15 x HVR (EGM).  Suspicious for aflutter.  A caption confirm turned " on.     Assessment:      ICD-10-CM ICD-9-CM   1. Paroxysmal atrial fibrillation (CMS/HCC) I48.0 427.31   2. Hyperlipidemia LDL goal <100 E78.5 272.4       Plan:    1. Resume Metoprolol 25 mg BID.  2. Continue Eliquis for stroke prevention with atrial fibrillation.  3. Continue Tikosyn for rhythm control.  4. Continue Rosuvastatin 20 mg for hyperlipidemia.  5. Continue current medications.  6. F/up in 2 months with a [employer] device interrogation, or sooner if needed.    Scribed for Ana Huynh MD by Neela Stewart. 11/15/2018  4:01 PM     I Ana Huynh MD personally performed the services described in this documentation as scribed by the above individual in my presence, and it is both accurate and complete.    Ana Huynh MD, FACC

## 2018-12-03 ENCOUNTER — CLINICAL SUPPORT NO REQUIREMENTS (OUTPATIENT)
Dept: CARDIOLOGY | Facility: CLINIC | Age: 83
End: 2018-12-03

## 2018-12-03 DIAGNOSIS — I48.91 ATRIAL FIBRILLATION, UNSPECIFIED TYPE (HCC): Primary | ICD-10-CM

## 2018-12-10 ENCOUNTER — CLINICAL SUPPORT NO REQUIREMENTS (OUTPATIENT)
Dept: CARDIOLOGY | Facility: CLINIC | Age: 83
End: 2018-12-10

## 2018-12-10 DIAGNOSIS — I48.0 PAROXYSMAL ATRIAL FIBRILLATION (HCC): Primary | ICD-10-CM

## 2018-12-10 DIAGNOSIS — I48.92 ATRIAL FLUTTER, PAROXYSMAL (HCC): Primary | ICD-10-CM

## 2018-12-11 ENCOUNTER — HOSPITAL ENCOUNTER (OUTPATIENT)
Facility: HOSPITAL | Age: 83
Discharge: HOME | End: 2018-12-11
Payer: MEDICARE

## 2018-12-11 PROCEDURE — 93005 ELECTROCARDIOGRAM TRACING: CPT

## 2018-12-12 ENCOUNTER — TELEPHONE (OUTPATIENT)
Dept: CARDIOLOGY | Facility: CLINIC | Age: 83
End: 2018-12-12

## 2018-12-12 DIAGNOSIS — I48.0 PAROXYSMAL ATRIAL FIBRILLATION (HCC): Primary | ICD-10-CM

## 2018-12-12 NOTE — TELEPHONE ENCOUNTER
Discussed recent EKG with pt/wife- Per [facility], may increase Tikosyn to 500 mg Q AM and leave at 250 mg Q PM- will need EKG 2-3 days after increase- he will increase starting Friday and will go Monday for EKG- will fax order to [facility]-

## 2018-12-17 ENCOUNTER — HOSPITAL ENCOUNTER (OUTPATIENT)
Facility: HOSPITAL | Age: 83
Discharge: HOME | End: 2018-12-17
Payer: MEDICARE

## 2018-12-17 PROCEDURE — 93005 ELECTROCARDIOGRAM TRACING: CPT

## 2018-12-26 RX ORDER — APIXABAN 5 MG/1
5 TABLET, FILM COATED ORAL EVERY 12 HOURS SCHEDULED
Qty: 180 TABLET | Refills: 3 | Status: SHIPPED | OUTPATIENT
Start: 2018-12-26 | End: 2020-07-10 | Stop reason: SDUPTHER

## 2018-12-26 RX ORDER — DOFETILIDE 0.25 MG/1
CAPSULE ORAL
Qty: 270 CAPSULE | Refills: 1 | Status: SHIPPED | OUTPATIENT
Start: 2018-12-26 | End: 2018-12-27 | Stop reason: SDUPTHER

## 2018-12-27 RX ORDER — DOFETILIDE 0.25 MG/1
CAPSULE ORAL
Qty: 270 CAPSULE | Refills: 1
Start: 2018-12-27 | End: 2018-12-27 | Stop reason: SDUPTHER

## 2018-12-27 RX ORDER — DOFETILIDE 0.25 MG/1
CAPSULE ORAL
Qty: 90 CAPSULE | Refills: 0 | Status: SHIPPED | OUTPATIENT
Start: 2018-12-27 | End: 2019-01-18 | Stop reason: SDUPTHER

## 2018-12-28 ENCOUNTER — TELEPHONE (OUTPATIENT)
Dept: CARDIOLOGY | Facility: CLINIC | Age: 83
End: 2018-12-28

## 2018-12-28 NOTE — TELEPHONE ENCOUNTER
PT's wife calls to report that PT started to feel bad and checked his HR and BP and his HR was low, in the 40's and he was in afib- I asked that he send a device transmission via his home box-     Ruddy Ibarra checked for the transmission but states that nothing has come through yet- she will continue to check-    I advised PT to come on to the ER here at [facility] but they refused to come at this time- I did encourage them to come on to the ER if his condition worsens and they verbalized understanding-     Will check for transmission again on Monday- pt is anticoagulated with eliquis and tolerating well

## 2019-01-17 ENCOUNTER — OFFICE VISIT (OUTPATIENT)
Dept: CARDIOLOGY | Facility: CLINIC | Age: 84
End: 2019-01-17

## 2019-01-17 ENCOUNTER — HOSPITAL ENCOUNTER (OUTPATIENT)
Facility: HOSPITAL | Age: 84
Discharge: HOME | End: 2019-01-17
Payer: MEDICARE

## 2019-01-17 VITALS
WEIGHT: 170 LBS | HEART RATE: 71 BPM | DIASTOLIC BLOOD PRESSURE: 68 MMHG | SYSTOLIC BLOOD PRESSURE: 112 MMHG | BODY MASS INDEX: 26.68 KG/M2 | HEIGHT: 67 IN

## 2019-01-17 DIAGNOSIS — I48.0 PAROXYSMAL ATRIAL FIBRILLATION (HCC): Primary | ICD-10-CM

## 2019-01-17 DIAGNOSIS — E78.5 HYPERLIPIDEMIA LDL GOAL <100: ICD-10-CM

## 2019-01-17 DIAGNOSIS — R00.1 BRADYCARDIA: ICD-10-CM

## 2019-01-17 PROCEDURE — 93000 ELECTROCARDIOGRAM COMPLETE: CPT | Performed by: NURSE PRACTITIONER

## 2019-01-17 PROCEDURE — 93005 ELECTROCARDIOGRAM TRACING: CPT

## 2019-01-17 PROCEDURE — 99214 OFFICE O/P EST MOD 30 MIN: CPT | Performed by: NURSE PRACTITIONER

## 2019-01-17 RX ORDER — LEVOTHYROXINE SODIUM 0.05 MG/1
50 TABLET ORAL DAILY
COMMUNITY

## 2019-01-17 NOTE — PROGRESS NOTES
Sly Miranda  1935  83 y.o.  [phone]  [phone]      01/17/2019    Ahmet Benavidez MD    Chief Complaint   Patient presents with   • Atrial Fibrillation       Problem List:  1. Paroxysmal atrial fibrillation:  a. MAXIM/ECV, 10/22/2013, Dr. Huynh: EF 55%. Trace TR, mild MR. Successful ECV to sinus rhythm. Eliquis initiated with propafenone 150 mg q.12 h.   b. EKG, 02/24/2013, revealing NSR at a rate of 64 bpm with a normal  QTC.   c. CHADS score of 1.  d. MAXIM in preparation for cardioversion, 09/09/2013, Dr. Huynh: EF in the lower limits of normal with a large amount of spontaneous contrast and the appearance of loosely formed thrombus in the left atrial appendage. Eliquis initiated.  e. Propafenone discontinued November 2013 secondary to ineffectiveness.   f. Echo, 03/07/2018: EF 50-55%. Trace-to-mild MR. Mild TR.  g. CT Angio of chest, 03/04/2018: no evidence of pulmonary embolus.  h. Holter, 08/21/2018: Bradycardia with long pauses.  2. Bradycardia:  a. Recorded in 2008 with a history of intolerance  to AV mele blocking agents.  b. Insertion of [employer] DDDR Pacemaker, 09/10/2018: PM 2272  SN [id number].  3. Dyslipidemia, on statin therapy.  4. Abdominal pain prompting admission to [facility], 09/06/2013:  a.  CT scan of the abdomen and pelvis showing no acute abnormality, 09/06/2013.  5. Mesenteric calcified mass:  a. Recent CT scan, June 2010,  by Dr. Aguilera, unchanged from prior.  6. Solitary pulmonary nodule:  a. X-ray chest pa and lateral, 9/6/2016: Chronic change; no active disease.  7. GERD.  8. Surgical history:  a. Hernia surgery, 10/20/1997.  b. 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 Diaz.    No Known Allergies    Current Medications:      Current Outpatient Medications:   •  aspirin 81 MG EC tablet, Take 81 mg by mouth Every Night., Disp: , Rfl:   •  B Complex Vitamins (VITAMIN B COMPLEX) capsule capsule, Take 1 capsule by mouth Daily., Disp: , Rfl:    •  Calcium-Magnesium-Vitamin D - MG-MG-UNIT tablet sustained-release 24 hour, Take 1 tablet by mouth Daily., Disp: , Rfl:   •  Coenzyme Q10 (COQ10) 400 MG capsule, Take 400 mg by mouth Daily., Disp: , Rfl:   •  colestipol (COLESTID) 1 G tablet, Take 2 g by mouth Daily., Disp: , Rfl:   •  dofetilide (TIKOSYN) 250 MCG capsule, 2 capsules po every morning and 1 po every evening, Disp: 90 capsule, Rfl: 0  •  ELIQUIS 5 MG tablet tablet, Take 1 tablet by mouth Every 12 (Twelve) Hours., Disp: 180 tablet, Rfl: 3  •  levothyroxine (SYNTHROID, LEVOTHROID) 50 MCG tablet, Take 50 mcg by mouth Daily., Disp: , Rfl:   •  Magnesium Oxide 400 (240 Mg) MG tablet, Take 1 tablet by mouth Daily., Disp: 90 tablet, Rfl: 3  •  metoprolol tartrate (LOPRESSOR) 25 MG tablet, Take 25 mg by mouth 2 (Two) Times a Day., Disp: , Rfl:   •  omeprazole (PriLOSEC) 40 MG capsule, Take 40 mg by mouth Daily., Disp: , Rfl:   •  pramipexole (MIRAPEX) 0.25 MG tablet, Take 0.25 mg by mouth Every Night., Disp: , Rfl:   •  rosuvastatin (CRESTOR) 20 MG tablet, Take 1 tablet by mouth Daily., Disp: 90 tablet, Rfl: 1  •  silodosin (RAPAFLO) 8 MG capsule capsule, Take 8 mg by mouth Daily With Breakfast., Disp: , Rfl:   •  triamcinolone (KENALOG) 0.1 % cream, Apply  topically to the appropriate area as directed 2 (Two) Times a Day., Disp: , Rfl:   •  vitamin A 8000 UNIT capsule, Take 8,000 Units by mouth Daily., Disp: , Rfl:   •  Zinc 50 MG capsule, Take 50 mg by mouth Daily., Disp: , Rfl:     HPI    Sly Miranda is a 83 y.o. male who presents today for 2 month follow up of paroxysmal atrial fibrillation, bradycardia s/p PPM, and hyperlipidemia. Since last visit, he has continued to have breakthrough episodes of intermittent atrial fibrillation despite increasing his dose of Tikosyn to 500 µg by morning with 250 µg qhs.  He states that there is random episodes and he cannot pinpoint any obvious trigger.  He feels like he notices them more on the  "days after he has had a busy day.  He denies having any chest pain, shortness of breath, dyspnea on exertion, edema, dizziness and syncope.  Intestine to make him a little more fatigued than usual.  His wife has diligently Done with his blood pressures and heart rates.  There does seem to be an increase in his blood pressure when he is feeling more tired with correlated elevated heart rates in the 80s.  Like to increase his Tikosyn up to 500 µg twice daily and have him repeat an EKG with a BMP on Monday, January 21.  We will leave his metoprolol his current dose as his blood pressure would not tolerate an increase. Device interrogation noted below.    The following portions of the patient's history were reviewed and updated as appropriate: allergies, current medications and problem list.    Pertinent positives as listed in the HPI.  All other systems reviewed are negative.    Vitals:    01/17/19 1334   BP: 112/68   BP Location: Right arm   Patient Position: Sitting   Pulse: 71   Weight: 77.1 kg (170 lb)   Height: 170.2 cm (67\")       Physical Exam:    GENERAL: well-developed, well-nourished; in no acute distress.   NECK:  Carotid upstrokes are 2+ and  symmetrical without bruits.   LUNGS: Clear to auscultation bilaterally without wheezing, rhonchi, or rales noted.   CARDIOVASCULAR: The heart has a regular rate with a normal S1 and S2. There is no murmur, gallop, rub, or click appreciated. The PMI is nondisplaced.   NEUROLOGICAL: Nonfocal; Alert and oriented  PERIPHERAL VASCULAR:  Posterior tibial pulses are 2+ and symmetrical. There is no peripheral edema.   SKIN:  Warm and dry  PSYCHIATRIC: normal mood and affect; behavior appropriate    Diagnostic Data:  Lab Results   Component Value Date    GLUCOSE 95 09/11/2018    BUN 19 09/11/2018    CREATININE 1.06 09/11/2018    EGFRIFNONA 67 09/11/2018    BCR 17.9 09/11/2018     09/11/2018    K 4.0 09/11/2018     09/11/2018    CO2 26.0 09/11/2018    CALCIUM 8.4 (L) " 09/11/2018    ALBUMIN 4.35 09/07/2018    LABIL2 1.4 03/28/2016    AST 34 (H) 09/07/2018    ALT 44 (H) 09/07/2018     Lab Results   Component Value Date    WBC 9.66 09/07/2018    HGB 15.9 09/07/2018    HCT 46.8 09/07/2018    MCV 84.5 09/07/2018     09/07/2018     Lab Results   Component Value Date    TSH 3.342 08/26/2018     Last lipid panel, 02/06/2017:  Chol, 128  Trig 145  HDL 48  LDL 53    DEVICE INTERROGATION:  1/17/2019, [employer] PPM Assurity MRI 2272: RA pacing 48%, RV pacing 20%. P wave is 2.3 mV with a threshold of 0.5 V at 0.5 msec and an impedance of 390 ohms. R wave is 11.5 mV with a threshold of 0.75 V at 0.5 msec and an impedance of 730 ohms. Battery voltage is 8.5-9.8 years longevity.  Underlying rhythm was sinus bradycardia 40s.  He 30% mode switching.  Longest duration was 15 hours on December 6 of 2018.      ECG 12 Lead  Date/Time: 1/17/2019 2:48 PM  Performed by: Brandy Capellan APRN  Authorized by: Brandy Capellan APRN   Comparison: compared with previous ECG   Similar to previous ECG  BPM: 69  Clinical impression: abnormal ECG  Comments: Electronic atrial pacemaker  Left anterior fascicular block  Possible anterior MI, age undetermined  QT/QTC of 415/435 ms        Assessment:      ICD-10-CM ICD-9-CM   1. Paroxysmal atrial fibrillation (CMS/[facility]) I48.0 427.31   2. Bradycardia R00.1 427.89   3. Hyperlipidemia LDL goal <100 E78.5 272.4       Plan:    1. Increase Tikosyn to 500mcgs BID with EKG on Monday 1/21/19  2. BMP and Mag level on 1/21/19  3. Continue Eliquis for anticoagulation  4. Continue metoprolol at current dose  5. Continue current medications.  6. F/up in 6 months with STJ or sooner if needed.    Seen independently by APRYL Rogers on  1/17/2019  2:03 PM

## 2019-01-18 RX ORDER — DOFETILIDE 0.5 MG/1
500 CAPSULE ORAL 2 TIMES DAILY
Qty: 180 CAPSULE | Refills: 3 | Status: SHIPPED | OUTPATIENT
Start: 2019-01-18 | End: 2020-02-18 | Stop reason: SDUPTHER

## 2019-01-21 ENCOUNTER — HOSPITAL ENCOUNTER (OUTPATIENT)
Facility: HOSPITAL | Age: 84
Discharge: HOME | End: 2019-01-21
Payer: MEDICARE

## 2019-01-21 ENCOUNTER — OUTSIDE FACILITY SERVICE (OUTPATIENT)
Dept: CARDIOLOGY | Facility: CLINIC | Age: 84
End: 2019-01-21

## 2019-01-21 LAB — MAGNESIUM: 1.9 MG/DL (ref 1.7–2.4)

## 2019-01-21 PROCEDURE — 36415 COLL VENOUS BLD VENIPUNCTURE: CPT

## 2019-01-21 PROCEDURE — 93000 ELECTROCARDIOGRAM COMPLETE: CPT | Performed by: INTERNAL MEDICINE

## 2019-01-21 PROCEDURE — 93005 ELECTROCARDIOGRAM TRACING: CPT

## 2019-01-21 PROCEDURE — 83735 ASSAY OF MAGNESIUM: CPT

## 2019-02-28 ENCOUNTER — CLINICAL SUPPORT NO REQUIREMENTS (OUTPATIENT)
Dept: CARDIOLOGY | Facility: CLINIC | Age: 84
End: 2019-02-28

## 2019-02-28 DIAGNOSIS — R00.1 BRADYCARDIA: ICD-10-CM

## 2019-02-28 DIAGNOSIS — I48.92 ATRIAL FLUTTER, PAROXYSMAL (HCC): ICD-10-CM

## 2019-02-28 PROCEDURE — 93296 REM INTERROG EVL PM/IDS: CPT | Performed by: INTERNAL MEDICINE

## 2019-02-28 PROCEDURE — 93294 REM INTERROG EVL PM/LDLS PM: CPT | Performed by: INTERNAL MEDICINE

## 2019-05-13 ENCOUNTER — OFFICE VISIT (OUTPATIENT)
Dept: SURGERY | Facility: CLINIC | Age: 84
End: 2019-05-13

## 2019-05-13 ENCOUNTER — APPOINTMENT (OUTPATIENT)
Dept: PREADMISSION TESTING | Facility: HOSPITAL | Age: 84
End: 2019-05-13

## 2019-05-13 VITALS
DIASTOLIC BLOOD PRESSURE: 66 MMHG | HEART RATE: 78 BPM | SYSTOLIC BLOOD PRESSURE: 102 MMHG | HEIGHT: 67 IN | BODY MASS INDEX: 26.68 KG/M2 | TEMPERATURE: 97.6 F | OXYGEN SATURATION: 98 % | RESPIRATION RATE: 18 BRPM | WEIGHT: 170 LBS

## 2019-05-13 VITALS — BODY MASS INDEX: 26.76 KG/M2 | WEIGHT: 170.5 LBS | HEIGHT: 67 IN

## 2019-05-13 DIAGNOSIS — K43.9 VENTRAL HERNIA WITHOUT OBSTRUCTION OR GANGRENE: ICD-10-CM

## 2019-05-13 DIAGNOSIS — C43.30 MELANOMA OF FACE (HCC): Primary | ICD-10-CM

## 2019-05-13 PROCEDURE — 99204 OFFICE O/P NEW MOD 45 MIN: CPT | Performed by: SURGERY

## 2019-05-13 NOTE — PROGRESS NOTES
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   • Mass     left abdominal wall.       SUBJECTIVE:    History of present illness:  Pt is here for evaluation of a skin lesion which has been present for several months.  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  Pt added that he had partial knee replacement done in the remote past and he usually takes Keflex Rx before chandra surgical procedure.  Ultrasound today indicated no abnormal cervical lymph nodes on the right side.  No history of other skin cancer or malignancy.  Patient denies pain left lower quadrant area of the mass.  No change in bowel habits.    The following portions of the patient's history were reviewed and updated as appropriate: allergies, current medications, past family history, past medical history, past social history, past surgical history and problem list.       Review of Systems   Constitutional: Negative for chills, fever and unexpected weight change.   HENT: Negative for trouble swallowing and voice change.    Eyes: Negative for visual disturbance.   Respiratory: Negative for apnea, cough, chest tightness, shortness of breath and wheezing.    Cardiovascular: Negative for chest pain, palpitations and leg swelling.   Gastrointestinal: Negative for abdominal distention, abdominal pain, anal bleeding, blood in stool, constipation, diarrhea, nausea, rectal pain and vomiting.   Endocrine: Negative for cold intolerance and heat intolerance.   Genitourinary: Negative for difficulty urinating, dysuria, flank pain, scrotal swelling and " testicular pain.   Musculoskeletal: Negative for back pain, gait problem and joint swelling.   Skin: Positive for color change. Negative for rash and wound.   Neurological: Negative for dizziness, syncope, speech difficulty, weakness, numbness and headaches.   Hematological: Negative for adenopathy. Does not bruise/bleed easily.   Psychiatric/Behavioral: Negative for confusion. The patient is not nervous/anxious.        Allergies:  No Known Allergies    Medications:    Current Outpatient Medications:   •  aspirin 81 MG EC tablet, Take 81 mg by mouth Every Night., Disp: , Rfl:   •  B Complex Vitamins (VITAMIN B COMPLEX) capsule capsule, Take 1 capsule by mouth Daily., Disp: , Rfl:   •  Calcium-Magnesium-Vitamin D - MG-MG-UNIT tablet sustained-release 24 hour, Take 1 tablet by mouth Daily., Disp: , Rfl:   •  Cholecalciferol (D 1000) 1000 units capsule, Take 1,000 Units by mouth., Disp: , Rfl:   •  Coenzyme Q10 (COQ10) 400 MG capsule, Take 400 mg by mouth Daily., Disp: , Rfl:   •  colestipol (COLESTID) 1 G tablet, Take 2 g by mouth Daily., Disp: , Rfl:   •  dofetilide (TIKOSYN) 500 MCG capsule, Take 1 capsule by mouth 2 (Two) Times a Day., Disp: 180 capsule, Rfl: 3  •  ELIQUIS 5 MG tablet tablet, Take 1 tablet by mouth Every 12 (Twelve) Hours., Disp: 180 tablet, Rfl: 3  •  levothyroxine (SYNTHROID, LEVOTHROID) 50 MCG tablet, Take 50 mcg by mouth Daily., Disp: , Rfl:   •  Magnesium Oxide 400 (240 Mg) MG tablet, Take 1 tablet by mouth Daily., Disp: 90 tablet, Rfl: 3  •  metoprolol tartrate (LOPRESSOR) 25 MG tablet, Take 25 mg by mouth 2 (Two) Times a Day., Disp: , Rfl:   •  omeprazole (PriLOSEC) 40 MG capsule, Take 40 mg by mouth Daily., Disp: , Rfl:   •  pramipexole (MIRAPEX) 0.25 MG tablet, Take 0.25 mg by mouth Every Night., Disp: , Rfl:   •  rosuvastatin (CRESTOR) 20 MG tablet, Take 1 tablet by mouth Daily., Disp: 90 tablet, Rfl: 1  •  silodosin (RAPAFLO) 8 MG capsule capsule, Take 8 mg by mouth Daily  "With Breakfast., Disp: , Rfl:   •  triamcinolone (KENALOG) 0.1 % cream, Apply  topically to the appropriate area as directed 2 (Two) Times a Day., Disp: , Rfl:   •  Zinc 50 MG capsule, Take 50 mg by mouth Daily., Disp: , Rfl:     History:  Past Medical History:   Diagnosis Date   • Bradycardia     Recorded in 2008 with a history of intolerance to AV mele blocking agents.   • Calcified mesenteric mass     Recent CT scan, June 2010, by Dr. Aguilera, unchanged from prior.   • Chronic atrial fibrillation (CMS/HCC)     on Eliquis 5 mg b.i.d.    • Dyslipidemia     , followed by Dr. Benavidez. on statin therapy.   • GERD (gastroesophageal reflux disease)        Past Surgical History:   Procedure Laterality Date   • CARDIAC ELECTROPHYSIOLOGY PROCEDURE N/A 9/10/2018    Procedure: Pacemaker DC new;  Surgeon: Donnell Pineda MD;  Location: [facility] INVASIVE LOCATION;  Service: Cardiology   • HERNIA REPAIR  10/20/1997   • KNEE SURGERY Left    • TOTAL KNEE ARTHROPLASTY Right 03/06/2014    Right partial total knee replacement       Family History   Problem Relation Age of Onset   • Arrhythmia Mother    • Stroke Father    • Heart attack Maternal Grandmother        Social History     Tobacco Use   • Smoking status: Former Smoker     Types: Cigars   • Smokeless tobacco: Never Used   • Tobacco comment: quit over 40 years ago   Substance Use Topics   • Alcohol use: No   • Drug use: No        OBJECTIVE:    Vital Signs:   Vitals:    05/13/19 1500   BP: 102/66   Pulse: 78   Resp: 18   Temp: 97.6 °F (36.4 °C)   TempSrc: Temporal   SpO2: 98%   Weight: 77.1 kg (170 lb)   Height: 170.2 cm (67\")       Physical Exam:   General Appearance:    Alert, cooperative, in no acute distress   Head:    Normocephalic, without obvious abnormality, atraumatic   Eyes:            Lids and lashes normal, conjunctivae and sclerae normal, no   icterus, no pallor, corneas clear, PERRLA   Ears:    Ears appear intact with no abnormalities noted   Throat:   No " oral lesions, no thrush, oral mucosa moist   Neck:   No adenopathy, supple, trachea midline, no thyromegaly, no   carotid bruit, no JVD   Lungs:     Clear to auscultation,respirations regular, even and                  unlabored    Heart:    Regular rhythm and normal rate, normal S1 and S2, no            murmur, no gallop, no rub, no click   Chest Wall:    No abnormalities observed   Abdomen:     Normal bowel sounds, no masses, no organomegaly, soft        non-tender, non-distended, no guarding, no rebound                tenderness   Extremities:   Moves all extremities well, no edema, no cyanosis, no             redness   Pulses:   Pulses palpable and equal bilaterally   Skin:   No bleeding, bruising or rash   Lymph nodes:   No palpable adenopathy   Neurologic:   Cranial nerves 2 - 12 grossly intact, sensation intact, DTR       present and equal bilaterally     Results Review:   I reviewed the patient's new clinical results.    ASSESSMENT/PLAN:    1. Melanoma of face (CMS/HCC)    2. Ventral hernia without obstruction or gangrene        Patient scheduled for wide excision of melanoma on the right face with layered closure.  Risk of bleeding infection and recurrence discussed and patient agreeable.  Patient reassured that he has a ventral hernia in the left upper quadrant, no involvement with bowel and asymptomatic.  Close observation for now.    I discussed the patients findings and my recommendations with patient    Review of Systems was reviewed and confirmed as accurate today.    Electronically signed by Dawson Charlton MD  05/13/19      .      Portions of this note have been scribed for Dawson Charlton MD by Rebeca Bautista. 5/13/2019  3:50 PM

## 2019-05-13 NOTE — H&P (VIEW-ONLY)
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   • Mass     left abdominal wall.       SUBJECTIVE:    History of present illness:  Pt is here for evaluation of a skin lesion which has been present for several months.  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  Pt added that he had partial knee replacement done in the remote past and he usually takes Keflex Rx before chandra surgical procedure.  Ultrasound today indicated no abnormal cervical lymph nodes on the right side.  No history of other skin cancer or malignancy.  Patient denies pain left lower quadrant area of the mass.  No change in bowel habits.    The following portions of the patient's history were reviewed and updated as appropriate: allergies, current medications, past family history, past medical history, past social history, past surgical history and problem list.       Review of Systems   Constitutional: Negative for chills, fever and unexpected weight change.   HENT: Negative for trouble swallowing and voice change.    Eyes: Negative for visual disturbance.   Respiratory: Negative for apnea, cough, chest tightness, shortness of breath and wheezing.    Cardiovascular: Negative for chest pain, palpitations and leg swelling.   Gastrointestinal: Negative for abdominal distention, abdominal pain, anal bleeding, blood in stool, constipation, diarrhea, nausea, rectal pain and vomiting.   Endocrine: Negative for cold intolerance and heat intolerance.   Genitourinary: Negative for difficulty urinating, dysuria, flank pain, scrotal swelling and " testicular pain.   Musculoskeletal: Negative for back pain, gait problem and joint swelling.   Skin: Positive for color change. Negative for rash and wound.   Neurological: Negative for dizziness, syncope, speech difficulty, weakness, numbness and headaches.   Hematological: Negative for adenopathy. Does not bruise/bleed easily.   Psychiatric/Behavioral: Negative for confusion. The patient is not nervous/anxious.        Allergies:  No Known Allergies    Medications:    Current Outpatient Medications:   •  aspirin 81 MG EC tablet, Take 81 mg by mouth Every Night., Disp: , Rfl:   •  B Complex Vitamins (VITAMIN B COMPLEX) capsule capsule, Take 1 capsule by mouth Daily., Disp: , Rfl:   •  Calcium-Magnesium-Vitamin D - MG-MG-UNIT tablet sustained-release 24 hour, Take 1 tablet by mouth Daily., Disp: , Rfl:   •  Cholecalciferol (D 1000) 1000 units capsule, Take 1,000 Units by mouth., Disp: , Rfl:   •  Coenzyme Q10 (COQ10) 400 MG capsule, Take 400 mg by mouth Daily., Disp: , Rfl:   •  colestipol (COLESTID) 1 G tablet, Take 2 g by mouth Daily., Disp: , Rfl:   •  dofetilide (TIKOSYN) 500 MCG capsule, Take 1 capsule by mouth 2 (Two) Times a Day., Disp: 180 capsule, Rfl: 3  •  ELIQUIS 5 MG tablet tablet, Take 1 tablet by mouth Every 12 (Twelve) Hours., Disp: 180 tablet, Rfl: 3  •  levothyroxine (SYNTHROID, LEVOTHROID) 50 MCG tablet, Take 50 mcg by mouth Daily., Disp: , Rfl:   •  Magnesium Oxide 400 (240 Mg) MG tablet, Take 1 tablet by mouth Daily., Disp: 90 tablet, Rfl: 3  •  metoprolol tartrate (LOPRESSOR) 25 MG tablet, Take 25 mg by mouth 2 (Two) Times a Day., Disp: , Rfl:   •  omeprazole (PriLOSEC) 40 MG capsule, Take 40 mg by mouth Daily., Disp: , Rfl:   •  pramipexole (MIRAPEX) 0.25 MG tablet, Take 0.25 mg by mouth Every Night., Disp: , Rfl:   •  rosuvastatin (CRESTOR) 20 MG tablet, Take 1 tablet by mouth Daily., Disp: 90 tablet, Rfl: 1  •  silodosin (RAPAFLO) 8 MG capsule capsule, Take 8 mg by mouth Daily  "With Breakfast., Disp: , Rfl:   •  triamcinolone (KENALOG) 0.1 % cream, Apply  topically to the appropriate area as directed 2 (Two) Times a Day., Disp: , Rfl:   •  Zinc 50 MG capsule, Take 50 mg by mouth Daily., Disp: , Rfl:     History:  Past Medical History:   Diagnosis Date   • Bradycardia     Recorded in 2008 with a history of intolerance to AV mele blocking agents.   • Calcified mesenteric mass     Recent CT scan, June 2010, by Dr. Aguilera, unchanged from prior.   • Chronic atrial fibrillation (CMS/HCC)     on Eliquis 5 mg b.i.d.    • Dyslipidemia     , followed by Dr. Benavidez. on statin therapy.   • GERD (gastroesophageal reflux disease)        Past Surgical History:   Procedure Laterality Date   • CARDIAC ELECTROPHYSIOLOGY PROCEDURE N/A 9/10/2018    Procedure: Pacemaker DC new;  Surgeon: Donnell Pineda MD;  Location: [facility] INVASIVE LOCATION;  Service: Cardiology   • HERNIA REPAIR  10/20/1997   • KNEE SURGERY Left    • TOTAL KNEE ARTHROPLASTY Right 03/06/2014    Right partial total knee replacement       Family History   Problem Relation Age of Onset   • Arrhythmia Mother    • Stroke Father    • Heart attack Maternal Grandmother        Social History     Tobacco Use   • Smoking status: Former Smoker     Types: Cigars   • Smokeless tobacco: Never Used   • Tobacco comment: quit over 40 years ago   Substance Use Topics   • Alcohol use: No   • Drug use: No        OBJECTIVE:    Vital Signs:   Vitals:    05/13/19 1500   BP: 102/66   Pulse: 78   Resp: 18   Temp: 97.6 °F (36.4 °C)   TempSrc: Temporal   SpO2: 98%   Weight: 77.1 kg (170 lb)   Height: 170.2 cm (67\")       Physical Exam:   General Appearance:    Alert, cooperative, in no acute distress   Head:    Normocephalic, without obvious abnormality, atraumatic   Eyes:            Lids and lashes normal, conjunctivae and sclerae normal, no   icterus, no pallor, corneas clear, PERRLA   Ears:    Ears appear intact with no abnormalities noted   Throat:   No " oral lesions, no thrush, oral mucosa moist   Neck:   No adenopathy, supple, trachea midline, no thyromegaly, no   carotid bruit, no JVD   Lungs:     Clear to auscultation,respirations regular, even and                  unlabored    Heart:    Regular rhythm and normal rate, normal S1 and S2, no            murmur, no gallop, no rub, no click   Chest Wall:    No abnormalities observed   Abdomen:     Normal bowel sounds, no masses, no organomegaly, soft        non-tender, non-distended, no guarding, no rebound                tenderness   Extremities:   Moves all extremities well, no edema, no cyanosis, no             redness   Pulses:   Pulses palpable and equal bilaterally   Skin:   No bleeding, bruising or rash   Lymph nodes:   No palpable adenopathy   Neurologic:   Cranial nerves 2 - 12 grossly intact, sensation intact, DTR       present and equal bilaterally     Results Review:   I reviewed the patient's new clinical results.    ASSESSMENT/PLAN:    1. Melanoma of face (CMS/HCC)    2. Ventral hernia without obstruction or gangrene        Patient scheduled for wide excision of melanoma on the right face with layered closure.  Risk of bleeding infection and recurrence discussed and patient agreeable.  Patient reassured that he has a ventral hernia in the left upper quadrant, no involvement with bowel and asymptomatic.  Close observation for now.    I discussed the patients findings and my recommendations with patient    Review of Systems was reviewed and confirmed as accurate today.    Electronically signed by Dawson Charlton MD  05/13/19      .      Portions of this note have been scribed for Dawson Charlton MD by Rebeca Bautista. 5/13/2019  3:50 PM

## 2019-05-17 ENCOUNTER — TELEPHONE (OUTPATIENT)
Dept: SURGERY | Facility: CLINIC | Age: 84
End: 2019-05-17

## 2019-05-17 NOTE — TELEPHONE ENCOUNTER
Left msg for pt to confirm procedure s    cheduled @ [facility] on 05/20/19      Wife called back and confirmed procedure

## 2019-05-20 ENCOUNTER — ANESTHESIA EVENT (OUTPATIENT)
Dept: PERIOP | Facility: HOSPITAL | Age: 84
End: 2019-05-20

## 2019-05-20 ENCOUNTER — HOSPITAL ENCOUNTER (OUTPATIENT)
Facility: HOSPITAL | Age: 84
Setting detail: HOSPITAL OUTPATIENT SURGERY
Discharge: HOME | End: 2019-05-20
Attending: SURGERY | Admitting: SURGERY

## 2019-05-20 ENCOUNTER — ANESTHESIA (OUTPATIENT)
Dept: PERIOP | Facility: HOSPITAL | Age: 84
End: 2019-05-20

## 2019-05-20 VITALS
HEART RATE: 69 BPM | OXYGEN SATURATION: 95 % | DIASTOLIC BLOOD PRESSURE: 69 MMHG | RESPIRATION RATE: 16 BRPM | SYSTOLIC BLOOD PRESSURE: 110 MMHG | TEMPERATURE: 97 F

## 2019-05-20 DIAGNOSIS — C43.30 MELANOMA OF FACE (HCC): ICD-10-CM

## 2019-05-20 PROCEDURE — 12052 INTMD RPR FACE/MM 2.6-5.0 CM: CPT | Performed by: SURGERY

## 2019-05-20 PROCEDURE — 88305 TISSUE EXAM BY PATHOLOGIST: CPT | Performed by: SURGERY

## 2019-05-20 PROCEDURE — 11646 EXC F/E/E/N/L MAL+MRG >4 CM: CPT | Performed by: SURGERY

## 2019-05-20 PROCEDURE — 25010000002 PROPOFOL 1000 MG/ML EMULSION: Performed by: NURSE ANESTHETIST, CERTIFIED REGISTERED

## 2019-05-20 PROCEDURE — 88342 IMHCHEM/IMCYTCHM 1ST ANTB: CPT | Performed by: SURGERY

## 2019-05-20 PROCEDURE — 25010000002 ONDANSETRON PER 1 MG: Performed by: NURSE ANESTHETIST, CERTIFIED REGISTERED

## 2019-05-20 PROCEDURE — 25010000003 CEFAZOLIN SODIUM-DEXTROSE 2-3 GM-%(50ML) RECONSTITUTED SOLUTION: Performed by: SURGERY

## 2019-05-20 PROCEDURE — 25010000002 FENTANYL CITRATE (PF) 100 MCG/2ML SOLUTION: Performed by: NURSE ANESTHETIST, CERTIFIED REGISTERED

## 2019-05-20 PROCEDURE — 88341 IMHCHEM/IMCYTCHM EA ADD ANTB: CPT | Performed by: SURGERY

## 2019-05-20 RX ORDER — SODIUM CHLORIDE, SODIUM LACTATE, POTASSIUM CHLORIDE, CALCIUM CHLORIDE 600; 310; 30; 20 MG/100ML; MG/100ML; MG/100ML; MG/100ML
1000 INJECTION, SOLUTION INTRAVENOUS CONTINUOUS
Status: DISCONTINUED | OUTPATIENT
Start: 2019-05-20 | End: 2019-05-20 | Stop reason: HOSPADM

## 2019-05-20 RX ORDER — CEFAZOLIN SODIUM 2 G/50ML
2 SOLUTION INTRAVENOUS ONCE
Status: COMPLETED | OUTPATIENT
Start: 2019-05-20 | End: 2019-05-20

## 2019-05-20 RX ORDER — FENTANYL CITRATE 50 UG/ML
INJECTION, SOLUTION INTRAMUSCULAR; INTRAVENOUS AS NEEDED
Status: DISCONTINUED | OUTPATIENT
Start: 2019-05-20 | End: 2019-05-20 | Stop reason: SURG

## 2019-05-20 RX ORDER — LIDOCAINE HYDROCHLORIDE 10 MG/ML
INJECTION, SOLUTION INFILTRATION; PERINEURAL AS NEEDED
Status: DISCONTINUED | OUTPATIENT
Start: 2019-05-20 | End: 2019-05-20 | Stop reason: HOSPADM

## 2019-05-20 RX ORDER — MAGNESIUM HYDROXIDE 1200 MG/15ML
LIQUID ORAL AS NEEDED
Status: DISCONTINUED | OUTPATIENT
Start: 2019-05-20 | End: 2019-05-20 | Stop reason: HOSPADM

## 2019-05-20 RX ORDER — ONDANSETRON 2 MG/ML
INJECTION INTRAMUSCULAR; INTRAVENOUS AS NEEDED
Status: DISCONTINUED | OUTPATIENT
Start: 2019-05-20 | End: 2019-05-20 | Stop reason: SURG

## 2019-05-20 RX ORDER — LIDOCAINE HYDROCHLORIDE 20 MG/ML
INJECTION, SOLUTION INTRAVENOUS AS NEEDED
Status: DISCONTINUED | OUTPATIENT
Start: 2019-05-20 | End: 2019-05-20 | Stop reason: SURG

## 2019-05-20 RX ADMIN — FENTANYL CITRATE 25 MCG: 50 INJECTION, SOLUTION INTRAMUSCULAR; INTRAVENOUS at 08:34

## 2019-05-20 RX ADMIN — CEFAZOLIN SODIUM 2 G: 2 SOLUTION INTRAVENOUS at 08:36

## 2019-05-20 RX ADMIN — LIDOCAINE HYDROCHLORIDE 60 MG: 20 INJECTION, SOLUTION INTRAVENOUS at 08:34

## 2019-05-20 RX ADMIN — FENTANYL CITRATE 25 MCG: 50 INJECTION, SOLUTION INTRAMUSCULAR; INTRAVENOUS at 08:39

## 2019-05-20 RX ADMIN — PROPOFOL 75 MCG/KG/MIN: 10 INJECTION, EMULSION INTRAVENOUS at 08:34

## 2019-05-20 RX ADMIN — ONDANSETRON 4 MG: 2 INJECTION INTRAMUSCULAR; INTRAVENOUS at 08:55

## 2019-05-20 RX ADMIN — SODIUM CHLORIDE, POTASSIUM CHLORIDE, SODIUM LACTATE AND CALCIUM CHLORIDE 1000 ML: 600; 310; 30; 20 INJECTION, SOLUTION INTRAVENOUS at 07:16

## 2019-05-20 NOTE — ANESTHESIA POSTPROCEDURE EVALUATION
Patient: Sly Miranda    Procedure Summary     Date:  05/20/19 Room / Location:  [facility] OR  /  [facility]    Anesthesia Start:  0829 Anesthesia Stop:  0902    Procedure:  WIDE EXCISION MELANOMA RIGHT FACE (Right ) Diagnosis:       Melanoma of face (CMS/HCC)      (Melanoma of face (CMS/HCC) [C43.30])    Surgeon:  Dawson Charlton MD Provider:  Renetta Garcia CRNA    Anesthesia Type:  MAC ASA Status:  3          Anesthesia Type: MAC  Last vitals  BP   90/63 (05/20/19 0902)   Temp   97 °F (36.1 °C) (05/20/19 0902)   Pulse   72 (05/20/19 0902)   Resp   16 (05/20/19 0902)     SpO2   94 % (05/20/19 0902)     Post Anesthesia Care and Evaluation    Patient location during evaluation: PHASE II  Patient participation: complete - patient participated  Level of consciousness: awake and alert  Pain score: 0  Pain management: satisfactory to patient  Airway patency: patent  Anesthetic complications: No anesthetic complications  PONV Status: none  Cardiovascular status: acceptable and stable  Respiratory status: acceptable  Hydration status: acceptable

## 2019-05-20 NOTE — ANESTHESIA PREPROCEDURE EVALUATION
Anesthesia Evaluation     Patient summary reviewed and Nursing notes reviewed   no history of anesthetic complications:  NPO Solid Status: > 8 hours  NPO Liquid Status: > 8 hours           Airway   Mallampati: I  TM distance: >3 FB  Neck ROM: full  no difficulty expected  Dental - normal exam     Pulmonary - normal exam   (+) a smoker Former, shortness of breath, sleep apnea (Snores),   Cardiovascular - normal exam    PT is on anticoagulation therapy  Patient on routine beta blocker and Beta blocker given within 24 hours of surgery  Rhythm: regular  Rate: normal    (+) pacemaker pacemaker, hypertension, dysrhythmias Atrial Fib, hyperlipidemia,     ROS comment: Off Eliquis x 5 days    Neuro/Psych  (+) dizziness/light headedness, syncope,     GI/Hepatic/Renal/Endo    (+)  GERD,  hypothyroidism,     Musculoskeletal     Abdominal  - normal exam    Abdomen: soft.  Bowel sounds: normal.   Substance History      OB/GYN          Other      history of cancer (Skin) active                    Anesthesia Plan    ASA 3     MAC   (Risks and benefits discussed including risk of aspiration, recall and dental damage. All patient questions answered. Will continue with POC.)  intravenous induction   Anesthetic plan, all risks, benefits, and alternatives have been provided, discussed and informed consent has been obtained with: patient.

## 2019-05-20 NOTE — OP NOTE
PATIENT:    Sly Miranda    DATE OF SURGERY:  5/20/2019    PHYSICIAN:    Dawson Charlton MD    REFERRING PHYSICIAN:  Dawson Charlton MD    YOB: 1935    PREOPERATIVE DIAGNOSIS: Melanoma face    POSTOPERATIVE DIAGNOSIS: Same    PROCEDURE: Wide excision melanoma face, 6 x 4 cm area with 3 layer closure.    INDICATIONS:  The patient was sent to me as a consultation by Dawson Charlton MD for evaluation and treatment of a history significant for right facial melanoma, biopsy-proven. They are here now today for wide excision    ANESTHESIA: Sedation with local anesthesia     OPERATIVE PROCEDURE:  The patient was taken to the operating room, placed in the supine position, and given sedation with local anesthesia.  They were prepped and draped in the normal sterile fashion.  They did receive preoperative IV antibiotics.  The nursing staff did perform intraoperative timeout prior to the incision.    Right face prepped normally, 1% lidocaine injected.  Elliptical incision made around the area measuring 6 x 4 cm.  I then closed the wound in 3 layers including one layer of muscle.  Dressing applied, no complications.  EBL was 5 cc.    The patient was stable at this point in time and subsequently transferred back to the recovery room in stable condition.       Dawson Charlton MD  5/20/2019  9:02 AM

## 2019-05-24 ENCOUNTER — OFFICE VISIT (OUTPATIENT)
Dept: SURGERY | Facility: CLINIC | Age: 84
End: 2019-05-24

## 2019-05-24 VITALS
WEIGHT: 170.42 LBS | SYSTOLIC BLOOD PRESSURE: 114 MMHG | HEART RATE: 79 BPM | TEMPERATURE: 97.6 F | OXYGEN SATURATION: 99 % | DIASTOLIC BLOOD PRESSURE: 72 MMHG | BODY MASS INDEX: 26.75 KG/M2 | HEIGHT: 67 IN

## 2019-05-24 DIAGNOSIS — Z48.89 POSTOPERATIVE VISIT: Primary | ICD-10-CM

## 2019-05-24 LAB
LAB AP CASE REPORT: NORMAL
PATH REPORT.FINAL DX SPEC: NORMAL

## 2019-05-24 PROCEDURE — 99024 POSTOP FOLLOW-UP VISIT: CPT | Performed by: SURGERY

## 2019-05-24 NOTE — PROGRESS NOTES
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  Margins are negative..  They state that they have done well and are having no problems.    The following portions of the patient's history were reviewed and updated as appropriate: allergies, current medications, past family history, past medical history, past social history, past surgical history and problem list.      Vital Signs:  Vitals:    05/24/19 1436   BP: 114/72   Pulse: 79   Temp: 97.6 °F (36.4 °C)   TempSrc: Temporal   SpO2: 99%   Weight: 77.3 kg (170 lb 6.7 oz)   Height: 170.2 cm (67.01\")       Physical Exam:   General Appearance:    Alert, cooperative, in no acute distress, wound clean dry without infection   Abdomen:     no masses, no organomegaly, soft non-tender, non-distended, no guarding, wounds are well healed   Chest:      Clear to ausculation       Assessment / Plan:    1. Postoperative visit        I did discuss the situation with the patient today in the office and they have done well from their recent lesion excision, I don't think that the patient needs any further intervention and I need to see them back only if they have further problems. Pathology report was reviewed with the patient in the office.    Electronically signed by Dawson Charlton MD  05/24/19    Portions of this note have been scribed for Dawson Charlton MD by Michelle Milligan. 5/24/2019  2:46 PM                        "

## 2019-05-30 ENCOUNTER — CLINICAL SUPPORT NO REQUIREMENTS (OUTPATIENT)
Dept: CARDIOLOGY | Facility: CLINIC | Age: 84
End: 2019-05-30

## 2019-05-30 DIAGNOSIS — R55 SYNCOPE, UNSPECIFIED SYNCOPE TYPE: ICD-10-CM

## 2019-05-30 PROCEDURE — 93294 REM INTERROG EVL PM/LDLS PM: CPT | Performed by: INTERNAL MEDICINE

## 2019-05-30 PROCEDURE — 93296 REM INTERROG EVL PM/IDS: CPT | Performed by: INTERNAL MEDICINE

## 2019-08-15 ENCOUNTER — HOSPITAL ENCOUNTER (OUTPATIENT)
Facility: HOSPITAL | Age: 84
Discharge: HOME | End: 2019-08-15
Payer: MEDICARE

## 2019-08-15 ENCOUNTER — OFFICE VISIT (OUTPATIENT)
Dept: CARDIOLOGY | Facility: CLINIC | Age: 84
End: 2019-08-15

## 2019-08-15 VITALS
HEART RATE: 70 BPM | WEIGHT: 176 LBS | BODY MASS INDEX: 27.62 KG/M2 | DIASTOLIC BLOOD PRESSURE: 68 MMHG | SYSTOLIC BLOOD PRESSURE: 114 MMHG | HEIGHT: 67 IN

## 2019-08-15 DIAGNOSIS — I48.0 PAROXYSMAL ATRIAL FIBRILLATION (HCC): Primary | ICD-10-CM

## 2019-08-15 DIAGNOSIS — E78.5 HYPERLIPIDEMIA LDL GOAL <100: ICD-10-CM

## 2019-08-15 DIAGNOSIS — Z95.0 PACEMAKER: ICD-10-CM

## 2019-08-15 DIAGNOSIS — I49.5 TACHY-BRADY SYNDROME (HCC): ICD-10-CM

## 2019-08-15 PROCEDURE — 93280 PM DEVICE PROGR EVAL DUAL: CPT | Performed by: INTERNAL MEDICINE

## 2019-08-15 PROCEDURE — 93005 ELECTROCARDIOGRAM TRACING: CPT

## 2019-08-15 PROCEDURE — 99214 OFFICE O/P EST MOD 30 MIN: CPT | Performed by: INTERNAL MEDICINE

## 2019-08-15 NOTE — PROGRESS NOTES
Sly Miranda  1935  84 y.o.  [phone]  [phone]      Date: 08/15/2019    PCP: Ahmet Benavidez MD    Chief Complaint   Patient presents with   • Paroxysmal atrial fibrillation (CMS/HCC)       Problem List:  1. Paroxysmal atrial fibrillation:  a. MAXIM/ECV, 10/22/2013, Dr. Huynh: EF 55%. Trace TR, mild MR. Successful ECV to sinus rhythm. Eliquis initiated with propafenone 150 mg q.12 h.   b. EKG, 02/24/2013, revealing NSR at a rate of 64 bpm with a normal  QTC.   c. CHADS score of 1.  d. MAXIM in preparation for cardioversion, 09/09/2013, Dr. Huynh: EF in the lower limits of normal with a large amount of spontaneous contrast and the appearance of loosely formed thrombus in the left atrial appendage. Eliquis initiated.  e. Propafenone discontinued November 2013 secondary to ineffectiveness.   f. Echo, 03/07/2018: EF 50-55%. Trace-to-mild MR. Mild TR.  g. CT Angio of chest, 03/04/2018: no evidence of pulmonary embolus.  h. Holter, 08/21/2018: Bradycardia with long pauses.  2. Bradycardia:  a. Recorded in 2008 with a history of intolerance  to AV mele blocking agents.  b. Insertion of [employer] DDDR Pacemaker, 09/10/2018: PM 2272  SN [id number].  3. Dyslipidemia, on statin therapy.  4. Abdominal pain prompting admission to [facility], 09/06/2013:  a.  CT scan of the abdomen and pelvis showing no acute abnormality, 09/06/2013.  5. Mesenteric calcified mass:  a. Recent CT scan, June 2010,  by Dr. Aguilera, unchanged from prior.  6. Solitary pulmonary nodule:  a. X-ray chest pa and lateral, 9/6/2016: Chronic change; no active disease.  7. GERD.  8. Surgical history:  a. Hernia surgery, 10/20/1997.  b. 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 Diaz.    No Known Allergies    Current Medications:      Current Outpatient Medications:   •  aspirin 81 MG EC tablet, Take 81 mg by mouth Every Night., Disp: , Rfl:   •  B Complex Vitamins (VITAMIN B COMPLEX) capsule capsule, Take 1 capsule  by mouth Daily., Disp: , Rfl:   •  Calcium-Magnesium-Vitamin D - MG-MG-UNIT tablet sustained-release 24 hour, Take 1 tablet by mouth Daily., Disp: , Rfl:   •  Cholecalciferol (D 1000) 1000 units capsule, Take 1,000 Units by mouth., Disp: , Rfl:   •  Coenzyme Q10 (COQ10) 400 MG capsule, Take 400 mg by mouth Daily., Disp: , Rfl:   •  colestipol (COLESTID) 1 G tablet, Take 2 g by mouth Daily., Disp: , Rfl:   •  dofetilide (TIKOSYN) 500 MCG capsule, Take 1 capsule by mouth 2 (Two) Times a Day., Disp: 180 capsule, Rfl: 3  •  ELIQUIS 5 MG tablet tablet, Take 1 tablet by mouth Every 12 (Twelve) Hours., Disp: 180 tablet, Rfl: 3  •  levothyroxine (SYNTHROID, LEVOTHROID) 50 MCG tablet, Take 50 mcg by mouth Daily., Disp: , Rfl:   •  Magnesium Oxide 400 (240 Mg) MG tablet, Take 1 tablet by mouth Daily., Disp: 90 tablet, Rfl: 3  •  metoprolol tartrate (LOPRESSOR) 25 MG tablet, Take 25 mg by mouth 2 (Two) Times a Day., Disp: , Rfl:   •  omeprazole (PriLOSEC) 40 MG capsule, Take 40 mg by mouth Daily., Disp: , Rfl:   •  pramipexole (MIRAPEX) 0.25 MG tablet, Take 0.25 mg by mouth Every Night., Disp: , Rfl:   •  rosuvastatin (CRESTOR) 20 MG tablet, Take 1 tablet by mouth Daily., Disp: 90 tablet, Rfl: 1  •  silodosin (RAPAFLO) 8 MG capsule capsule, Take 8 mg by mouth Daily With Breakfast., Disp: , Rfl:   •  triamcinolone (KENALOG) 0.1 % cream, Apply  topically to the appropriate area as directed 2 (Two) Times a Day., Disp: , Rfl:   •  Zinc 50 MG capsule, Take 50 mg by mouth Daily., Disp: , Rfl:     HPI    Sly Miranda is a 84 y.o. male who presents today for 6 month follow up of PAF, bradycardia s/p PPM, and dyslipidemia. Since last visit, he has been feeling well overall from a cardiovascular standpoint. He has started taking B12 shots, which has helped with his energy levels. Patient reports occasional episodes of atrial fibrillation, but none in recent months. He does not have an exercise routine but does try to  "remain active with weed-eating and cutting grass on 2 acres. Patient denies chest pain, palpitations, shortness of breath, edema, dizziness, and syncope.     The following portions of the patient's history were reviewed and updated as appropriate: allergies, current medications and problem list.    Pertinent positives as listed in the HPI.  All other systems reviewed are negative.    Vitals:    08/15/19 1454   BP: 114/68   BP Location: Right arm   Patient Position: Sitting   Pulse: 70   Weight: 79.8 kg (176 lb)   Height: 170.2 cm (67\")       Physical Exam:    General: Alert and oriented.  Neck: Jugular venous pressure is within normal limits. Carotids have normal upstrokes without bruits.   Cardiovascular: Heart has a nondisplaced focal PMI. Regular rate and rhythm without murmur, gallop or rub.  Lungs: Clear without rales or wheezes. Equal expansion is noted.   Extremities: Show no edema.  Skin: Warm and dry.  Neurologic: Nonfocal.    Diagnostic Data:      ECG 12 Lead  Date/Time: 8/15/2019 3:13 PM  Performed by: Ana Huynh MD  Authorized by: Ana Huynh MD   Comparison: compared with previous ECG from 1/18/2019  Similar to previous ECG  Rhythm: paced  BPM: 69    Clinical impression: abnormal EKG  Comments: Left anterior fascicular block.  Electronic atrial pacemaker             MANUAL DEVICE INTERROGATION: 8/15/2019, [employer] pacemaker, DDDR 70/120:   RA pacing 94%, RV pacing 3.2%.  P wave is 2.3 mV with a threshold of 0.5 V at 0.5 msec and an impedance of 390 ohms.   R wave is > 12.0 mV with a threshold of 0.75 V at 0.5 msec and an impedance of 700 ohms.  Battery voltage is 8.3-9.3 years.  Events: 89 mode switches, longest 17 minutes, total burden < 1%.      Assessment:      ICD-10-CM ICD-9-CM   1. Paroxysmal atrial fibrillation (CMS/HCC) I48.0 427.31   2. Tachy-chloé syndrome (CMS/HCC) I49.5 427.81   3. Hyperlipidemia LDL goal <100 E78.5 272.4   4. Pacemaker Z95.0 V45.01     Lab results " found above were reviewed with the patient.    Plan:    1. Begin regular aerobic exercise at least 30 minutes, 4-5 days per week.   2. Continue Tikosyn for rhythm control with PAF. Continue Eliquis 5 mg for stroke prophylaxis.  3. Continue metoprolol for hypertension.  4. Continue rosuvastatin 20 mg for hyperlipidemia  5. Continue all other current medications.  6. F/up in 6 months with [employer] device check, or sooner if needed.    Scribed for Ana Huynh MD by Neela Stewart. 8/15/2019  3:16 PM

## 2019-10-22 ENCOUNTER — CLINICAL SUPPORT NO REQUIREMENTS (OUTPATIENT)
Dept: CARDIOLOGY | Facility: CLINIC | Age: 84
End: 2019-10-22

## 2019-10-22 ENCOUNTER — TELEPHONE (OUTPATIENT)
Dept: CARDIOLOGY | Facility: CLINIC | Age: 84
End: 2019-10-22

## 2019-10-22 DIAGNOSIS — R00.1 BRADYCARDIA: ICD-10-CM

## 2019-10-22 DIAGNOSIS — I48.0 PAROXYSMAL ATRIAL FIBRILLATION (HCC): ICD-10-CM

## 2019-10-22 PROCEDURE — 93294 REM INTERROG EVL PM/LDLS PM: CPT | Performed by: INTERNAL MEDICINE

## 2019-10-22 PROCEDURE — 93296 REM INTERROG EVL PM/IDS: CPT | Performed by: INTERNAL MEDICINE

## 2019-10-22 NOTE — TELEPHONE ENCOUNTER
Called pt due to scheduled [employer] home monitor reading didn't transmit.  Left message for pt to return my call.    Wife called back and unable to get to transmit.  Poor cell signal.  She is going to try plugging it up to phone line and will call back tomorrow to see if connecting.

## 2020-02-18 RX ORDER — DOFETILIDE 0.5 MG/1
500 CAPSULE ORAL 2 TIMES DAILY
Qty: 180 CAPSULE | Refills: 2 | Status: SHIPPED | OUTPATIENT
Start: 2020-02-18 | End: 2020-11-18 | Stop reason: SDUPTHER

## 2020-03-05 ENCOUNTER — OFFICE VISIT (OUTPATIENT)
Dept: CARDIOLOGY | Facility: CLINIC | Age: 85
End: 2020-03-05

## 2020-03-05 VITALS
DIASTOLIC BLOOD PRESSURE: 76 MMHG | HEART RATE: 69 BPM | HEIGHT: 67 IN | BODY MASS INDEX: 27.15 KG/M2 | SYSTOLIC BLOOD PRESSURE: 118 MMHG | OXYGEN SATURATION: 98 % | WEIGHT: 173 LBS

## 2020-03-05 DIAGNOSIS — I48.92 ATRIAL FLUTTER, PAROXYSMAL (HCC): ICD-10-CM

## 2020-03-05 DIAGNOSIS — I48.0 PAROXYSMAL ATRIAL FIBRILLATION (HCC): Primary | ICD-10-CM

## 2020-03-05 DIAGNOSIS — I49.5 TACHY-BRADY SYNDROME (HCC): ICD-10-CM

## 2020-03-05 DIAGNOSIS — E78.5 HYPERLIPIDEMIA LDL GOAL <100: ICD-10-CM

## 2020-03-05 DIAGNOSIS — R42 DIZZINESS: ICD-10-CM

## 2020-03-05 DIAGNOSIS — Z95.0 PACEMAKER: ICD-10-CM

## 2020-03-05 PROCEDURE — 99214 OFFICE O/P EST MOD 30 MIN: CPT | Performed by: INTERNAL MEDICINE

## 2020-03-05 NOTE — PROGRESS NOTES
[facility] Cardiology    Patient ID: Sly Miranda is a 84 y.o.   male.  : 1935   Contact: [phone]    Encounter date: 2020    PCP: Ahmet Benavidez MD      Chief complaint:   Chief Complaint   Patient presents with   • PAF       Problem List:  1. Paroxysmal atrial fibrillation:  a. MAXIM/ECV, 10/22/2013, [facility]: EF 55%. Trace TR, mild MR. Successful ECV to sinus rhythm. Eliquis initiated with propafenone 150 mg q.12 h.   b. EKG, 2013: NSR at a rate of 64 bpm with a normal  QTC.   c. CHADS = 2 (Age > 75).  d. MAXIM in preparation for cardioversion, 2013, [facility]: EF in the lower limits of normal with a large amount of spontaneous contrast and the appearance of loosely formed thrombus in the AMIRAH. Eliquis initiated.  e. Propafenone discontinued 2013 secondary to ineffectiveness.   f. Echocardiogram, 2018: EF 50-55%. Trace-to-mild MR. Mild TR.  g. CTA of chest, 2018: No evidence of pulmonary embolus.  h. [place], 2018: 68% atrial flutter, longest 1 day 9 hours (total enrollment period was 2 days 6 hours).  2. Bradycardia:  a. Recorded in  with a history of intolerance to AV mele blocking agents.  b. [place], 2018, enrolled 2 days 6 hours: Tachy-chloé with 6 pauses > 3 seconds, longest 10.4 seconds. HR  bpm.   c. Pacemaker placement, 09/10/2018: [employer] DDDR PM 2272 SN [id number].  3. Dyslipidemia, on statin therapy.  4. Abdominal pain prompting admission to [facility], 2013:  a.  CT A/P, 2013: No acute abnormality.  5. Mesenteric calcified mass:  a. Recent CT scan, 2010,  by Dr. Aguilera, unchanged from prior.  6. Solitary pulmonary nodule:  a. X-ray chest pa and lateral, 2016: Chronic change; no active disease.  7. GERD.  8. Surgical history:  a. Hernia surgery, 10/20/1997.  b. Right partial total knee replacement, 2014, by Dr. Diaz.    No Known Allergies    Current  Medications:    Current Outpatient Medications:   •  aspirin 81 MG EC tablet, Take 81 mg by mouth Every Night., Disp: , Rfl:   •  B Complex Vitamins (VITAMIN B COMPLEX) capsule capsule, Take 1 capsule by mouth Daily., Disp: , Rfl:   •  Calcium-Magnesium-Vitamin D - MG-MG-UNIT tablet sustained-release 24 hour, Take 1 tablet by mouth Daily., Disp: , Rfl:   •  Cephalexin (KEFLEX PO), Take  by mouth As Needed., Disp: , Rfl:   •  Coenzyme Q10 (COQ10) 400 MG capsule, Take 400 mg by mouth Daily., Disp: , Rfl:   •  colestipol (COLESTID) 1 G tablet, Take 2 g by mouth Daily., Disp: , Rfl:   •  Cyanocobalamin (VITAMIN B12 PO), Take  by mouth Daily., Disp: , Rfl:   •  dofetilide (TIKOSYN) 500 MCG capsule, Take 1 capsule by mouth 2 (Two) Times a Day., Disp: 180 capsule, Rfl: 2  •  ELIQUIS 5 MG tablet tablet, Take 1 tablet by mouth Every 12 (Twelve) Hours., Disp: 180 tablet, Rfl: 3  •  levothyroxine (SYNTHROID, LEVOTHROID) 50 MCG tablet, Take 50 mcg by mouth Daily., Disp: , Rfl:   •  Magnesium Oxide 400 (240 Mg) MG tablet, Take 1 tablet by mouth Daily., Disp: 90 tablet, Rfl: 3  •  metoprolol tartrate (LOPRESSOR) 25 MG tablet, TAKE ONE TABLET BY MOUTH EVERY 12 HOURS, Disp: 180 tablet, Rfl: 3  •  omeprazole (PriLOSEC) 40 MG capsule, Take 40 mg by mouth Daily., Disp: , Rfl:   •  pramipexole (MIRAPEX) 0.25 MG tablet, Take 0.25 mg by mouth Every Night., Disp: , Rfl:   •  rosuvastatin (CRESTOR) 20 MG tablet, Take 1 tablet by mouth Daily., Disp: 90 tablet, Rfl: 1  •  silodosin (RAPAFLO) 8 MG capsule capsule, Take 8 mg by mouth Daily With Breakfast., Disp: , Rfl:   •  triamcinolone (KENALOG) 0.1 % cream, Apply  topically to the appropriate area as directed 2 (Two) Times a Day., Disp: , Rfl:   •  Zinc 50 MG capsule, Take 50 mg by mouth Daily., Disp: , Rfl:     HPI    Sly Miranda is a 84 y.o. male who presents today for 6 month follow up of paroxysmal atrial fibrillation, pacemaker for tachy-chloé, and hyperlipidemia.  "Since last visit, he has been experiencing some dizziness \"pretty much all the time\" when walking. He takes his blood pressure routinely at home, with readings typically 105-125 systolic, with rare readings in the 90's mmHg. His dizziness is not always associated to low blood pressure, and he sometimes has low BP's with no dizziness. Patient has otherwise been feeling well overall from a cardiovascular standpoint and denies chest pain, shortness of breath, palpitations, edema, and syncope.         The following portions of the patient's history were reviewed and updated as appropriate: allergies, current medications and problem list.    Pertinent positives as listed in the HPI.  All other systems reviewed are negative.         Vitals:    03/05/20 1139   BP: 118/76   BP Location: Right arm   Patient Position: Sitting   Pulse: 69   SpO2: 98%   Weight: 78.5 kg (173 lb)   Height: 170.2 cm (67\")       Physical Exam:  General: Alert and oriented.  Neck: Jugular venous pressure is within normal limits. Carotids have normal upstrokes without bruits.   Cardiovascular: Heart has a nondisplaced focal PMI. Regular rate and rhythm. No murmur, gallop or rub.  Lungs: Clear, no rales or wheezes. Equal expansion is noted.   Extremities: Show no edema.  Skin: Warm and dry.  Neurologic: Nonfocal.     Diagnostic Data (reviewed with patient):    FLP, 03/21/2019:      HDL 46  LDL 53    Procedures     MANUAL DEVICE INTERROGATION, 3/5/2020, [employer] pacemaker, DDDR :   RA pacing 95%, RV pacing 6.3%.  P wave is 2.6 mV with a threshold of 0.62 V at 0.5 msec and an impedance of 400 ohms.   R wave is 9.6 mV with a threshold of 0.5 V at 0.5 msec and an impedance of 700 ohms.  Battery voltage is 3.01 V (9.1-9.9 years)  Events: All mode switches are atrial noise. No HVR.  Device updates: none.        Assessment:    ICD-10-CM ICD-9-CM   1. Paroxysmal atrial fibrillation (CMS/[facility]) I48.0 427.31   2. Atrial flutter, paroxysmal " (CMS/[facility]) I48.92 427.32   3. Tachy-chloé syndrome (CMS/[facility]) I49.5 427.81   4. Pacemaker Z95.0 V45.01   5. Dizziness R42 780.4   6. Hyperlipidemia LDL goal <100 E78.5 272.4         Plan:  1. Increase sodium intake/drink Gatorade on low blood pressure days. Patient may take metoprolol later in the day if BP is low in the morning. Pt instructed to make sure he takes Tikosyn every 12 hours.   2. Pt also reminded to make sure he stays hydrated with water or Gatorade during the Summer to avoid dehydration/dizziness.  3. Continue Eliquis 5 mg BID for stroke prophylaxis.   4. Continue Tikosyn for rhythm control, metoprolol for rate control.  5. Continue rosuvastatin 20 mg and colestipol for hyperlipidemia.  6. Continue all other current medications.  7. F/up in 6 months with device check, sooner if needed.      Scribed for Ana Huynh MD by Neela Stewart. 3/5/2020  11:45 AM     I Ana Huynh MD personally performed the services described in this documentation as scribed by the above individual in my presence, and it is both accurate and complete.    Ana Huynh MD, [facility]

## 2020-09-03 ENCOUNTER — HOSPITAL ENCOUNTER (OUTPATIENT)
Facility: HOSPITAL | Age: 85
Discharge: HOME | End: 2020-09-03
Payer: MEDICARE

## 2020-09-03 ENCOUNTER — OFFICE VISIT (OUTPATIENT)
Dept: CARDIOLOGY | Facility: CLINIC | Age: 85
End: 2020-09-03

## 2020-09-03 VITALS
DIASTOLIC BLOOD PRESSURE: 62 MMHG | SYSTOLIC BLOOD PRESSURE: 114 MMHG | OXYGEN SATURATION: 98 % | HEIGHT: 67 IN | WEIGHT: 176 LBS | BODY MASS INDEX: 27.62 KG/M2 | HEART RATE: 64 BPM

## 2020-09-03 DIAGNOSIS — I48.0 PAROXYSMAL ATRIAL FIBRILLATION (HCC): Primary | ICD-10-CM

## 2020-09-03 DIAGNOSIS — Z95.0 PACEMAKER: ICD-10-CM

## 2020-09-03 DIAGNOSIS — E78.5 HYPERLIPIDEMIA LDL GOAL <100: ICD-10-CM

## 2020-09-03 DIAGNOSIS — I49.5 TACHY-BRADY SYNDROME (HCC): ICD-10-CM

## 2020-09-03 PROCEDURE — 93000 ELECTROCARDIOGRAM COMPLETE: CPT | Performed by: INTERNAL MEDICINE

## 2020-09-03 PROCEDURE — 93005 ELECTROCARDIOGRAM TRACING: CPT

## 2020-09-03 PROCEDURE — 99213 OFFICE O/P EST LOW 20 MIN: CPT | Performed by: INTERNAL MEDICINE

## 2020-09-03 NOTE — PROGRESS NOTES
[facility] Cardiology    Patient ID: Sly Miranda is a 85 y.o. male.  : 1935   Contact: [phone]    Encounter date: 2020    PCP: Ahmet Benavidez MD      Chief complaint:   Chief Complaint   Patient presents with   • Paroxysmal atrial fibrillation (CMS/HCC)       Problem List:  1. Paroxysmal atrial fibrillation:  a. MAXIM/ECV, 10/22/2013, Dr. Huynh: EF 55%. Trace TR, mild MR. Successful ECV to sinus rhythm. Eliquis initiated with propafenone 150 mg q.12 h.   b. EKG, 2013, revealing NSR at a rate of 64 bpm with a normal  QTC.   c. CHADS-VASc=2 (Age>75).  d. MAXIM in preparation for cardioversion, 2013, Dr. Huynh: EF in the lower limits of normal with a large amount of spontaneous contrast and the appearance of loosely formed thrombus in the left atrial appendage. Eliquis initiated.  e. Propafenone discontinued 2013 secondary to ineffectiveness.   f. Echo, 2018: EF 50-55%. Trace-to-mild MR. Mild TR.  g. CT Angio of chest, 2018: no evidence of pulmonary embolus.  h. Holter, 2018: Bradycardia with long pauses.  2. Bradycardia:  a. Recorded in  with a history of intolerance  to AV mele blocking agents.  b. Insertion of [employer] DDDR Pacemaker, 09/10/2018: PM 2272  SN [id number].  3. Dyslipidemia, on statin therapy.  4. Abdominal pain prompting [facility] to [facility], 2013:  a.  CT scan of the abdomen and pelvis showing no acute abnormality, 2013.  5. Mesenteric calcified mass:  a. Recent CT scan, 2010,  by Dr. Aguilera, unchanged from prior.  6. Solitary pulmonary nodule:  a. X-ray chest pa and lateral, 2016: Chronic change; no active disease.  7. GERD.  8. Surgical history:  a. Hernia surgery, 10/20/1997.  b. Right partial total knee replacement, 2014, by Dr. Diaz.    No Known Allergies    Current Medications:    Current Outpatient Medications:   •  apixaban (Eliquis) 5 MG tablet  tablet, Take 1 tablet by mouth Every 12 (Twelve) Hours., Disp: 180 tablet, Rfl: 3  •  aspirin 81 MG EC tablet, Take 81 mg by mouth Every Night., Disp: , Rfl:   •  Calcium-Magnesium-Vitamin D - MG-MG-UNIT tablet sustained-release 24 hour, Take 1 tablet by mouth Daily., Disp: , Rfl:   •  Coenzyme Q10 (COQ10) 400 MG capsule, Take 400 mg by mouth Daily., Disp: , Rfl:   •  colestipol (COLESTID) 1 G tablet, Take 2 g by mouth Daily., Disp: , Rfl:   •  Cyanocobalamin (VITAMIN B12 PO), Take  by mouth Daily., Disp: , Rfl:   •  dofetilide (TIKOSYN) 500 MCG capsule, Take 1 capsule by mouth 2 (Two) Times a Day., Disp: 180 capsule, Rfl: 2  •  levothyroxine (SYNTHROID, LEVOTHROID) 50 MCG tablet, Take 50 mcg by mouth Daily., Disp: , Rfl:   •  Magnesium Oxide 400 (240 Mg) MG tablet, Take 1 tablet by mouth Daily., Disp: 90 tablet, Rfl: 3  •  metoprolol tartrate (LOPRESSOR) 25 MG tablet, TAKE ONE TABLET BY MOUTH EVERY 12 HOURS, Disp: 180 tablet, Rfl: 3  •  omeprazole (PriLOSEC) 40 MG capsule, Take 40 mg by mouth Daily., Disp: , Rfl:   •  pramipexole (MIRAPEX) 0.25 MG tablet, Take 0.25 mg by mouth Every Night., Disp: , Rfl:   •  rosuvastatin (CRESTOR) 20 MG tablet, Take 1 tablet by mouth Daily., Disp: 90 tablet, Rfl: 1  •  silodosin (RAPAFLO) 8 MG capsule capsule, Take 8 mg by mouth Daily With Breakfast., Disp: , Rfl:   •  triamcinolone (KENALOG) 0.1 % cream, Apply  topically to the appropriate area as directed 2 (Two) Times a Day., Disp: , Rfl:   •  Zinc 50 MG capsule, Take 50 mg by mouth Daily., Disp: , Rfl:     HPI    Sly Miranda is a 85 y.o. male who presents today for annual follow up of paroxysmal atrial fibrillation, pacemaker, and cardiac risk factors. He has been experiencing dizziness and balance problems which occur while walking. He has been going to physical therapy for the past 4 weeks for this problem. He reports he has not had difficulty breathing other than occasional mild episodes. Patient denies  "chest pain, shortness of breath, palpitations, edema, dizziness, and syncope.       The following portions of the patient's history were reviewed and updated as appropriate: allergies, current medications and problem list.    Pertinent positives as listed in the HPI.  All other systems reviewed are negative.         Vitals:    09/03/20 1422   BP: 114/62   BP Location: Left arm   Patient Position: Sitting   Pulse: 64   SpO2: 98%   Weight: 79.8 kg (176 lb)   Height: 170.2 cm (67\")       Physical Exam:  General: Alert and oriented.  Neck: Jugular venous pressure is within normal limits. Carotids have normal upstrokes without bruits.   Cardiovascular: Heart has a nondisplaced focal PMI. Regular rate and rhythm. No murmur, gallop or rub.  Lungs: Clear, no rales or wheezes. Equal expansion is noted.   Extremities: Show no edema.  Skin: Warm and dry.  Neurologic: Nonfocal.     Diagnostic Data (reviewed with patient):  Lab date: 9/1/2020  • FLP: , , HDL 38, LDL 66  • CMP: Glu 105, BUN 15, Creat 1.28, eGFR 51, Na 137, K 4.7, Cl 100, CO2 25, Ca 9.4, Alk Phos 66, AST 28, ALT 39    MANUAL DEVICE INTERROGATION, 9/3/2020, [facility]:   RA pacing 90%, RV pacing 13%.  P wave is 1.0 mV with a threshold of 0.6 V at 0.5 msec and an impedance of 400 ohms.   R wave is 10.3 mV with a threshold of 0.75 V at 0.5 msec and an impedance of 760 ohms.  Battery voltage is 9.8 years  Events: Mode switch x1, 2.5 hours with 0 RVR  Device updates: Increase PVAB to 150 to avoid oversense.      ECG 12 Lead  Date/Time: 9/3/2020 2:33 PM  Performed by: Ana Huynh MD  Authorized by: Ana Huynh MD   Comparison: compared with previous ECG from 8/15/2019  Comparison to previous ECG: Now AV paced vs. A paced on previous    Rhythm: paced  BPM: 83  Pacing capture: AV paced.  Clinical impression: abnormal EKG  Comments: Occasional PVC.               Assessment:    ICD-10-CM ICD-9-CM   1. Paroxysmal atrial fibrillation (CMS/HCC) " I48.0 427.31   2. Tachy-chloé syndrome (CMS/HCC) I49.5 427.81   3. Pacemaker Z95.0 V45.01   4. Hyperlipidemia LDL goal <100 E78.5 272.4         Plan:  1. Continue Tikosyn for atrial fibrillation   2. Continue metoprolol for rate control.  3. Continue Eliquis for stroke prophylaxis.  4. Continue colestipol and rosuvastatin for hyperlipidemia.  5. Continue all other current medications.  6. F/up in 6 months with [employer] check, sooner if needed.    I, Manolo Sultana, attest that this documentation has been prepared under the direction and in the presence of Ana Huynh MD 09/03/2020    I Ana Huynh MD personally performed the services described in this documentation as scribed by the above individual in my presence, and it is both accurate and complete.    Ana Huynh MD, FACC

## 2020-10-23 ENCOUNTER — OUTSIDE FACILITY SERVICE (OUTPATIENT)
Dept: CARDIOLOGY | Facility: CLINIC | Age: 85
End: 2020-10-23

## 2020-10-23 ENCOUNTER — TELEPHONE (OUTPATIENT)
Dept: CARDIOLOGY | Facility: CLINIC | Age: 85
End: 2020-10-23

## 2020-10-23 ENCOUNTER — HOSPITAL ENCOUNTER (OUTPATIENT)
Facility: HOSPITAL | Age: 85
Discharge: HOME | End: 2020-10-23
Payer: MEDICARE

## 2020-10-23 DIAGNOSIS — I48.0 PAROXYSMAL ATRIAL FIBRILLATION (HCC): Primary | ICD-10-CM

## 2020-10-23 PROCEDURE — 93005 ELECTROCARDIOGRAM TRACING: CPT

## 2020-10-23 PROCEDURE — 93010 ELECTROCARDIOGRAM REPORT: CPT | Performed by: INTERNAL MEDICINE

## 2020-10-23 NOTE — TELEPHONE ENCOUNTER
>> Pt's spouse called this AM to report that pt's HR has been elevated (120s) since yesterday (10/22/2020); BP 95/74, pt states he is SOB.   >> Pacemaker transmissions reviewed in [employer] website: , ongoing. See report in [facility]UR[facility].   >>Per Dr. Lino Baum’s nurse, pt advised to go to [facility] for EKG which will be faxed to our office.   >>Pt notified to proceed to [facility] for EKG per Yulissa Govea RN.

## 2020-10-23 NOTE — TELEPHONE ENCOUNTER
EKG received and rate is in the 80s now and put on Dr Huynh desk for review.  Spoke to wife and made aware of this.  He is feeling ok at this time.

## 2020-10-27 LAB
EKG ATRIAL RATE: 84 BPM
EKG DIAGNOSIS: NORMAL
EKG Q-T INTERVAL: 466 MS
EKG QRS DURATION: 162 MS
EKG QTC CALCULATION (BAZETT): 550 MS
EKG R AXIS: -86 DEGREES
EKG T AXIS: 67 DEGREES
EKG VENTRICULAR RATE: 84 BPM

## 2020-10-27 NOTE — TELEPHONE ENCOUNTER
Dr. Huynh reviewed EKG and remote transmission- may increase Metoprolol to 50 mg BID and see if this controls his episodes- does not appear to be afib, like the wife is insisting that he is having-   I explained this to the pt's wife- they will make the increase of metoprolol and call me next week with an update

## 2020-11-19 RX ORDER — DOFETILIDE 0.5 MG/1
500 CAPSULE ORAL 2 TIMES DAILY
Qty: 180 CAPSULE | Refills: 2 | Status: SHIPPED | OUTPATIENT
Start: 2020-11-19 | End: 2021-06-07 | Stop reason: SDUPTHER

## 2021-04-01 ENCOUNTER — OFFICE VISIT (OUTPATIENT)
Dept: CARDIOLOGY | Facility: CLINIC | Age: 86
End: 2021-04-01

## 2021-04-01 ENCOUNTER — HOSPITAL ENCOUNTER (OUTPATIENT)
Facility: HOSPITAL | Age: 86
Discharge: HOME | End: 2021-04-01
Payer: MEDICARE

## 2021-04-01 VITALS
OXYGEN SATURATION: 99 % | HEIGHT: 67 IN | WEIGHT: 169 LBS | HEART RATE: 75 BPM | DIASTOLIC BLOOD PRESSURE: 74 MMHG | BODY MASS INDEX: 26.53 KG/M2 | SYSTOLIC BLOOD PRESSURE: 116 MMHG

## 2021-04-01 DIAGNOSIS — I48.0 PAROXYSMAL ATRIAL FIBRILLATION (HCC): Primary | ICD-10-CM

## 2021-04-01 DIAGNOSIS — I49.5 TACHY-BRADY SYNDROME (HCC): ICD-10-CM

## 2021-04-01 DIAGNOSIS — E78.5 HYPERLIPIDEMIA LDL GOAL <100: ICD-10-CM

## 2021-04-01 DIAGNOSIS — Z95.0 PACEMAKER: ICD-10-CM

## 2021-04-01 PROCEDURE — 99213 OFFICE O/P EST LOW 20 MIN: CPT | Performed by: NURSE PRACTITIONER

## 2021-04-01 PROCEDURE — 93005 ELECTROCARDIOGRAM TRACING: CPT

## 2021-04-01 PROCEDURE — 93000 ELECTROCARDIOGRAM COMPLETE: CPT | Performed by: NURSE PRACTITIONER

## 2021-04-01 RX ORDER — DUTASTERIDE 0.5 MG/1
0.5 CAPSULE, LIQUID FILLED ORAL DAILY
COMMUNITY
End: 2022-07-07

## 2021-04-01 NOTE — PROGRESS NOTES
[facility] Cardiology    Patient ID: Sly Miranda is a 85 y.o. male.  : 1935   Contact: [phone]    Encounter date: 2021    PCP: Ahmet Benavidez MD      Chief complaint:   Chief Complaint   Patient presents with   • Paroxysmal atrial fibrillation (CMS/HCC)     Problem List:  1. Paroxysmal atrial fibrillation:  a. MAXIM/ECV, 10/22/2013, Dr. Huynh: EF 55%. Trace TR, mild MR. Successful ECV to sinus rhythm. Eliquis initiated with propafenone 150 mg q.12 h.   b. EKG, 2013, revealing NSR at a rate of 64 bpm with a normal  QTC.   c. CHADS-VASc=2 (Age>75).  d. MAXIM in preparation for cardioversion, 2013, Dr. Huynh: EF in the lower limits of normal with a large amount of spontaneous contrast and the appearance of loosely formed thrombus in the left atrial appendage. Eliquis initiated.  e. Propafenone discontinued 2013 secondary to ineffectiveness.   f. Echo, 2018: EF 50-55%. Trace-to-mild MR. Mild TR.  g. CT Angio of chest, 2018: no evidence of pulmonary embolus.  h. Holter, 2018: Bradycardia with long pauses.  2. Bradycardia:  a. Recorded in  with a history of intolerance  to AV mele blocking agents.  b. Insertion of [employer] DDDR Pacemaker, 09/10/2018: PM 2272  SN [id number].  3. Dyslipidemia, on statin therapy.  4. Abdominal pain prompting admission to [facility], 2013:  a.  CT scan of the abdomen and pelvis showing no acute abnormality, 2013.  5. Mesenteric calcified mass:  a. Recent CT scan, 2010,  by Dr. Aguilera, unchanged from prior.  6. Solitary pulmonary nodule:  a. X-ray chest pa and lateral, 2016: Chronic change; no active disease.  7. GERD.  8. Surgical history:  a. Hernia surgery, 10/20/1997.  b. Right partial total knee replacement, 2014, by Dr. Diaz.    No Known Allergies    Current Medications:    Current Outpatient Medications:   •  apixaban (Eliquis) 5 MG tablet tablet,  Take 1 tablet by mouth Every 12 (Twelve) Hours., Disp: 180 tablet, Rfl: 3  •  aspirin 81 MG EC tablet, Take 81 mg by mouth Every Night., Disp: , Rfl:   •  Calcium-Magnesium-Vitamin D - MG-MG-UNIT tablet sustained-release 24 hour, Take 1 tablet by mouth Daily., Disp: , Rfl:   •  Coenzyme Q10 (COQ10) 400 MG capsule, Take 400 mg by mouth Daily., Disp: , Rfl:   •  colestipol (COLESTID) 1 G tablet, Take 2 g by mouth Daily., Disp: , Rfl:   •  Cyanocobalamin (VITAMIN B12 PO), Take  by mouth Daily., Disp: , Rfl:   •  dofetilide (TIKOSYN) 500 MCG capsule, Take 1 capsule by mouth 2 (Two) Times a Day., Disp: 180 capsule, Rfl: 2  •  dutasteride (AVODART) 0.5 MG capsule, Take 0.5 mg by mouth Daily., Disp: , Rfl:   •  levothyroxine (SYNTHROID, LEVOTHROID) 50 MCG tablet, Take 50 mcg by mouth Daily., Disp: , Rfl:   •  Magnesium Oxide 400 (240 Mg) MG tablet, Take 1 tablet by mouth Daily., Disp: 90 tablet, Rfl: 3  •  metoprolol tartrate (LOPRESSOR) 25 MG tablet, TAKE ONE TABLET BY MOUTH EVERY 12 HOURS, Disp: 180 tablet, Rfl: 3  •  omeprazole (PriLOSEC) 40 MG capsule, Take 40 mg by mouth Daily., Disp: , Rfl:   •  rosuvastatin (CRESTOR) 20 MG tablet, Take 1 tablet by mouth Daily., Disp: 90 tablet, Rfl: 1  •  silodosin (RAPAFLO) 8 MG capsule capsule, Take 8 mg by mouth Daily With Breakfast., Disp: , Rfl:   •  triamcinolone (KENALOG) 0.1 % cream, Apply  topically to the appropriate area as directed 2 (Two) Times a Day., Disp: , Rfl:   •  Zinc 50 MG capsule, Take 50 mg by mouth Daily., Disp: , Rfl:     HPI    Sly Miranda is a 85 y.o. male who presents today for a 6 month follow up of paroxysmal atrial fibrillation, tachy-chloé syndrome, and hyperlipidemia. Since last visit, patient has done well overall. No tachypalps. He has chronic unchanged dizziness. No new complaints or cardiac issues.        The following portions of the patient's history were reviewed and updated as appropriate: allergies, current medications and  "problem list.    Pertinent positives as listed in the HPI.  All other systems reviewed are negative.         Vitals:    04/01/21 1447   BP: 116/74   BP Location: Left arm   Patient Position: Sitting   Pulse: 75   SpO2: 99%   Weight: 76.7 kg (169 lb)   Height: 170.2 cm (67\")       Physical Exam:  General: Alert and oriented.  Neck: Jugular venous pressure is within normal limits. Carotids have normal upstrokes without bruits.   Cardiovascular: Heart has a nondisplaced focal PMI. Regular rate and rhythm. No murmur, gallop or rub.  Lungs: Clear, no rales or wheezes. Equal expansion is noted.   Extremities: Show no edema.  Skin: Warm and dry.  Neurologic: Nonfocal.     Diagnostic Data (reviewed with patient):     Device check 4/1/21: RA 84%,  16%, normal threshold and impedance, battery life 9.3-9.7 years. <1% AMS, longest 2 hours (some real Afib, some lead noise).     Lab date: 8/10/2020  • FLP: , , HDL 38, LDL 66  • CMP: Glu 105, BUN 12, Creat 1.28, eGFR 51, Na 137, K 4.7, Cl 100, CO2 25, Ca 9.4, Alk Phos 66, AST 28, ALT 39  • CBC: WBC 7.3, RBC 5.46, HGB 16, HCT 47.3, MCV 87, MCH 29.3,   • HbA1c: 6.1      ECG 12 Lead    Date/Time: 4/1/2021 3:08 PM  Performed by: Rita Dixon APRN  Authorized by: Rita Dixon APRN   Comparison: compared with previous ECG from 10/23/2020  Rhythm: paced  BPM: 69  Comments: 440 ms              Assessment:    ICD-10-CM ICD-9-CM   1. Paroxysmal atrial fibrillation (CMS/HCC)  I48.0 427.31   2. Tachy-chloé syndrome (CMS/HCC)  I49.5 427.81   3. Pacemaker  Z95.0 V45.01   4. Hyperlipidemia LDL goal <100  E78.5 272.4         Plan:  1. Continue Eliquis, metoprolol and Tikosyn for AFib.   2. Continue statin for HLD.   3. Continue all other current medications.  4. F/up in 6 months, sooner if needed.      Electronically signed by APRYL Contreras, 04/01/21, 3:06 PM EDT.              "

## 2021-05-03 PROCEDURE — 93296 REM INTERROG EVL PM/IDS: CPT | Performed by: INTERNAL MEDICINE

## 2021-05-03 PROCEDURE — 93294 REM INTERROG EVL PM/LDLS PM: CPT | Performed by: INTERNAL MEDICINE

## 2021-06-07 RX ORDER — DOFETILIDE 0.5 MG/1
500 CAPSULE ORAL 2 TIMES DAILY
Qty: 180 CAPSULE | Refills: 2 | Status: SHIPPED | OUTPATIENT
Start: 2021-06-07 | End: 2021-08-17 | Stop reason: SDUPTHER

## 2021-08-02 PROCEDURE — 93294 REM INTERROG EVL PM/LDLS PM: CPT | Performed by: INTERNAL MEDICINE

## 2021-08-02 PROCEDURE — 93296 REM INTERROG EVL PM/IDS: CPT | Performed by: INTERNAL MEDICINE

## 2021-08-17 RX ORDER — DOFETILIDE 0.5 MG/1
500 CAPSULE ORAL 2 TIMES DAILY
Qty: 180 CAPSULE | Refills: 1 | Status: SHIPPED | OUTPATIENT
Start: 2021-08-17 | End: 2021-08-23 | Stop reason: SDUPTHER

## 2021-08-23 RX ORDER — DOFETILIDE 0.5 MG/1
500 CAPSULE ORAL 2 TIMES DAILY
Qty: 180 CAPSULE | Refills: 1 | Status: SHIPPED | OUTPATIENT
Start: 2021-08-23 | End: 2021-08-23 | Stop reason: SDUPTHER

## 2021-08-23 RX ORDER — DOFETILIDE 0.5 MG/1
500 CAPSULE ORAL 2 TIMES DAILY
Qty: 180 CAPSULE | Refills: 1 | Status: SHIPPED | OUTPATIENT
Start: 2021-08-23 | End: 2022-01-04 | Stop reason: SDUPTHER

## 2021-09-24 ENCOUNTER — HOSPITAL ENCOUNTER (OUTPATIENT)
Facility: HOSPITAL | Age: 86
Discharge: HOME | End: 2021-09-24
Payer: MEDICARE

## 2021-09-24 ENCOUNTER — TELEPHONE (OUTPATIENT)
Dept: CARDIOLOGY | Facility: CLINIC | Age: 86
End: 2021-09-24

## 2021-09-24 LAB
A/G RATIO: 1.5 (ref 0.8–2)
ALBUMIN SERPL-MCNC: 4.6 G/DL (ref 3.4–4.8)
ALP BLD-CCNC: 69 U/L (ref 25–100)
ALT SERPL-CCNC: 36 U/L (ref 4–36)
ANION GAP SERPL CALCULATED.3IONS-SCNC: 9 MMOL/L (ref 3–16)
AST SERPL-CCNC: 26 U/L (ref 8–33)
BILIRUB SERPL-MCNC: 0.4 MG/DL (ref 0.3–1.2)
BUN BLDV-MCNC: 16 MG/DL (ref 6–20)
CALCIUM SERPL-MCNC: 9.4 MG/DL (ref 8.5–10.5)
CHLORIDE BLD-SCNC: 97 MMOL/L (ref 98–107)
CO2: 26 MMOL/L (ref 20–30)
CREAT SERPL-MCNC: 1.1 MG/DL (ref 0.4–1.2)
D DIMER: <0.27 UG/ML FEU (ref 0–0.6)
GFR AFRICAN AMERICAN: >59
GFR NON-AFRICAN AMERICAN: >59
GLOBULIN: 3.1 G/DL
GLUCOSE BLD-MCNC: 144 MG/DL (ref 74–106)
HCT VFR BLD CALC: 46.7 % (ref 40–54)
HEMOGLOBIN: 15.2 G/DL (ref 13–18)
MAGNESIUM: 2 MG/DL (ref 1.7–2.4)
MCH RBC QN AUTO: 28.6 PG (ref 27–32)
MCHC RBC AUTO-ENTMCNC: 32.5 G/DL (ref 31–35)
MCV RBC AUTO: 87.9 FL (ref 80–100)
PDW BLD-RTO: 13 % (ref 11–16)
PLATELET # BLD: 238 K/UL (ref 150–400)
PMV BLD AUTO: 9.4 FL (ref 6–10)
POTASSIUM SERPL-SCNC: 4.2 MMOL/L (ref 3.4–5.1)
PRO-BNP: 981 PG/ML (ref 0–1800)
RBC # BLD: 5.31 M/UL (ref 4.5–6)
SODIUM BLD-SCNC: 132 MMOL/L (ref 136–145)
TOTAL PROTEIN: 7.7 G/DL (ref 6.4–8.3)
TROPONIN: <0.3 NG/ML
WBC # BLD: 8.3 K/UL (ref 4–11)

## 2021-09-24 PROCEDURE — 85379 FIBRIN DEGRADATION QUANT: CPT

## 2021-09-24 PROCEDURE — 85027 COMPLETE CBC AUTOMATED: CPT

## 2021-09-24 PROCEDURE — 36415 COLL VENOUS BLD VENIPUNCTURE: CPT

## 2021-09-24 PROCEDURE — 83880 ASSAY OF NATRIURETIC PEPTIDE: CPT

## 2021-09-24 PROCEDURE — 83735 ASSAY OF MAGNESIUM: CPT

## 2021-09-24 PROCEDURE — 84484 ASSAY OF TROPONIN QUANT: CPT

## 2021-09-24 PROCEDURE — 80053 COMPREHEN METABOLIC PANEL: CPT

## 2021-09-24 PROCEDURE — 82553 CREATINE MB FRACTION: CPT

## 2021-09-24 PROCEDURE — 83874 ASSAY OF MYOGLOBIN: CPT

## 2021-09-24 NOTE — TELEPHONE ENCOUNTER
Jessica from Dr. Benavidez's office left a msg that pt was in today and has an abnormal EKG- changes compared to the last- chest tightness, dizziness, and feels faintly.    I called but had to leave a message- also sent a fax requesting the EKG be faxed to [phone] and I will have a provider to look at it. Also requested his BP and HR and any other documents from his visit today.

## 2021-09-26 LAB — MYOGLOBIN: 110 NG/ML (ref 28–72)

## 2021-09-27 LAB — MISCELLANEOUS LAB TEST ORDER: NORMAL

## 2021-10-11 ENCOUNTER — TRANSCRIBE ORDERS (OUTPATIENT)
Dept: ADMINISTRATIVE | Facility: HOSPITAL | Age: 86
End: 2021-10-11

## 2021-10-11 DIAGNOSIS — R42 DIZZINESS: Primary | ICD-10-CM

## 2021-10-11 DIAGNOSIS — R51.9 FREQUENT HEADACHES: ICD-10-CM

## 2021-10-21 ENCOUNTER — OFFICE VISIT (OUTPATIENT)
Dept: CARDIOLOGY | Facility: CLINIC | Age: 86
End: 2021-10-21

## 2021-10-21 VITALS
WEIGHT: 172 LBS | SYSTOLIC BLOOD PRESSURE: 122 MMHG | HEART RATE: 73 BPM | OXYGEN SATURATION: 96 % | DIASTOLIC BLOOD PRESSURE: 80 MMHG | BODY MASS INDEX: 27 KG/M2 | HEIGHT: 67 IN

## 2021-10-21 DIAGNOSIS — E78.5 HYPERLIPIDEMIA LDL GOAL <100: ICD-10-CM

## 2021-10-21 DIAGNOSIS — I48.0 PAROXYSMAL ATRIAL FIBRILLATION (HCC): Primary | ICD-10-CM

## 2021-10-21 DIAGNOSIS — Z95.0 PACEMAKER: ICD-10-CM

## 2021-10-21 DIAGNOSIS — I49.5 TACHY-BRADY SYNDROME (HCC): ICD-10-CM

## 2021-10-21 PROCEDURE — 99214 OFFICE O/P EST MOD 30 MIN: CPT | Performed by: INTERNAL MEDICINE

## 2021-10-21 NOTE — PROGRESS NOTES
[facility] Cardiology    Patient ID: Sly Miranda is a 86 y.o. male.  : 1935   Contact: [phone]    Encounter date: 10/21/2021    PCP: Ahmet Benavidez MD      Chief complaint:   Chief Complaint   Patient presents with   • Paroxysmal atrial fibrillation (CMS/HCC)       Problem List:  1. Paroxysmal atrial fibrillation:  a. MAXIM/ECV, 10/22/2013, Dr. Huynh: EF 55%. Trace TR, mild MR. Successful ECV to sinus rhythm. Eliquis initiated with propafenone 150 mg q.12 h.   b. EKG, 2013, revealing NSR at a rate of 64 bpm with a normal  QTC.   c. CHADS-VASc=2 (Age>75).  d. MAXIM in preparation for cardioversion, 2013, Dr. Huynh: EF in the lower limits of normal with a large amount of spontaneous contrast and the appearance of loosely formed thrombus in the left atrial appendage. Eliquis initiated.  e. Propafenone discontinued 2013 secondary to ineffectiveness.   f. Echo, 2018: EF 50-55%. Trace-to-mild MR. Mild TR.  g. CT Angio of chest, 2018: no evidence of pulmonary embolus.  h. Holter, 2018: Bradycardia with long pauses.  i. Holter monitor, 2021: Monitored for 5 days. Paced rhythm. First degree AVB. AF 0.1%, AFL 9.1%.  episodes, longest 19 beats @ average 136 bpm up to 163 bpm. PAC 2.6%. PVC 1.1%.  2. Bradycardia:  a. Recorded in  with a history of intolerance  to AV mele blocking agents.  b. Insertion of [employer] DDDR Pacemaker, 09/10/2018: PM 2272  SN [id number].  3. Dyslipidemia, on statin therapy.  4. Abdominal pain prompting admission to [facility], 2013:  a.  CT scan of the abdomen and pelvis showing no acute abnormality, 2013.  5. Mesenteric calcified mass:  a. Recent CT scan, 2010,  by Dr. Aguilera, unchanged from prior.  6. Solitary pulmonary nodule:  a. X-ray chest pa and lateral, 2016: Chronic change; no active disease.  7. GERD.  8. Surgical history:  a. Hernia surgery,  10/20/1997.  b. 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 Diaz.    No Known Allergies    Current Medications:    Current Outpatient Medications:   •  apixaban (Eliquis) 5 MG tablet tablet, Take 1 tablet by mouth Every 12 (Twelve) Hours., Disp: 180 tablet, Rfl: 3  •  aspirin 81 MG EC tablet, Take 81 mg by mouth Every Night., Disp: , Rfl:   •  Calcium-Magnesium-Vitamin D - MG-MG-UNIT tablet sustained-release 24 hour, Take 1 tablet by mouth Daily., Disp: , Rfl:   •  Coenzyme Q10 (COQ10) 400 MG capsule, Take 400 mg by mouth Daily., Disp: , Rfl:   •  colestipol (COLESTID) 1 G tablet, Take 2 g by mouth Daily., Disp: , Rfl:   •  Cyanocobalamin (VITAMIN B12 PO), Take  by mouth Daily., Disp: , Rfl:   •  dofetilide (TIKOSYN) 500 MCG capsule, Take 1 capsule by mouth 2 (Two) Times a Day. (Patient taking differently: Take 250 mcg by mouth 2 (Two) Times a Day.), Disp: 180 capsule, Rfl: 1  •  dutasteride (AVODART) 0.5 MG capsule, Take 0.5 mg by mouth Daily., Disp: , Rfl:   •  levothyroxine (SYNTHROID, LEVOTHROID) 50 MCG tablet, Take 50 mcg by mouth Daily., Disp: , Rfl:   •  Magnesium Oxide 400 (240 Mg) MG tablet, Take 1 tablet by mouth Daily., Disp: 90 tablet, Rfl: 3  •  metoprolol tartrate (LOPRESSOR) 25 MG tablet, TAKE ONE TABLET BY MOUTH EVERY 12 HOURS (Patient taking differently: Take 50 mg by mouth 2 (Two) Times a Day.), Disp: 180 tablet, Rfl: 3  •  omeprazole (PriLOSEC) 40 MG capsule, Take 40 mg by mouth Daily., Disp: , Rfl:   •  rosuvastatin (CRESTOR) 20 MG tablet, Take 1 tablet by mouth Daily., Disp: 90 tablet, Rfl: 1  •  silodosin (RAPAFLO) 8 MG capsule capsule, Take 8 mg by mouth Daily With Breakfast., Disp: , Rfl:   •  triamcinolone (KENALOG) 0.1 % cream, Apply  topically to the appropriate area as directed 2 (Two) Times a Day., Disp: , Rfl:   •  Zinc 50 MG capsule, Take 50 mg by mouth Daily., Disp: , Rfl:     HPI    Sly Miranda is a 86 y.o. male who presents today for a 6 month follow  "up of paroxysmal atrial fibrillation, tachy-chloé syndrome, and hyperlipidemia. Since last visit, he wore a monitor given to him by Dr. Benavidez and for some reason his Tikosyn dose was decreased to 250 mg every 12 hours.  Since that time his atrial fib/flutter has increased from 9% to 36% by his interrogation today.  He can tell when his heart rate goes fast.  He did increase his metoprolol to 50 mg p.o. twice daily.  His blood pressure and heart rate of been under good control most of the time.      The following portions of the patient's history were reviewed and updated as appropriate: allergies, current medications and problem list.    Pertinent positives as listed in the HPI.  All other systems reviewed are negative.         Vitals:    10/21/21 1427   BP: 122/80   BP Location: Left arm   Patient Position: Sitting   Pulse: 73   SpO2: 96%   Weight: 78 kg (172 lb)   Height: 170.2 cm (67\")       Physical Exam:  General: Alert and oriented.  Neck: Jugular venous pressure is within normal limits. Carotids have normal upstrokes without bruits.   Cardiovascular: Heart has a nondisplaced focal PMI. Regular rate and rhythm. No murmur, gallop or rub.  Lungs: Clear, no rales or wheezes. Equal expansion is noted.   Extremities: Show no edema.  Skin: Warm and dry.  Neurologic: Nonfocal.     Diagnostic Data (reviewed with patient)    Lab date: 2/10/2021  • FLP: , , HDL 29.8, LDL 76  • CMP: Glu 104, BUN 13, Creat 1.22, eGFR 54, Na 140, K 4.6, Cl 98, CO2 23, Ca 9.5, Alk Phos 72, AST 27, ALT 30  • HbA1c: 6.3      Procedures  [employer]h pacemaker interrogation DDDR 70/125  Right atrial pacing 89% time, RV pacing 20% of the time  P wave 2.1 mV with a threshold of 0.5 V at 0.5 ms.  Impedance 400 ohms.  R wave 11.2 mV with a threshold of 0.5 V at 0.5 seconds and impedance of 800 ohms.  Battery voltage is 2.99 with 9 years remaining.  He has had 36% mode switches the longest of which was 12 hours.      Assessment:    " ICD-10-CM ICD-9-CM   1. Paroxysmal atrial fibrillation (HCC)  I48.0 427.31   2. Tachy-chloé syndrome (HCC)  I49.5 427.81   3. Pacemaker  Z95.0 V45.01   4. Hyperlipidemia LDL goal <100  E78.5 272.4         Plan:  1. Continue on Eliquis 5 mg BID for stroke prophylaxis.   2. Increase Tikosyn back to 500 mg q 12 hrs for rhythm control.   3. Continue on metoprolol 50 mg q 12 hr for rate control.   4. Continue on rosuvastatin 20 mg daily for hyperlipidemia.   5. Continue all other current medications.  6. F/up in 6 months with [employer] pacemaker check, sooner if needed.      Ana Huynh MD, FACC

## 2021-10-22 RX ORDER — METOPROLOL TARTRATE 50 MG/1
50 TABLET, FILM COATED ORAL 2 TIMES DAILY
Qty: 180 TABLET | Refills: 3 | Status: SHIPPED | OUTPATIENT
Start: 2021-10-22 | End: 2022-07-07 | Stop reason: SDUPTHER

## 2021-10-25 ENCOUNTER — HOSPITAL ENCOUNTER (OUTPATIENT)
Facility: HOSPITAL | Age: 86
Discharge: HOME | End: 2021-10-25
Payer: MEDICARE

## 2021-10-25 PROCEDURE — 93005 ELECTROCARDIOGRAM TRACING: CPT

## 2021-10-27 LAB
EKG ATRIAL RATE: 69 BPM
EKG DIAGNOSIS: NORMAL
EKG P-R INTERVAL: 290 MS
EKG Q-T INTERVAL: 436 MS
EKG QRS DURATION: 128 MS
EKG QTC CALCULATION (BAZETT): 470 MS
EKG R AXIS: -65 DEGREES
EKG T AXIS: 77 DEGREES
EKG VENTRICULAR RATE: 70 BPM

## 2021-11-01 PROCEDURE — 93294 REM INTERROG EVL PM/LDLS PM: CPT | Performed by: INTERNAL MEDICINE

## 2021-11-01 PROCEDURE — 93296 REM INTERROG EVL PM/IDS: CPT | Performed by: INTERNAL MEDICINE

## 2021-12-20 ENCOUNTER — TELEPHONE (OUTPATIENT)
Dept: FAMILY MEDICINE CLINIC | Facility: CLINIC | Age: 86
End: 2021-12-20

## 2021-12-20 NOTE — TELEPHONE ENCOUNTER
Caller: VIVIAN    Relationship to patient: SPOUSE    Best call back number: [phone]    Chief complaint:   PT'S DIZZINESS HAS INCREASED AND THE PT IS HOPING TO BE SEEN SOONER    Type of visit:   NEW PT    Requested date:   ASAP    If rescheduling, when is the original appointment:   1-7-22

## 2021-12-23 ENCOUNTER — HOSPITAL ENCOUNTER (OUTPATIENT)
Facility: HOSPITAL | Age: 86
Discharge: HOME | End: 2021-12-23
Payer: MEDICARE

## 2021-12-23 LAB
T4 FREE: 1.46 NG/DL (ref 0.89–1.76)
TSH SERPL DL<=0.05 MIU/L-ACNC: 1.78 UIU/ML (ref 0.27–4.2)

## 2021-12-23 PROCEDURE — 84443 ASSAY THYROID STIM HORMONE: CPT

## 2021-12-23 PROCEDURE — 84439 ASSAY OF FREE THYROXINE: CPT

## 2021-12-23 PROCEDURE — 36415 COLL VENOUS BLD VENIPUNCTURE: CPT

## 2022-01-04 RX ORDER — DOFETILIDE 0.5 MG/1
500 CAPSULE ORAL 2 TIMES DAILY
Qty: 180 CAPSULE | Refills: 1 | Status: SHIPPED | OUTPATIENT
Start: 2022-01-04 | End: 2022-07-19

## 2022-01-31 PROCEDURE — 93296 REM INTERROG EVL PM/IDS: CPT | Performed by: INTERNAL MEDICINE

## 2022-01-31 PROCEDURE — 93294 REM INTERROG EVL PM/LDLS PM: CPT | Performed by: INTERNAL MEDICINE

## 2022-02-10 ENCOUNTER — OFFICE VISIT (OUTPATIENT)
Dept: NEUROLOGY | Facility: CLINIC | Age: 87
End: 2022-02-10

## 2022-02-10 VITALS
OXYGEN SATURATION: 96 % | WEIGHT: 169 LBS | DIASTOLIC BLOOD PRESSURE: 68 MMHG | HEIGHT: 67 IN | SYSTOLIC BLOOD PRESSURE: 102 MMHG | HEART RATE: 79 BPM | BODY MASS INDEX: 26.53 KG/M2

## 2022-02-10 DIAGNOSIS — R42 DIZZINESS: Primary | ICD-10-CM

## 2022-02-10 PROCEDURE — 99214 OFFICE O/P EST MOD 30 MIN: CPT | Performed by: NURSE PRACTITIONER

## 2022-02-10 NOTE — PROGRESS NOTES
Subjective:     Patient ID: Sly Miranda is a 86 y.o. male.    CC:   Chief Complaint   Patient presents with   • Dizziness     NP       HPI:   History of Present Illness   86 y.o. male presents as a new patient referred by Dr. Benavidez for dizziness.     Has been struggling with dizziness for about 5-7 years. If he turns his head too quickly he will feel like the room gives him a half turn. Will feel dizzy with exertion, such as carrying fire wood. Stands up slowly to make sure he doesn't feel off balance with standing.     Feels no dizziness when lying down or sitting still.     Denies N/V, headaches, weakness, N/T, bowel or bladder difficulties.     Fell a few months ago, had caught his foot on a vanity stool and lost his balance. Otherwise has not had any falls.     August of 2020 had a CT scan of the Brain and his wife states there was nothing concerning on the CT. They do not remember where that was done.     Has a PMH of A Fib on Eliquis, tachy/chloé syndrome with a pacemaker.     Has a pacemaker which is incompatible for MRI.     Medical records reviewed:  7/30/20 ENT record: Dizziness when he stands up, described as light headed and wobbly. Denies spinning sensation. Woozy sensation in that he may fall. ENT showed abnormal VNG. At risk for falls. Also suffers from orthostatic hypotension.     The following portions of the patient's history were reviewed and updated as appropriate: allergies, current medications, past family history, past medical history, past social history, past surgical history and problem list.    Past Medical History:   Diagnosis Date   • Abdominal pain     left side under ribcage   • Bradycardia     Recorded in 2008 with a history of intolerance to AV mele blocking agents.   • Bruises easily    • Calcified mesenteric mass     Recent CT scan, June 2010, by Dr. Aguilera, unchanged from prior.   • Cataracts, bilateral     removed bilaterally   • Chronic atrial fibrillation (HCC)      on Eliquis 5 mg b.i.d.    • Delayed emergence from anesthesia    • Disease of thyroid gland     hypothyroidism   • Dizziness    • Dyslipidemia     , followed by Dr. Benavidez. on statin therapy.   • Enlarged prostate    • Eye tearing, left    • GERD (gastroesophageal reflux disease)    • Tommy's disease    • H/O seasonal allergies    • Hard stool    • History of shingles 2008   • [place] (hard of hearing)     slight hearing loss   • Hyperlipidemia    • Injury of back due to fall     fell in the [employer] into a boat   • Melanoma (HCC)     right side of face on the chin   • Pacemaker    • Radiation exposure screen     was exposed to the hydrogen bomb   • Shortness of breath    • Snores    • Tattoo     left forearm   • Wears glasses     reading glasses       Past Surgical History:   Procedure Laterality Date   • CARDIAC ELECTROPHYSIOLOGY PROCEDURE N/A 9/10/2018    Procedure: Pacemaker DC new;  Surgeon: Donnell Pineda MD;  Location: [facility] INVASIVE LOC[facility];  Service: Cardiology   • COLONOSCOPY     • EYE SURGERY Bilateral     cataracts removed   • HERNIA REPAIR  10/20/1997    inguinal left side   • JOINT REPLACEMENT Bilateral     partial knee replacement   • KNEE SURGERY Left    • SKIN LESION EXCISION Right 5/20/2019    Procedure: WIDE EXCISION MELANOMA RIGHT FACE;  Surgeon: Dawson Charlton MD;  Location: [facility];  Service: General   • TONSILLECTOMY     • TOTAL KNEE ARTHROPLASTY Right 03/06/2014    Right partial total knee replacement       Social History     Socioeconomic History   • Marital status:    Tobacco Use   • Smoking status: Former Smoker     Types: Cigars   • Smokeless tobacco: Never Used   • Tobacco comment: quit over 40 years ago   Vaping Use   • Vaping Use: Never used   Substance and Sexual Activity   • Alcohol use: No   • Drug use: No   • Sexual activity: Defer       Family History   Problem Relation Age of Onset   • Arrhythmia Mother    • Stroke Father    • Heart attack Maternal Grandmother      "    Objective:  /68   Pulse 79   Ht 170.2 cm (67.01\")   Wt 76.7 kg (169 lb)   SpO2 96%   BMI 26.46 kg/m²     Neurologic Exam     Mental Status   Oriented to person, place, and time.   Follows 3 step commands.   Attention: normal. Concentration: normal.   Speech: speech is normal   Level of consciousness: alert  Knowledge: good and consistent with education.   Able to name object. Able to read. Able to repeat. Able to write. Normal comprehension.     Cranial Nerves     CN II   Visual fields full to confrontation.   Visual acuity: normal  Right visual field deficit: none  Left visual field deficit: none     CN III, IV, VI   Pupils are equal, round, and reactive to light.  Extraocular motions are normal.   Right pupil: Shape: regular. Reactivity: brisk. Consensual response: intact.   Left pupil: Shape: regular. Reactivity: brisk. Consensual response: intact.   Nystagmus: none   Diplopia: none  Ophthalmoparesis: none  Upgaze: normal  Downgaze: normal  Conjugate gaze: present  Vestibulo-ocular reflex: present    CN V   Facial sensation intact.   Right corneal reflex: normal  Left corneal reflex: normal    CN VII   Right facial weakness: none  Left facial weakness: none    CN VIII   Hearing: intact    CN IX, X   Palate: symmetric  Right gag reflex: normal  Left gag reflex: normal    CN XI   Right sternocleidomastoid strength: normal  Left sternocleidomastoid strength: normal    CN XII   Tongue: not atrophic  Fasciculations: absent  Tongue deviation: none    Motor Exam   Muscle bulk: normal  Overall muscle tone: normal    Strength   Strength 5/5 throughout.     Sensory Exam   Light touch normal.     Gait, Coordination, and Reflexes     Gait  Gait: shuffling and wide-based (block turn )    Coordination   Finger to nose coordination: normal    Tremor   Resting tremor: absent  Intention tremor: absent  Action tremor: absent    Reflexes   Reflexes 2+ except as noted.       Physical Exam  Vitals and nursing note " reviewed.   Constitutional:       Appearance: Normal appearance.   HENT:      Head: Normocephalic and atraumatic.   Eyes:      Extraocular Movements: Extraocular movements intact and EOM normal.      Pupils: Pupils are equal, round, and reactive to light.   Skin:     General: Skin is warm and dry.   Neurological:      Mental Status: He is alert and oriented to person, place, and time.      Coordination: Finger-Nose-Finger Test normal.      Deep Tendon Reflexes: Strength normal.   Psychiatric:         Mood and Affect: Mood normal.         Speech: Speech normal.         Assessment/Plan:       Diagnoses and all orders for this visit:    1. Dizziness (Primary)  Assessment & Plan:  Will obtain CT Head and CTA Carotids due to worsening of dizziness. Patient unable to have MRI due to pacemaker     Had blood work drawn yesterday with PCP, will request records.     Likely mixed etiology of vertigo as well as orthostatic hypotension and knee pain.     Recommend patient use a cane or a walker to ambulate in order to prevent falls.     Ordered PT     F/U in 6 weeks or sooner if needed         Orders:  -     CT Head With & Without Contrast; Future  -     CT Angiogram Carotids; Future  -     Ambulatory Referral to Physical Therapy Evaluate and treat, Vestibular           Reviewed medications, potential side effects and signs and symptoms to report. Discussed risk versus benefits of treatment plan with patient and/or family-including medications, labs and radiology that may be ordered. Addressed questions and concerns during visit. Patient and/or family verbalized understanding and agree with plan. Patient instructed to call the office with questions or concerns and report to ED with life-threatening symptoms.     AS THE PROVIDER, I PERSONALLY WORE PPE DURING ENTIRE FACE TO FACE ENCOUNTER IN CLINIC WITH THE PATIENT. PATIENT ALSO WORE PPE DURING ENTIRE FACE TO FACE ENCOUNTER EXCEPT FOR A MAX OF 30 SECONDS DURING NEUROLOGICAL  EVALUATION OF CRANIAL NERVES AND THEN MASK WAS PLACED BACK OVER PATIENT FACE FOR REMAINDER OF VISIT. I WASHED MY HANDS BEFORE AND AFTER VISIT.    During this visit the following were done:  Labs Reviewed []    Labs Ordered []    Radiology Reports Reviewed []    Radiology Ordered []    PCP Records Reviewed []    Referring Provider Records Reviewed []    ER Records Reviewed []    Hospital Records Reviewed []    History Obtained From Family []    Radiology Images Reviewed []    Other Reviewed []    Records Requested []      Return in about 6 weeks (around 3/24/2022).      Carol Moses, APRYL  2/10/2022

## 2022-02-10 NOTE — ASSESSMENT & PLAN NOTE
Will obtain CT Head and CTA Carotids due to worsening of dizziness. Patient unable to have MRI due to pacemaker     Had blood work drawn yesterday with PCP, will request records.     Likely mixed etiology of vertigo as well as orthostatic hypotension and knee pain.     Recommend patient use a cane or a walker to ambulate in order to prevent falls.     Ordered PT     F/U in 6 weeks or sooner if needed

## 2022-02-14 ENCOUNTER — TELEPHONE (OUTPATIENT)
Dept: NEUROLOGY | Facility: CLINIC | Age: 87
End: 2022-02-14

## 2022-02-14 NOTE — TELEPHONE ENCOUNTER
Spoke with patient he would like PT order sent to [place] fax [phone]. Order and last office note faxed.  Karissa

## 2022-02-14 NOTE — TELEPHONE ENCOUNTER
Provider:  DONALDO   Caller:   VIVIAN   Relationship to Patient:  SPOUSE     Phone Number: [phone]  Reason for Call:  PT'S WIFE CALLING IN [facility] SHE  SPOKE TO DONALDO ABOUT WANTING  THE  CAROTID SCAN  AND CT OF HEAD SCHEDULED IN [place] . AS OF TODAY PT WAS SCHEDULED IN Replaced by [facility]  FOR CAROTID NOT SCHEDULED FOR CT OF HEAD YET  WIFE WAS GIVEN NUMBER TO [facility] SCHEDULING      WIFE WOULD ALSO LIKE TO SEE ABOUT THE PHYSICAL THERAPY AS SHE RECEIVED A CALL  ABOUT THERAPY AT [facility] THIS PAST Friday  BUT WIFE DOESN'T KNOW IF THIS IS THE RIGHT TYPE OF THERAPY DONALDO WANTED AS THEY HAD DISCUSSED THIS AT LAST VISIT .   When was the patient last seen: 02/10/2022  PLEASE CONTACT PT OR WIFE TO VERIFY LOCATION AND TYPE OF THERAPY IS CORRECT

## 2022-02-14 NOTE — TELEPHONE ENCOUNTER
Spoke with Adriana and he is scheduled for his CT head @ [facility] location on the 21st @ 11am. She states they want the PT to be done @ [facility] [place] and she believes they are already scheduled for this on 2/24/22 @ 3pm, however wants to make sure they were not confusing one CT with the other as when PT [place] location called on Firday they informed he should have nothing to eat 2 hours prior which she felt was odd. Notified maybe this has to do with the Vestibular physical therapy and is a positional thing. As well states today she had cancelled the original CT also on the 24th around 3:30pm as he wanted the CT's in [place] and the PT in [place].    Karissa,  If he is scheduled for PT with [facility] on the 24th would it not show up in the referral or in his chart like every other appt does with [facility]?

## 2022-02-21 ENCOUNTER — HOSPITAL ENCOUNTER (OUTPATIENT)
Dept: CT IMAGING | Facility: HOSPITAL | Age: 87
Discharge: HOME | End: 2022-02-21
Admitting: NURSE PRACTITIONER

## 2022-02-21 DIAGNOSIS — R42 DIZZINESS: ICD-10-CM

## 2022-02-21 LAB — CREAT BLDA-MCNC: 1.1 MG/DL (ref 0.6–1.3)

## 2022-02-21 PROCEDURE — 82565 ASSAY OF CREATININE: CPT

## 2022-02-21 PROCEDURE — 0 IOPAMIDOL PER 1 ML: Performed by: NURSE PRACTITIONER

## 2022-02-21 PROCEDURE — 70470 CT HEAD/BRAIN W/O & W/DYE: CPT

## 2022-02-21 PROCEDURE — 70498 CT ANGIOGRAPHY NECK: CPT

## 2022-02-21 RX ADMIN — IOPAMIDOL 80 ML: 755 INJECTION, SOLUTION INTRAVENOUS at 11:25

## 2022-02-21 NOTE — TELEPHONE ENCOUNTER
SPOKE WITH ADDISON IN THE OFFICE AS PATIENT'S WIFE STATED THE HOSPITAL DID NOT COMPLETE THE CAROTID TEST EVEN THOUGH THE ORDER IS IN THE SYSTEM.    ADVISED HER TO GO BACK INTO THE HOSPITAL AND TELL THEM THE ORDER IS THERE AND IF THEY CAN COMPLETE THE TEST.

## 2022-02-22 ENCOUNTER — TELEPHONE (OUTPATIENT)
Dept: NEUROLOGY | Facility: CLINIC | Age: 87
End: 2022-02-22

## 2022-02-22 NOTE — TELEPHONE ENCOUNTER
----- Message from APRYL Carey sent at 2/22/2022 11:58 AM EST -----  Please let Mr. Miranda know that his CT of the head showed normal age related changes, but was otherwise unremarkable. His CT angiogram showed no evidence of narrowing. Please keep scheduled F/U appointment

## 2022-02-24 ENCOUNTER — APPOINTMENT (OUTPATIENT)
Dept: CT IMAGING | Facility: HOSPITAL | Age: 87
End: 2022-02-24

## 2022-02-28 ENCOUNTER — OUTSIDE FACILITY SERVICE (OUTPATIENT)
Dept: CARDIOLOGY | Facility: CLINIC | Age: 87
End: 2022-02-28

## 2022-02-28 ENCOUNTER — HOSPITAL ENCOUNTER (OUTPATIENT)
Dept: NON INVASIVE DIAGNOSTICS | Facility: HOSPITAL | Age: 87
Discharge: HOME | End: 2022-02-28
Payer: MEDICARE

## 2022-02-28 LAB
LV EF: 55 %
LVEF MODALITY: NORMAL

## 2022-02-28 PROCEDURE — 93306 TTE W/DOPPLER COMPLETE: CPT

## 2022-02-28 PROCEDURE — 93306 TTE W/DOPPLER COMPLETE: CPT | Performed by: INTERNAL MEDICINE

## 2022-03-29 ENCOUNTER — OFFICE VISIT (OUTPATIENT)
Dept: NEUROLOGY | Facility: CLINIC | Age: 87
End: 2022-03-29

## 2022-03-29 VITALS
OXYGEN SATURATION: 97 % | TEMPERATURE: 97.1 F | SYSTOLIC BLOOD PRESSURE: 128 MMHG | HEIGHT: 67 IN | HEART RATE: 71 BPM | BODY MASS INDEX: 26.68 KG/M2 | WEIGHT: 170 LBS | DIASTOLIC BLOOD PRESSURE: 74 MMHG

## 2022-03-29 DIAGNOSIS — R42 DIZZINESS: Primary | ICD-10-CM

## 2022-03-29 PROCEDURE — 99213 OFFICE O/P EST LOW 20 MIN: CPT | Performed by: NURSE PRACTITIONER

## 2022-03-29 NOTE — ASSESSMENT & PLAN NOTE
Likely mixed etiology of vertigo and orthostatic hypotension.     Patient feeling improved with PT. Continue PT     No red flags on imaging     Continue to F/U with Cardiology, may F/U with Neuro as needed

## 2022-03-29 NOTE — PROGRESS NOTES
Subjective:     Patient ID: Sly Miranda is a 86 y.o. male.    CC:   Chief Complaint   Patient presents with   • Dizziness       HPI:   History of Present Illness   86 y.o. male presents as a new patient referred by Dr. Benavidez for dizziness. At last appointment on 2/10/22 ordered CT Head and CTA Carotids. Ordered PT.     CTA Neck 2/21/22: no evidence of hemodynamically significant carotid or vertebral artery stenosis in the neck.     CT Head 2/21/22: age related volume loss and age related changes, otherwise normal.     Has noticed improvement with PT, and being more careful of his movements. Denies falls.    Had an episode of tremor one evening for 2-3 minutes, no syncope associated with this. He was changing positions from sitting to standing back to sitting again.     Otherwise denies new or worsening symptoms     PH:   Has been struggling with dizziness for about 5-7 years. If he turns his head too quickly he will feel like the room gives him a half turn. Will feel dizzy with exertion, such as carrying fire wood. Stands up slowly to make sure he doesn't feel off balance with standing.      Feels no dizziness when lying down or sitting still.      Denies N/V, headaches, weakness, N/T, bowel or bladder difficulties.      Fell a few months ago, had caught his foot on a vanity stool and lost his balance. Otherwise has not had any falls.      August of 2020 had a CT scan of the Brain and his wife states there was nothing concerning on the CT. They do not remember where that was done.      Has a PMH of A Fib on Eliquis, tachy/chloé syndrome with a pacemaker.      Has a pacemaker which is incompatible for MRI.      Medical records reviewed:  7/30/20 ENT record: Dizziness when he stands up, described as light headed and wobbly. Denies spinning sensation. Woozy sensation in that he may fall. ENT showed abnormal VNG. At risk for falls. Also suffers from orthostatic hypotension.       The following portions of the  patient's history were reviewed and updated as appropriate: allergies, current medications, past family history, past medical history, past social history, past surgical history and problem list.    Past Medical History:   Diagnosis Date   • Abdominal pain     left side under ribcage   • Bradycardia     Recorded in 2008 with a history of intolerance to AV mele blocking agents.   • Bruises easily    • Calcified mesenteric mass     Recent CT scan, June 2010, by Dr. Aguilera, unchanged from prior.   • Cataracts, bilateral     removed bilaterally   • Chronic atrial fibrillation (HCC)     on Eliquis 5 mg b.i.d.    • Delayed emergence from anesthesia    • Disease of thyroid gland     hypothyroidism   • Dizziness    • Dyslipidemia     , followed by Dr. Benavidez. on statin therapy.   • Enlarged prostate    • Eye tearing, left    • GERD (gastroesophageal reflux disease)    • Tommy's disease    • H/O seasonal allergies    • Hard stool    • History of shingles 2008   • [place] (hard of hearing)     slight hearing loss   • Hyperlipidemia    • Injury of back due to fall     fell in the [employer] into a boat   • Melanoma (HCC)     right side of face on the chin   • Pacemaker    • Radiation exposure screen     was exposed to the hydrogen bomb   • Shortness of breath    • Snores    • Tattoo     left forearm   • Wears glasses     reading glasses       Past Surgical History:   Procedure Laterality Date   • CARDIAC ELECTROPHYSIOLOGY PROCEDURE N/A 9/10/2018    Procedure: Pacemaker DC new;  Surgeon: Donnell Pineda MD;  Location: [facility] INVASIVE LOC[facility];  Service: Cardiology   • COLONOSCOPY     • EYE SURGERY Bilateral     cataracts removed   • HERNIA REPAIR  10/20/1997    inguinal left side   • JOINT REPLACEMENT Bilateral     partial knee replacement   • KNEE SURGERY Left    • SKIN LESION EXCISION Right 5/20/2019    Procedure: WIDE EXCISION MELANOMA RIGHT FACE;  Surgeon: Dawson Charlton MD;  Location: [facility] OR;  Service: General   •  "TONSILLECTOMY     • TOTAL KNEE ARTHROPLASTY Right 03/06/2014    Right partial total knee replacement       Social History     Socioeconomic History   • Marital status:    Tobacco Use   • Smoking status: Former Smoker     Types: Cigars   • Smokeless tobacco: Never Used   • Tobacco comment: quit over 40 years ago   Vaping Use   • Vaping Use: Never used   Substance and Sexual Activity   • Alcohol use: No   • Drug use: No   • Sexual activity: Defer       Family History   Problem Relation Age of Onset   • Arrhythmia Mother    • Stroke Father    • Heart attack Maternal Grandmother         Objective:  /74   Pulse 71   Temp 97.1 °F (36.2 °C)   Ht 170.2 cm (67.01\")   Wt 77.1 kg (170 lb)   SpO2 97%   BMI 26.62 kg/m²     Neurologic Exam     Mental Status   Oriented to person, place, and time.   Follows 3 step commands.   Attention: normal. Concentration: normal.   Speech: speech is normal   Level of consciousness: alert  Knowledge: good and consistent with education.   Able to name object. Able to read. Able to repeat. Able to write. Normal comprehension.     Cranial Nerves     CN II   Visual fields full to confrontation.   Visual acuity: normal  Right visual field deficit: none  Left visual field deficit: none     CN III, IV, VI   Pupils are equal, round, and reactive to light.  Extraocular motions are normal.   Right pupil: Shape: regular. Reactivity: brisk. Consensual response: intact.   Left pupil: Shape: regular. Reactivity: brisk. Consensual response: intact.   Nystagmus: none   Diplopia: none  Ophthalmoparesis: none  Upgaze: normal  Downgaze: normal  Conjugate gaze: present  Vestibulo-ocular reflex: present    CN V   Facial sensation intact.   Right corneal reflex: normal  Left corneal reflex: normal    CN VII   Right facial weakness: none  Left facial weakness: none    CN VIII   Hearing: intact    CN IX, X   Palate: symmetric  Right gag reflex: normal  Left gag reflex: normal    CN XI   Right " sternocleidomastoid strength: normal  Left sternocleidomastoid strength: normal    CN XII   Tongue: not atrophic  Fasciculations: absent  Tongue deviation: none    Motor Exam   Muscle bulk: normal  Overall muscle tone: normal    Strength   Strength 5/5 throughout.     Sensory Exam   Light touch normal.     Gait, Coordination, and Reflexes     Gait  Gait: shuffling (antalgic )    Tremor   Resting tremor: absent  Intention tremor: absent  Action tremor: absent    Reflexes   Reflexes 2+ except as noted.       Physical Exam  Vitals and nursing note reviewed.   Constitutional:       Appearance: Normal appearance.   HENT:      Head: Normocephalic and atraumatic.   Eyes:      Extraocular Movements: Extraocular movements intact and EOM normal.      Pupils: Pupils are equal, round, and reactive to light.   Skin:     General: Skin is warm and dry.   Neurological:      Mental Status: He is alert and oriented to person, place, and time.      Deep Tendon Reflexes: Strength normal.   Psychiatric:         Mood and Affect: Mood normal.         Speech: Speech normal.         Assessment/Plan:       Diagnoses and all orders for this visit:    1. Dizziness (Primary)  Assessment & Plan:  Likely mixed etiology of vertigo and orthostatic hypotension.     Patient feeling improved with PT. Continue PT     No red flags on imaging     Continue to F/U with Cardiology, may F/U with Neuro as needed                Reviewed medications, potential side effects and signs and symptoms to report. Discussed risk versus benefits of treatment plan with patient and/or family-including medications, labs and radiology that may be ordered. Addressed questions and concerns during visit. Patient and/or family verbalized understanding and agree with plan. Patient instructed to call the office with questions or concerns and report to ED with life-threatening symptoms.     AS THE PROVIDER, I PERSONALLY WORE PPE DURING ENTIRE FACE TO FACE ENCOUNTER IN CLINIC WITH  THE PATIENT. PATIENT ALSO WORE PPE DURING ENTIRE FACE TO FACE ENCOUNTER EXCEPT FOR A MAX OF 30 SECONDS DURING NEUROLOGICAL EVALUATION OF CRANIAL NERVES AND THEN MASK WAS PLACED BACK OVER PATIENT FACE FOR REMAINDER OF VISIT. I WASHED MY HANDS BEFORE AND AFTER VISIT.    During this visit the following were done:  Labs Reviewed []    Labs Ordered []    Radiology Reports Reviewed []    Radiology Ordered []    PCP Records Reviewed []    Referring Provider Records Reviewed []    ER Records Reviewed []    Hospital Records Reviewed []    History Obtained From Family []    Radiology Images Reviewed []    Other Reviewed []    Records Requested []      Return if symptoms worsen or fail to improve.      Carol Moses, APRN  3/29/2022

## 2022-07-06 NOTE — PROGRESS NOTES
[facility] Cardiology    Patient ID: Sly Miranda is a 87 y.o. male.  : 1935   Contact: [phone]    Encounter date: 2022    PCP: Ahmet Benavidez MD      Chief complaint:   Chief Complaint   Patient presents with   • Paroxysmal Atrial Fibrillation       Problem List:  1. Paroxysmal atrial fibrillation:  a. MAXIM/ECV, 10/22/2013, Dr. Huynh: EF 55%. Trace TR, mild MR. Successful ECV to sinus rhythm. Eliquis initiated with propafenone 150 mg q.12 h.   b. EKG, 2013, revealing NSR at a rate of 64 bpm with a normal  QTC.   c. CHADS-VASc=2 (Age>75).  d. MAXIM in preparation for cardioversion, 2013, Dr. Huynh: EF in the lower limits of normal with a large amount of spontaneous contrast and the appearance of loosely formed thrombus in the left atrial appendage. Eliquis initiated.  e. Propafenone discontinued 2013 secondary to ineffectiveness.   f. Echo, 2018: EF 50-55%. Trace-to-mild MR. Mild TR.  g. CT Angio of chest, 2018: no evidence of pulmonary embolus.  h. Holter, 2018: Bradycardia with long pauses.  i. Holter monitor, 2021: Monitored for 5 days. Paced rhythm. First degree AVB. AF 0.1%, AFL 9.1%.  episodes, longest 19 beats @ average 136 bpm up to 163 bpm. PAC 2.6%. PVC 1.1%.  j. Echocardiogram, 2022; EF 55%. Mild MR. Aortic valve is trileaflet and sclerotic. Mild TR.   2. Bradycardia:  a. Recorded in  with a history of intolerance  to AV mele blocking agents.  b. Insertion of [employer] DDDR Pacemaker, 09/10/2018: PM 2272   [id number].  3. Dyslipidemia, on statin therapy.  4. Abdominal pain prompting admission to [facility], 2013:  a.  CT scan of the abdomen and pelvis showing no acute abnormality, 2013.  5. Mesenteric calcified mass:  a. Recent CT scan, 2010,  by Dr. Aguilera, unchanged from prior.  6. Solitary pulmonary nodule:  a. X-ray chest pa and lateral, 2016: Chronic  change; no active disease.  7. GERD.  8. Surgical history:  a. Hernia surgery, 10/20/1997.  b. 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 Diaz.    No Known Allergies    Current Medications:    Current Outpatient Medications:   •  apixaban (Eliquis) 5 MG tablet tablet, Take 1 tablet by mouth Every 12 (Twelve) Hours., Disp: 180 tablet, Rfl: 3  •  Ascorbic Acid (Vitamin C) 500 MG capsule, Take 1,000 mg by mouth Daily., Disp: , Rfl:   •  Calcium-Magnesium-Vitamin D - MG-MG-UNIT tablet sustained-release 24 hour, Take 1 tablet by mouth Daily., Disp: , Rfl:   •  Cholecalciferol (Vitamin D3) 50 MCG (2000 UT) tablet, Take  by mouth Daily., Disp: , Rfl:   •  Coenzyme Q10 (COQ10) 400 MG capsule, Take 400 mg by mouth Daily., Disp: , Rfl:   •  colestipol (COLESTID) 1 G tablet, Take 2 g by mouth Daily., Disp: , Rfl:   •  Cyanocobalamin (VITAMIN B12 PO), Take  by mouth Daily., Disp: , Rfl:   •  dofetilide (TIKOSYN) 500 MCG capsule, Take 1 capsule by mouth 2 (Two) Times a Day., Disp: 180 capsule, Rfl: 1  •  levothyroxine (SYNTHROID, LEVOTHROID) 50 MCG tablet, Take 50 mcg by mouth Daily., Disp: , Rfl:   •  Magnesium Oxide 400 (240 Mg) MG tablet, Take 1 tablet by mouth Daily., Disp: 90 tablet, Rfl: 3  •  metoprolol tartrate (LOPRESSOR) 50 MG tablet, Take 1 tablet by mouth 2 (Two) Times a Day., Disp: 180 tablet, Rfl: 3  •  omeprazole (PriLOSEC) 40 MG capsule, Take 40 mg by mouth Daily., Disp: , Rfl:   •  rosuvastatin (CRESTOR) 20 MG tablet, Take 1 tablet by mouth Daily., Disp: 90 tablet, Rfl: 1  •  silodosin (RAPAFLO) 8 MG capsule capsule, Take 8 mg by mouth Daily With Breakfast., Disp: , Rfl:   •  triamcinolone (KENALOG) 0.1 % cream, Apply  topically to the appropriate area as directed 2 (Two) Times a Day., Disp: , Rfl:   •  Zinc 50 MG capsule, Take 50 mg by mouth Daily., Disp: , Rfl:     HPI    Sly Miranda is a 87 y.o. male who presents today for a follow up of paroxysmal atrial fibrillation,  "tachy-chloé syndrome, and cardiac risk factors. Since last visit, patient states that he is still experiencing dizziness. He states his ankles do swell in the mornings and he tries to watch his salt intake. He recently visited neurology and states they didn't really give an answer and just put him on medication. He does have to wait a minute to walk he does stand up. Patient maintains a stable activity level by mowing his yard. Patient denies chest pain, shortness of breath, orthopnea, palpitations, edema, and syncope.      The following portions of the patient's history were reviewed and updated as appropriate: allergies, current medications and problem list.    Pertinent positives as listed in the HPI.  All other systems reviewed are negative.         Vitals:    07/07/22 1152   BP: 108/52   BP Location: Right arm   Patient Position: Sitting   Pulse: 71   SpO2: 96%   Weight: 76.7 kg (169 lb)   Height: 170.2 cm (67\")       Physical Exam:  General: Alert and oriented.  Neck: Jugular venous pressure is within normal limits. Carotids have normal upstrokes without bruits.   Cardiovascular: Heart has a nondisplaced focal PMI. Regular rate and rhythm. No murmur, gallop or rub.  Lungs: Clear, no rales or wheezes. Equal expansion is noted.   Extremities: Show no edema.  Skin: Warm and dry.  Neurologic: Nonfocal.     Diagnostic Data (reviewed with patient):    Lab date: 02/11/2022  • CMP: Glu 107, BUN 14, Creat 1.09, eGFR 61, Na 138, K 4.4, Cl 102, CO2 28, Ca 9.7, Alk Phos 76, AST 19, ALT 23  • CBC: WBC 7.1, RBC 5.11, HGB 15.4, HCT 43.6, MCV 85.3, MCH 30.1,   • HbA1c: 5.8    5/16/22:  LDL 80, HDL 39    Lab Results   Component Value Date    CREATININE 1.10 02/21/2022               ECG 12 Lead    Date/Time: 7/7/2022 12:23 PM  Performed by: Ana Huynh MD  Authorized by: Ana Huynh MD   Comparison: compared with previous ECG from 9/25/2021  Comparison to previous ECG: Accelerate AV junction with " aberrent ventricular conduction on previous ECG  Rhythm: paced  Rhythm comments: with prolonged AV conduction  BPM: 70  Conduction: incomplete left bundle branch block  Other findings: non-specific ST-T wave changes    Clinical impression: abnormal EKG            DEVICE INTERROGATION: [employer] /2016, PPM: RA pacing 95%, RV pacing 32%. P wave is none @ 40 with a threshold of 0.62 V at 0.5 msec and an impedance of 350 ohms. R wave is 9.5 mV with a threshold of 0.5 V at 0.5 msec and an impedance of 810 ohms. Battery voltage is 9 years. Events: AMS 1.5%. Longest 2 hours 43 minutes. Atrial noise.          Assessment:    ICD-10-CM ICD-9-CM   1. Paroxysmal atrial fibrillation (HCC)  I48.0 427.31   2. Tachy-chloé syndrome (HCC)  I49.5 427.81   3. Pacemaker  Z95.0 V45.01   4. Hyperlipidemia LDL goal <100  E78.5 272.4         Plan:  1. Decrease metoprolol from 50 mg BID to 25 mg BID due to decreased blood pressure and possible orthostasis.   2. We will obtain lab results from PCP.   3. Continue on Eliquis 5 mg BID for stroke prophylaxis.    4. Continue on rosuvastatin 20 mg daily for hyperlipidemia.    5. Continue all other current medications.  6. F/up in 12 months, sooner if needed.    Scribed for Ana Huynh MD by Nallely Keita. 7/7/2022 12:18 EDT       I Ana Huynh MD personally performed the services described in this documentation as scribed by the above individual in my presence, and it is both accurate and complete.    Ana Huynh MD, [facility]

## 2022-07-07 ENCOUNTER — HOSPITAL ENCOUNTER (OUTPATIENT)
Facility: HOSPITAL | Age: 87
Discharge: HOME | End: 2022-07-07
Payer: MEDICARE

## 2022-07-07 ENCOUNTER — OFFICE VISIT (OUTPATIENT)
Dept: CARDIOLOGY | Facility: CLINIC | Age: 87
End: 2022-07-07

## 2022-07-07 VITALS
OXYGEN SATURATION: 96 % | HEIGHT: 67 IN | HEART RATE: 71 BPM | DIASTOLIC BLOOD PRESSURE: 52 MMHG | WEIGHT: 169 LBS | SYSTOLIC BLOOD PRESSURE: 108 MMHG | BODY MASS INDEX: 26.53 KG/M2

## 2022-07-07 DIAGNOSIS — I49.5 TACHY-BRADY SYNDROME: ICD-10-CM

## 2022-07-07 DIAGNOSIS — Z95.0 PACEMAKER: ICD-10-CM

## 2022-07-07 DIAGNOSIS — E78.5 HYPERLIPIDEMIA LDL GOAL <100: ICD-10-CM

## 2022-07-07 DIAGNOSIS — I48.0 PAROXYSMAL ATRIAL FIBRILLATION: Primary | ICD-10-CM

## 2022-07-07 PROCEDURE — 99214 OFFICE O/P EST MOD 30 MIN: CPT | Performed by: INTERNAL MEDICINE

## 2022-07-07 PROCEDURE — 93280 PM DEVICE PROGR EVAL DUAL: CPT | Performed by: INTERNAL MEDICINE

## 2022-07-07 PROCEDURE — 93005 ELECTROCARDIOGRAM TRACING: CPT

## 2022-07-07 PROCEDURE — 93000 ELECTROCARDIOGRAM COMPLETE: CPT | Performed by: INTERNAL MEDICINE

## 2022-07-07 RX ORDER — CHOLECALCIFEROL (VITAMIN D3) 125 MCG
CAPSULE ORAL DAILY
COMMUNITY

## 2022-07-07 RX ORDER — MULTIVIT-MIN/IRON/FOLIC ACID/K 18-600-40
1000 CAPSULE ORAL DAILY
COMMUNITY

## 2022-07-19 RX ORDER — DOFETILIDE 0.5 MG/1
CAPSULE ORAL
Qty: 180 CAPSULE | Refills: 2 | Status: SHIPPED | OUTPATIENT
Start: 2022-07-19 | End: 2022-10-24 | Stop reason: SDUPTHER

## 2022-08-31 RX ORDER — APIXABAN 5 MG/1
TABLET, FILM COATED ORAL
Qty: 180 TABLET | Refills: 2 | Status: SHIPPED | OUTPATIENT
Start: 2022-08-31

## 2022-10-24 RX ORDER — DOFETILIDE 0.5 MG/1
500 CAPSULE ORAL 2 TIMES DAILY
Qty: 180 CAPSULE | Refills: 2 | Status: SHIPPED | OUTPATIENT
Start: 2022-10-24

## 2022-10-25 ENCOUNTER — OFFICE VISIT (OUTPATIENT)
Dept: UROLOGY | Facility: CLINIC | Age: 87
End: 2022-10-25

## 2022-10-25 VITALS
RESPIRATION RATE: 12 BRPM | OXYGEN SATURATION: 96 % | SYSTOLIC BLOOD PRESSURE: 92 MMHG | DIASTOLIC BLOOD PRESSURE: 64 MMHG | TEMPERATURE: 96.7 F | HEART RATE: 70 BPM

## 2022-10-25 DIAGNOSIS — N13.8 BPH WITH OBSTRUCTION/LOWER URINARY TRACT SYMPTOMS: Primary | ICD-10-CM

## 2022-10-25 DIAGNOSIS — N40.1 BPH WITH OBSTRUCTION/LOWER URINARY TRACT SYMPTOMS: Primary | ICD-10-CM

## 2022-10-25 LAB
BILIRUB BLD-MCNC: NEGATIVE MG/DL
CLARITY, POC: CLEAR
COLOR UR: YELLOW
EXPIRATION DATE: NORMAL
GLUCOSE UR STRIP-MCNC: NEGATIVE MG/DL
KETONES UR QL: NEGATIVE
LEUKOCYTE EST, POC: NEGATIVE
Lab: NORMAL
NITRITE UR-MCNC: NEGATIVE MG/ML
PH UR: 6 [PH] (ref 5–8)
PROT UR STRIP-MCNC: NEGATIVE MG/DL
RBC # UR STRIP: NEGATIVE /UL
SP GR UR: 1.01 (ref 1–1.03)
UROBILINOGEN UR QL: NORMAL

## 2022-10-25 PROCEDURE — 81003 URINALYSIS AUTO W/O SCOPE: CPT | Performed by: NURSE PRACTITIONER

## 2022-10-25 PROCEDURE — 99213 OFFICE O/P EST LOW 20 MIN: CPT | Performed by: NURSE PRACTITIONER

## 2022-10-25 PROCEDURE — 51798 US URINE CAPACITY MEASURE: CPT | Performed by: NURSE PRACTITIONER

## 2022-10-25 NOTE — PROGRESS NOTES
Office Visit Males LUTS      Patient Name: Sly Miranda  : 1935   MRN: [id number]     Chief Complaint:   Chief Complaint   Patient presents with   • Benign Prostatic Hypertrophy   • Establish Care   • LUTS       Referring Provider: Ahmet Benavidez*    History of Present Illness: Sly Miranda is a 87 y.o. male who presents today with history of BPH who presents with LUTS.  Patient in the past saw Dr. Villalobos who wanted to do a procedure on his prostate and PCP did not recommend this.  He is referred by PCP here today due to continued LUTS while taking sildosin  Primary symptom includes: Frequency, weak stream and nocturia.  He also reports recent hesitancy intermittent stream and incomplete emptying    Onset was several years.    Previous treatments include: Silodosin    IPSS Questionnaire (AUA-7):  Incomplete emptying  Over the past month, how often have you had a sensation of not emptying your bladder completely after you finish?: About half the time (10/25/22 1057)  Frequency  Over the past month, how often have you had to urinate again less than two hours after you finishing urinating ?: More than half the time (10/25/22 1057)  Intermittency  Over the past month, how often have you found you stopped and started again several time when you urinated ?: About half the time (10/25/22 1057)  Urgency  Over the last month, how difficult  have you found it to postpone urination ?: About half the time (10/25/22 1057)  Weak Stream  Over the past month, how often have you had a weak urinary stream ?: More than half the time (10/25/22 1057)  Straining  Over the past month, how often have you had to push or strain to begin urination ?: More than half the time (10/25/22 1057)  Nocturia  Over the past month, how many times did you most typically get up to urinate from the time you went to bed until the time you got up in the morning ?: Almost always (10/25/22 1057)  Quality of life due to urinary  symptoms  If you were to spend the rest of your life with your urinary condition the way it is now, how would feel about that?: Mostly dissatified (10/25/22 1057)    Scores  Total IPSS Score: 26 (10/25/22 1057)  Total Score = Symtomatic Level: severely symptomatic: 20-35 (10/25/22 1057)       Subjective      Review of System:   Constitutional: No fevers or chills  Genitourinary: LUTS    Past Medical History:   Past Medical History:   Diagnosis Date   • Abdominal pain     left side under ribcage   • Bradycardia     Recorded in 2008 with a history of intolerance to AV mele blocking agents.   • Bruises easily    • Calcified mesenteric mass     Recent CT scan, June 2010, by Dr. Aguilera, unchanged from prior.   • Cataracts, bilateral     removed bilaterally   • Chronic atrial fibrillation (HCC)     on Eliquis 5 mg b.i.d.    • Delayed emergence from anesthesia    • Disease of thyroid gland     hypothyroidism   • Dizziness    • Dyslipidemia     , followed by Dr. Benavidez. on statin therapy.   • Enlarged prostate    • Eye tearing, left    • GERD (gastroesophageal reflux disease)    • Tommy's disease    • H/O seasonal allergies    • Hard stool    • History of shingles 2008   • [place] (hard of hearing)     slight hearing loss   • Hyperlipidemia    • Injury of back due to fall     fell in the [employer] into a boat   • Melanoma (HCC)     right side of face on the chin   • Pacemaker    • Radiation exposure screen     was exposed to the hydrogen bomb   • Shortness of breath    • Snores    • Tattoo     left forearm   • Wears glasses     reading glasses       Past Surgical History:   Past Surgical History:   Procedure Laterality Date   • CARDIAC ELECTROPHYSIOLOGY PROCEDURE N/A 9/10/2018    Procedure: Pacemaker DC new;  Surgeon: Donnell Pineda MD;  Location: [facility] INVASIVE LOCATION;  Service: Cardiology   • COLONOSCOPY     • EYE SURGERY Bilateral     cataracts removed   • HERNIA REPAIR  10/20/1997    inguinal left side   • JOINT  REPLACEMENT Bilateral     partial knee replacement   • KNEE SURGERY Left    • SKIN LESION EXCISION Right 5/20/2019    Procedure: WIDE EXCISION MELANOMA RIGHT FACE;  Surgeon: Dawson Charlton MD;  Location: [facility];  Service: General   • TONSILLECTOMY     • TOTAL KNEE ARTHROPLASTY Right 03/06/2014    Right partial total knee replacement       Family History:   Family History   Problem Relation Age of Onset   • Arrhythmia Mother    • Stroke Father    • Heart attack Maternal Grandmother        Social History:   Social History     Socioeconomic History   • Marital status:    Tobacco Use   • Smoking status: Former     Types: Cigars   • Smokeless tobacco: Never   • Tobacco comments:     quit over 40 years ago   Vaping Use   • Vaping Use: Never used   Substance and Sexual Activity   • Alcohol use: No   • Drug use: No   • Sexual activity: Defer       Medications:     Current Outpatient Medications:   •  Ascorbic Acid (Vitamin C) 500 MG capsule, Take 1,000 mg by mouth Daily., Disp: , Rfl:   •  Calcium-Magnesium-Vitamin D - MG-MG-UNIT tablet sustained-release 24 hour, Take 1 tablet by mouth Daily., Disp: , Rfl:   •  Cholecalciferol (Vitamin D3) 50 MCG (2000 UT) tablet, Take  by mouth Daily., Disp: , Rfl:   •  Coenzyme Q10 (COQ10) 400 MG capsule, Take 400 mg by mouth Daily., Disp: , Rfl:   •  colestipol (COLESTID) 1 G tablet, Take 2 g by mouth Daily., Disp: , Rfl:   •  Cyanocobalamin (VITAMIN B12 PO), Take  by mouth Daily., Disp: , Rfl:   •  dofetilide (TIKOSYN) 500 MCG capsule, Take 1 capsule by mouth 2 (Two) Times a Day., Disp: 180 capsule, Rfl: 2  •  Eliquis 5 MG tablet tablet, TAKE 1 TABLET EVERY 12     HOURS, Disp: 180 tablet, Rfl: 2  •  levothyroxine (SYNTHROID, LEVOTHROID) 50 MCG tablet, Take 50 mcg by mouth Daily., Disp: , Rfl:   •  Magnesium Oxide 400 (240 Mg) MG tablet, Take 1 tablet by mouth Daily., Disp: 90 tablet, Rfl: 3  •  metoprolol tartrate (LOPRESSOR) 25 MG tablet, Take 1 tablet by mouth  2 (Two) Times a Day., Disp: 180 tablet, Rfl: 3  •  omeprazole (PriLOSEC) 40 MG capsule, Take 40 mg by mouth Daily., Disp: , Rfl:   •  rosuvastatin (CRESTOR) 20 MG tablet, Take 1 tablet by mouth Daily., Disp: 90 tablet, Rfl: 1  •  silodosin (RAPAFLO) 8 MG capsule capsule, Take 8 mg by mouth Daily With Breakfast., Disp: , Rfl:   •  triamcinolone (KENALOG) 0.1 % cream, Apply  topically to the appropriate area as directed 2 (Two) Times a Day., Disp: , Rfl:   •  Zinc 50 MG capsule, Take 50 mg by mouth Daily., Disp: , Rfl:     Allergies:   No Known Allergies    Objective     Physical Exam:   Vital Signs:   Vitals:    10/25/22 1055   BP: 92/64   BP Location: Left arm   Patient Position: Sitting   Cuff Size: Adult   Pulse: 70   Resp: 12   Temp: 96.7 °F (35.9 °C)   TempSrc: Temporal   SpO2: 96%     There is no height or weight on file to calculate BMI.     Constitutional: NAD, WDWN.   Neurological: A + O x 3.    Skin: Pink, warm and dry.  Psych: Normal mood and affect       Labs  Lab Results   Component Value Date    PSA 2.10 03/28/2016    PSA 2.26 12/08/2015    PSA 2.00 06/02/2014       Brief Urine Lab Results  (Last result in the past 365 days)      Color   Clarity   Blood   Leuk Est   Nitrite   Protein   CREAT   Urine HCG        10/25/22 1105 Yellow   Clear   Negative   Negative   Negative   Negative                 PVR  Post-void residual performed by staff -0 mL      Assessment / Plan      Assessment  Mr. Miranda is a 87 y.o. male with A. fib taking Eliquis who presents with LUTS, primarily outlet obstruction with symptoms of incomplete emptying frequency intermittency urgency weak stream and nocturia.  IPSS score is 22 presenting severe symptoms.  Patient is interested in UroLift to treat symptoms.  We discussed procedure in depth and work-up for BPH including cystoscopy, TRUS, and uroflow.  Patient wishes to proceed with BPH work-up information given for UroLift along with BPH pamphlet.  We discussed work-up will help  Dr. Alfaro determine appropriate procedure for patient's enlarged prostate.    Plan  1.  Follow up for cystoscopy, TRUS, Uroflow, IZA, GE      Follow Up:   Return for Dr. Alfaro cystoscopy, TRUS, and uroflo.    APRYL Perez  [facility] [place] [facility]

## 2022-10-31 PROCEDURE — 93294 REM INTERROG EVL PM/LDLS PM: CPT | Performed by: INTERNAL MEDICINE

## 2022-10-31 PROCEDURE — 93296 REM INTERROG EVL PM/IDS: CPT | Performed by: INTERNAL MEDICINE

## 2023-02-13 ENCOUNTER — PROCEDURE VISIT (OUTPATIENT)
Dept: UROLOGY | Facility: CLINIC | Age: 88
End: 2023-02-13
Payer: MEDICARE

## 2023-02-13 DIAGNOSIS — N13.8 BPH WITH OBSTRUCTION/LOWER URINARY TRACT SYMPTOMS: Primary | ICD-10-CM

## 2023-02-13 DIAGNOSIS — N40.1 BPH WITH OBSTRUCTION/LOWER URINARY TRACT SYMPTOMS: Primary | ICD-10-CM

## 2023-02-13 DIAGNOSIS — R42 DIZZINESS: ICD-10-CM

## 2023-02-13 PROCEDURE — 76872 US TRANSRECTAL: CPT | Performed by: UROLOGY

## 2023-02-13 PROCEDURE — 99214 OFFICE O/P EST MOD 30 MIN: CPT | Performed by: UROLOGY

## 2023-02-13 PROCEDURE — 52000 CYSTOURETHROSCOPY: CPT | Performed by: UROLOGY

## 2023-02-13 RX ORDER — PHENAZOPYRIDINE HYDROCHLORIDE 100 MG/1
100 TABLET, FILM COATED ORAL 3 TIMES DAILY PRN
Qty: 30 TABLET | Refills: 0 | Status: SHIPPED | OUTPATIENT
Start: 2023-02-13 | End: 2023-02-16

## 2023-02-13 RX ORDER — CEFDINIR 300 MG/1
300 CAPSULE ORAL 2 TIMES DAILY
Qty: 6 CAPSULE | Refills: 0 | Status: SHIPPED | OUTPATIENT
Start: 2023-02-13 | End: 2023-04-04

## 2023-02-13 RX ORDER — ACETAMINOPHEN 325 MG/1
650 TABLET ORAL EVERY 6 HOURS
Qty: 32 TABLET | Refills: 0 | Status: SHIPPED | OUTPATIENT
Start: 2023-02-13 | End: 2023-02-17

## 2023-02-13 NOTE — PROGRESS NOTES
CC  LUTS / BPH Workup    HPI  Ms. Miranda is a 87 y.o. male with history below in assessment, who presents for follow up.     At this visit patient is here for BPH workup and discussion.     Past Medical History:   Diagnosis Date   • Abdominal pain     left side under ribcage   • Bradycardia     Recorded in 2008 with a history of intolerance to AV mele blocking agents.   • Bruises easily    • Calcified mesenteric mass     Recent CT scan, June 2010, by Dr. Aguilera, unchanged from prior.   • Cataracts, bilateral     removed bilaterally   • Chronic atrial fibrillation (HCC)     on Eliquis 5 mg b.i.d.    • Delayed emergence from anesthesia    • Disease of thyroid gland     hypothyroidism   • Dizziness    • Dyslipidemia     , followed by Dr. Benavidez. on statin therapy.   • Enlarged prostate    • Eye tearing, left    • GERD (gastroesophageal reflux disease)    • [place]'s disease    • H/O seasonal allergies    • Hard stool    • History of shingles 2008   • [place] (hard of hearing)     slight hearing loss   • Hyperlipidemia    • Injury of back due to fall     fell in the [employer] into a boat   • Melanoma (HCC)     right side of face on the chin   • Pacemaker    • Radiation exposure screen     was exposed to the hydrogen bomb   • Shortness of breath    • Snores    • Tattoo     left forearm   • Wears glasses     reading glasses       Past Surgical History:   Procedure Laterality Date   • CARDIAC ELECTROPHYSIOLOGY PROCEDURE N/A 9/10/2018    Procedure: Pacemaker DC new;  Surgeon: Donnell Pineda MD;  Location: [facility] INVASIVE LOCATION;  Service: Cardiology   • COLONOSCOPY     • EYE SURGERY Bilateral     cataracts removed   • HERNIA REPAIR  10/20/1997    inguinal left side   • JOINT REPLACEMENT Bilateral     partial knee replacement   • KNEE SURGERY Left    • SKIN LESION EXCISION Right 5/20/2019    Procedure: WIDE EXCISION MELANOMA RIGHT FACE;  Surgeon: Dawson Charlton MD;  Location: [facility];  Service: General   •  TONSILLECTOMY     • TOTAL KNEE ARTHROPLASTY Right 03/06/2014    Right partial total knee replacement         Current Outpatient Medications:   •  Ascorbic Acid (Vitamin C) 500 MG capsule, Take 1,000 mg by mouth Daily., Disp: , Rfl:   •  Calcium-Magnesium-Vitamin D - MG-MG-UNIT tablet sustained-release 24 hour, Take 1 tablet by mouth Daily., Disp: , Rfl:   •  Cholecalciferol (Vitamin D3) 50 MCG (2000 UT) tablet, Take  by mouth Daily., Disp: , Rfl:   •  Coenzyme Q10 (COQ10) 400 MG capsule, Take 400 mg by mouth Daily., Disp: , Rfl:   •  colestipol (COLESTID) 1 G tablet, Take 2 g by mouth Daily., Disp: , Rfl:   •  Cyanocobalamin (VITAMIN B12 PO), Take  by mouth Daily., Disp: , Rfl:   •  dofetilide (TIKOSYN) 500 MCG capsule, Take 1 capsule by mouth 2 (Two) Times a Day., Disp: 180 capsule, Rfl: 2  •  Eliquis 5 MG tablet tablet, TAKE 1 TABLET EVERY 12     HOURS, Disp: 180 tablet, Rfl: 2  •  levothyroxine (SYNTHROID, LEVOTHROID) 50 MCG tablet, Take 50 mcg by mouth Daily., Disp: , Rfl:   •  Magnesium Oxide 400 (240 Mg) MG tablet, Take 1 tablet by mouth Daily., Disp: 90 tablet, Rfl: 3  •  metoprolol tartrate (LOPRESSOR) 25 MG tablet, Take 1 tablet by mouth 2 (Two) Times a Day., Disp: 180 tablet, Rfl: 3  •  omeprazole (PriLOSEC) 40 MG capsule, Take 40 mg by mouth Daily., Disp: , Rfl:   •  rosuvastatin (CRESTOR) 20 MG tablet, Take 1 tablet by mouth Daily., Disp: 90 tablet, Rfl: 1  •  silodosin (RAPAFLO) 8 MG capsule capsule, Take 8 mg by mouth Daily With Breakfast., Disp: , Rfl:   •  triamcinolone (KENALOG) 0.1 % cream, Apply  topically to the appropriate area as directed 2 (Two) Times a Day., Disp: , Rfl:   •  Zinc 50 MG capsule, Take 50 mg by mouth Daily., Disp: , Rfl:      Physical Exam  There were no vitals taken for this visit.    Labs  Brief Urine Lab Results  (Last result in the past 365 days)      Color   Clarity   Blood   Leuk Est   Nitrite   Protein   CREAT   Urine HCG        10/25/22 1105 Yellow   Clear    Negative   Negative   Negative   Negative                 Lab Results   Component Value Date    GLUCOSE 95 09/11/2018    CALCIUM 8.4 (L) 09/11/2018     09/11/2018    K 4.0 09/11/2018    CO2 26.0 09/11/2018     09/11/2018    BUN 19 09/11/2018    CREATININE 1.10 02/21/2022    EGFRIFAFRI >59 03/07/2018    EGFRIFNONA 67 09/11/2018    BCR 17.9 09/11/2018    ANIONGAP 8.0 09/11/2018       Lab Results   Component Value Date    WBC 8.3 09/24/2021    HGB 15.2 09/24/2021    HCT 46.7 09/24/2021    MCV 87.9 09/24/2021     09/24/2021            Lab Results   Component Value Date    PSA 2.10 03/28/2016    PSA 2.26 12/08/2015    PSA 2.00 06/02/2014       Radiographic Studies  No Images in the past 120 days found..    I have reviewed above labs and imaging.     • CYSTOSCOPY  Preprocedure diagnosis  LUTS  Postprocedure diagnosis  Same  Procedure  Flexible Cystourethroscopy  Attending surgeon  Andrew Alfaro MD  Anesthesia  2% lidocaine jelly intraurethrally  Complications  None  Indications  87 y.o. male undergoing a flexible cystoscopy for the above mentioned indications.  Informed consent was obtained.    Findings  Cystoscopic findings included one right and left ureteral orifice in the normal anatomic position with normal bladder mucosa and no tumors, masses or stones. The urethral urothelium was within normal limits with no strictures.  There was not a prominent median lobe.  The lateral lobes were obstructive in appearance.    Procedure  The patient was placed in supine position and prepped and draped in sterile fashion with lidocaine jelly per urethra for anesthesia.  A timeout was performed.  The 14F flexible cystoscope was lubricated and gently placed through the penile urethra and into the bladder.  The bladder was completely visualized.  The cystoscope was retroflexed and the bladder neck and prostate visualized.  The cystoscope was slowly withdrawn while visualizing the urethra and the procedure  terminated.  The patient tolerated the procedure well.      • TRUS OF PROSTATE   Preoperative diagnosis  LUTS  Postoperative diagnosis  Same  Procedure  1.  Transrectal ultrasound of the prostate  Attending Surgeon  Andrew Alfaro MD  Anesthesia  2% lidocaine jelly, intrarectal instillation, 10mL  Complications  None  Specimen  None  Indications  Mr. Miranda is a 87 y.o. male with LUTS.  He presents for prostate ultrasound to evaluate prostate size.  Procedure  The patient was positioned and prepped in a left lateral position with lower extremities flexed.  Lidocaine jelly, 2%, was injected per rectum. A digital rectal exam was performed which demonstrated a smooth prostate without nodules or induration. The 8[employer] E8CS rectal ultrasound probe was slowly introduced into the rectum without difficulty.  The prostate and seminal vesicles were inspected systematically using cross and sagittal views with the ultrasound. The dimensions of the prostate were measured, for a calculated volume of 47 mL with 4.5 cm prostatic width.  The seminal vesicles appeared normal.  The rectal ultrasound probe was removed.  The patient tolerated the procedure well.    • UROFLOW  Peak flow rate -8.3 mL/sec  Average flow rate -3.8 mL/sec  Flow curve -long low flat  Voided volume -295 mL    I personally reviewed  and interpreted this study.       Assessment  87 y.o. male with worsening of chronic lower urinary tract symptoms.     In a separate room and encounter after his workup, we discussed continued alpha-blocker therapy versus Urolift. He gets dizzy often, which is a known side effect of an alpha-blocker, especially when it is mixed with some of his other cardiac meds.  We discussed the risk, benefits, and alternatives to UroLift.  Informed consent was obtained.    Plan  1. Schedule for Urolift at [place].  The goal will be to get him off of the Rapaflo 2 to 4 weeks after Urolift.  2. Stop eliquis 3d prior. Get cardiac approval from   [facility]

## 2023-02-13 NOTE — PATIENT INSTRUCTIONS
Dr. Alfaro's Preoperative Instructions Before and After UroLift Procedure at the surgery center  The following instructions will help you care for yourself, or be cared for before and after your procedure.      Diet  Drink plenty of liquids and eat light meals after the procedure.  It is very important to treat plenty of fluids  after your procedure.   Do not eat or drink anything after midnight the night before your procedure.    Anesthesia Precautions & Expectations  You will receive a twilight sedation anesthetic.  After anesthesia, rest for 24 hours.    Do not drive, drink alcoholic beverages or make any important decisions during this time.  You will need someone to drive you the day of your procedure.  Please take the medications 1 hour prior to your procedure.      What to take before the procedure  We will give you an antibiotic to be taken one hour ahead of time that day and for the next 3 days.   We will also give you an anti-inflammatory medication and a medication for urinary burning to be taken starting that day and for the next few days.     Take all the medicines 1 hour before your procedure.    Activity  Start normal activities in twenty-four (24) hours.    Wound Care and Hygiene  No restrictions, start normal routine.    What to Expect after Surgery  Mild pain with voiding.  Frequency or urgency - expect these to occur for 2-4 weeks after surgery. Call if these are severe and we will give a medication to help with them.  Bladder cramps.  Minimal bleeding with voiding.    Call your Doctor  Passing clots in urine preventing bladder emptying  Severe pain not controlled by oral medication  Temperature above 101.5 degrees  Inability to urinate within eight (8) hours after surgery    Stop eliquis 3 days prior to procedure    Other Contacts  Urology Office:  [id number]   [place] [address]  [phone] office  [phone] fax

## 2023-02-22 ENCOUNTER — TELEPHONE (OUTPATIENT)
Dept: CARDIOLOGY | Facility: CLINIC | Age: 88
End: 2023-02-22
Payer: MEDICARE

## 2023-02-22 NOTE — TELEPHONE ENCOUNTER
Pt's spouse called to report they've discontinued their land line telephone service. Pt's spouse states they do have a cell adapter.     Assisted Mrs. Miranda with cell adapter set up then assisted w/manual remote [employer] transmission.    Transmission not received. I called Mrs. Miranda and she reports the roof of their home is tin. Advised Mrs. Miranda to call [employer] customer service for advanced trouble shooting and requested that she call the device clinic back afterwards for an update.

## 2023-02-22 NOTE — TELEPHONE ENCOUNTER
Mrs. Miranda called back and reported the current cell adapter is a 3G. [employer] customer service is sending a 4G to the pt.

## 2023-03-07 ENCOUNTER — OUTSIDE FACILITY SERVICE (OUTPATIENT)
Dept: UROLOGY | Facility: CLINIC | Age: 88
End: 2023-03-07
Payer: MEDICARE

## 2023-03-07 PROCEDURE — 52442 CYSTO INS TRNSPRSTC IMPLT EA: CPT | Performed by: UROLOGY

## 2023-03-07 PROCEDURE — 52441 CYSTO INSJ TRNSPRSTC 1 IMPLT: CPT | Performed by: UROLOGY

## 2023-04-04 ENCOUNTER — OFFICE VISIT (OUTPATIENT)
Dept: UROLOGY | Facility: CLINIC | Age: 88
End: 2023-04-04
Payer: MEDICARE

## 2023-04-04 VITALS
SYSTOLIC BLOOD PRESSURE: 118 MMHG | DIASTOLIC BLOOD PRESSURE: 64 MMHG | HEIGHT: 67 IN | BODY MASS INDEX: 26.68 KG/M2 | TEMPERATURE: 97.1 F | WEIGHT: 170 LBS | OXYGEN SATURATION: 96 % | HEART RATE: 71 BPM

## 2023-04-04 DIAGNOSIS — N40.0 BENIGN PROSTATIC HYPERPLASIA, UNSPECIFIED WHETHER LOWER URINARY TRACT SYMPTOMS PRESENT: ICD-10-CM

## 2023-04-04 PROCEDURE — 51798 US URINE CAPACITY MEASURE: CPT | Performed by: PHYSICIAN ASSISTANT

## 2023-04-04 PROCEDURE — 99214 OFFICE O/P EST MOD 30 MIN: CPT | Performed by: PHYSICIAN ASSISTANT

## 2023-04-04 PROCEDURE — 1160F RVW MEDS BY RX/DR IN RCRD: CPT | Performed by: PHYSICIAN ASSISTANT

## 2023-04-04 PROCEDURE — 1159F MED LIST DOCD IN RCRD: CPT | Performed by: PHYSICIAN ASSISTANT

## 2023-04-04 RX ORDER — CEPHALEXIN 500 MG/1
CAPSULE ORAL
COMMUNITY
Start: 2023-03-13 | End: 2023-04-04

## 2023-04-04 NOTE — PROGRESS NOTES
Chief Complaint   Patient presents with   • Benign Prostatic Hypertrophy   • Follow-up        HPI  Mr. Miranda is a 87 y.o. male with history of BPH s/p Urolift 03/07/23 who presents for follow up.     At this visit, has had intermittent improvement in stream strength. He continues to struggle with frequency, urgency, and nocturia. He admits Dr. Benavidez once treated him with myrbetriq, but he stopped it due to unknown side effect (doesn't remember). He has stopped his Rapaflo.    IPSS Questionnaire (AUA-7):  Incomplete emptying  Over the past month, how often have you had a sensation of not emptying your bladder completely after you finish?: About half the time (04/04/23 0931)  Frequency  Over the past month, how often have you had to urinate again less than two hours after you finishing urinating ?: More than half the time (04/04/23 0931)  Intermittency  Over the past month, how often have you found you stopped and started again several time when you urinated ?: About half the time (04/04/23 0931)  Urgency  Over the last month, how difficult  have you found it to postpone urination ?: About half the time (04/04/23 0931)  Weak Stream  Over the past month, how often have you had a weak urinary stream ?: About half the time (04/04/23 0931)  Straining  Over the past month, how often have you had to push or strain to begin urination ?: About half the time (04/04/23 0931)  Nocturia  Over the past month, how many times did you most typically get up to urinate from the time you went to bed until the time you got up in the morning ?: Not at all (04/04/23 0931)  Quality of life due to urinary symptoms  If you were to spend the rest of your life with your urinary condition the way it is now, how would feel about that?: Unhappy (04/04/23 0931)    Scores  Total IPSS Score: 19 (04/04/23 0931)  Total Score = Symtomatic Level: severely symptomatic: 20-35 (04/04/23 0931)       Past Medical History:   Diagnosis Date   • Bradycardia      Recorded in 2008 with a history of intolerance to AV mele blocking agents.   • Calcified mesenteric mass     Recent CT scan, June 2010, by Dr. Aguilera, unchanged from prior.   • Cataracts, bilateral     removed bilaterally   • Chronic atrial fibrillation     on Eliquis 5 mg b.i.d.    • Delayed emergence from anesthesia    • Disease of thyroid gland     hypothyroidism   • Dizziness    • Dyslipidemia     , followed by Dr. Benavidez. on statin therapy.   • Enlarged prostate    • Eye tearing, left    • GERD (gastroesophageal reflux disease)    • Tommy's disease    • Hard stool    • History of shingles 2008   • [place] (hard of hearing)     slight hearing loss   • Injury of back due to fall     fell in the [employer] into a boat   • Melanoma     right side of face on the chin   • Pacemaker    • Radiation exposure screen     was exposed to the hydrogen bomb   • Snores        Past Surgical History:   Procedure Laterality Date   • CARDIAC ELECTROPHYSIOLOGY PROCEDURE N/A 09/10/2018    Procedure: Pacemaker DC new;  Surgeon: Donnell Pineda MD;  Location: [facility] INVASIVE LOCATION;  Service: Cardiology   • COLONOSCOPY     • EYE SURGERY Bilateral     cataracts removed   • HERNIA REPAIR  10/20/1997    inguinal left side   • JOINT REPLACEMENT Bilateral     partial knee replacement   • KNEE SURGERY Left    • PROSTATE SURGERY  03/07/2023    Urolift   • SKIN LESION EXCISION Right 05/20/2019    Procedure: WIDE EXCISION MELANOMA RIGHT FACE;  Surgeon: Dawson Charlton MD;  Location: [facility] OR;  Service: General   • TONSILLECTOMY     • TOTAL KNEE ARTHROPLASTY Right 03/06/2014    Right partial total knee replacement         Current Outpatient Medications:   •  Ascorbic Acid (Vitamin C) 500 MG capsule, Take 1,000 mg by mouth Daily., Disp: , Rfl:   •  Calcium-Magnesium-Vitamin D - MG-MG-UNIT tablet sustained-release 24 hour, Take 1 tablet by mouth Daily., Disp: , Rfl:   •  Cholecalciferol (Vitamin D3) 50 MCG (2000 UT) tablet,  "Take  by mouth Daily., Disp: , Rfl:   •  Coenzyme Q10 (COQ10) 400 MG capsule, Take 400 mg by mouth Daily., Disp: , Rfl:   •  colestipol (COLESTID) 1 G tablet, Take 2 tablets by mouth Daily., Disp: , Rfl:   •  Cyanocobalamin (VITAMIN B12 PO), Take  by mouth Daily., Disp: , Rfl:   •  dofetilide (TIKOSYN) 500 MCG capsule, Take 1 capsule by mouth 2 (Two) Times a Day., Disp: 180 capsule, Rfl: 2  •  Eliquis 5 MG tablet tablet, TAKE 1 TABLET EVERY 12     HOURS, Disp: 180 tablet, Rfl: 2  •  levothyroxine (SYNTHROID, LEVOTHROID) 50 MCG tablet, Take 1 tablet by mouth Daily., Disp: , Rfl:   •  Magnesium Oxide 400 (240 Mg) MG tablet, Take 1 tablet by mouth Daily., Disp: 90 tablet, Rfl: 3  •  metoprolol tartrate (LOPRESSOR) 25 MG tablet, Take 1 tablet by mouth 2 (Two) Times a Day., Disp: 180 tablet, Rfl: 3  •  omeprazole (PriLOSEC) 40 MG capsule, Take 1 capsule by mouth Daily., Disp: , Rfl:   •  rosuvastatin (CRESTOR) 20 MG tablet, Take 1 tablet by mouth Daily., Disp: 90 tablet, Rfl: 1  •  triamcinolone (KENALOG) 0.1 % cream, Apply  topically to the appropriate area as directed 2 (Two) Times a Day., Disp: , Rfl:   •  Zinc 50 MG capsule, Take 50 mg by mouth Daily., Disp: , Rfl:      Physical Exam  Visit Vitals  /64   Pulse 71   Temp 97.1 °F (36.2 °C)   Ht 170.2 cm (67\")   Wt 77.1 kg (170 lb)   SpO2 96%   BMI 26.63 kg/m²       Labs  Brief Urine Lab Results  (Last result in the past 365 days)      Color   Clarity   Blood   Leuk Est   Nitrite   Protein   CREAT   Urine HCG        10/25/22 1105 Yellow   Clear   Negative   Negative   Negative   Negative                 Lab Results   Component Value Date    GLUCOSE 95 09/11/2018    CALCIUM 8.4 (L) 09/11/2018     09/11/2018    K 4.0 09/11/2018    CO2 26.0 09/11/2018     09/11/2018    BUN 19 09/11/2018    CREATININE 1.10 02/21/2022    EGFRIFAFRI >59 03/07/2018    EGFRIFNONA 67 09/11/2018    BCR 17.9 09/11/2018    ANIONGAP 8.0 09/11/2018       Lab Results   Component " Value Date    WBC 8.3 09/24/2021    HGB 15.2 09/24/2021    HCT 46.7 09/24/2021    MCV 87.9 09/24/2021     09/24/2021            Lab Results   Component Value Date    PSA 2.10 03/28/2016    PSA 2.26 12/08/2015    PSA 2.00 06/02/2014         PVR  Post-void residual performed with ultrasound scanner by staff and interpreted by me - 0cc    I have reviewed above labs and imaging.     Assessment  87 y.o. male with hx of BPH s/p Urolift 03/07/23.    His IPSS has greatly improved from 26 to 19, but he continues to struggle with urgency/frequency.  He tells me that he does not drink caffeinated drinks and overall likely has some adequate fluid intake, drinking cups of water only when he takes medication throughout the day.  He otherwise admits he manages his constipation with prunes and mostly has a bowel movement daily.  I did advise that he maintain this as constipation will worsen his LUTS.    Otherwise, he has no dysuria or hematuria concerning for UTI.  His symptoms preceded this UroLift and I suspect he has underlying OAB.  As we are uncertain of what side effects he experienced with Myrbetriq, we will do a sample of Gemtesa.    Plan  1.  Start trial of Gemtesa  2.  Rapaflo removed from medications    FU in 4-6 weeks for reassessment and urine sample

## 2023-04-07 RX ORDER — DOFETILIDE 0.5 MG/1
500 CAPSULE ORAL 2 TIMES DAILY
Qty: 180 CAPSULE | Refills: 1 | Status: SHIPPED | OUTPATIENT
Start: 2023-04-07

## 2023-05-18 ENCOUNTER — OFFICE VISIT (OUTPATIENT)
Dept: UROLOGY | Facility: CLINIC | Age: 88
End: 2023-05-18
Payer: MEDICARE

## 2023-05-18 VITALS
DIASTOLIC BLOOD PRESSURE: 64 MMHG | SYSTOLIC BLOOD PRESSURE: 118 MMHG | HEART RATE: 82 BPM | HEIGHT: 67 IN | OXYGEN SATURATION: 99 % | BODY MASS INDEX: 26.68 KG/M2 | TEMPERATURE: 97.2 F | WEIGHT: 170 LBS

## 2023-05-18 DIAGNOSIS — N40.0 BENIGN PROSTATIC HYPERPLASIA, UNSPECIFIED WHETHER LOWER URINARY TRACT SYMPTOMS PRESENT: Primary | ICD-10-CM

## 2023-05-18 NOTE — PROGRESS NOTES
Chief Complaint   Patient presents with   • Benign Prostatic Hypertrophy     6 week fu discuss gemtesa trial        HPI  Mr. Miranda is a 88 y.o. male with history of BPH s/p Urolift 03/07/23  who presents for follow up.     At this visit, notes some mild improvement in urgency and day time frequency on Gemtesa. Otherwise, nocturia with voiding 3-5x per night unchanged.     IPSS Questionnaire (AUA-7):  Incomplete emptying  Over the past month, how often have you had a sensation of not emptying your bladder completely after you finish?: About half the time (05/18/23 1008)  Frequency  Over the past month, how often have you had to urinate again less than two hours after you finishing urinating ?: More than half the time (05/18/23 1008)  Intermittency  Over the past month, how often have you found you stopped and started again several time when you urinated ?: Almost always (05/18/23 1008)  Urgency  Over the last month, how difficult  have you found it to postpone urination ?: Less than 1 time in 5 (05/18/23 1008)  Weak Stream  Over the past month, how often have you had a weak urinary stream ?: Less than 1 time in 5 (05/18/23 1008)  Straining  Over the past month, how often have you had to push or strain to begin urination ?: Not at all (05/18/23 1008)  Nocturia  Over the past month, how many times did you most typically get up to urinate from the time you went to bed until the time you got up in the morning ?: About half the time (05/18/23 1008)  Quality of life due to urinary symptoms  If you were to spend the rest of your life with your urinary condition the way it is now, how would feel about that?: Mixed - about equally satisfied (05/18/23 1008)    Scores  Total IPSS Score: 16 (05/18/23 1008)       Past Medical History:   Diagnosis Date   • Bradycardia     Recorded in 2008 with a history of intolerance to AV mele blocking agents.   • Calcified mesenteric mass     Recent CT scan, June 2010, by Dr. Aguilera,  unchanged from prior.   • Cataracts, bilateral     removed bilaterally   • Chronic atrial fibrillation     on Eliquis 5 mg b.i.d.    • Delayed emergence from anesthesia    • Disease of thyroid gland     hypothyroidism   • Dizziness    • Dyslipidemia     , followed by Dr. Benavidez. on statin therapy.   • Enlarged prostate    • Eye tearing, left    • GERD (gastroesophageal reflux disease)    • Tommy's disease    • Hard stool    • History of shingles 2008   • [place] (hard of hearing)     slight hearing loss   • Injury of back due to fall     fell in the [employer] into a boat   • Melanoma     right side of face on the chin   • Pacemaker    • Radiation exposure screen     was exposed to the hydrogen bomb   • Snores        Past Surgical History:   Procedure Laterality Date   • CARDIAC ELECTROPHYSIOLOGY PROCEDURE N/A 09/10/2018    Procedure: Pacemaker DC new;  Surgeon: Donnell Pineda MD;  Location: [facility] INVASIVE LOCATION;  Service: Cardiology   • COLONOSCOPY     • EYE SURGERY Bilateral     cataracts removed   • HERNIA REPAIR  10/20/1997    inguinal left side   • JOINT REPLACEMENT Bilateral     partial knee replacement   • KNEE SURGERY Left    • PROSTATE SURGERY  03/07/2023    Urolift   • SKIN LESION EXCISION Right 05/20/2019    Procedure: WIDE EXCISION MELANOMA RIGHT FACE;  Surgeon: Dawson Charlton MD;  Location: [facility] OR;  Service: General   • TONSILLECTOMY     • TOTAL KNEE ARTHROPLASTY Right 03/06/2014    Right partial total knee replacement         Current Outpatient Medications:   •  Ascorbic Acid (Vitamin C) 500 MG capsule, Take 1,000 mg by mouth Daily., Disp: , Rfl:   •  Calcium-Magnesium-Vitamin D - MG-MG-UNIT tablet sustained-release 24 hour, Take 1 tablet by mouth Daily., Disp: , Rfl:   •  Cholecalciferol (Vitamin D3) 50 MCG (2000 UT) tablet, Take  by mouth Daily., Disp: , Rfl:   •  Coenzyme Q10 (COQ10) 400 MG capsule, Take 400 mg by mouth Daily., Disp: , Rfl:   •  colestipol (COLESTID) 1 G tablet,  "Take 2 tablets by mouth Daily., Disp: , Rfl:   •  Cyanocobalamin (VITAMIN B12 PO), Take  by mouth Daily., Disp: , Rfl:   •  dofetilide (TIKOSYN) 500 MCG capsule, Take 1 capsule by mouth 2 (Two) Times a Day., Disp: 180 capsule, Rfl: 1  •  Eliquis 5 MG tablet tablet, TAKE 1 TABLET EVERY 12     HOURS, Disp: 180 tablet, Rfl: 2  •  levothyroxine (SYNTHROID, LEVOTHROID) 50 MCG tablet, Take 1 tablet by mouth Daily., Disp: , Rfl:   •  Magnesium Oxide 400 (240 Mg) MG tablet, Take 1 tablet by mouth Daily., Disp: 90 tablet, Rfl: 3  •  metoprolol tartrate (LOPRESSOR) 25 MG tablet, Take 1 tablet by mouth 2 (Two) Times a Day., Disp: 180 tablet, Rfl: 3  •  omeprazole (PriLOSEC) 40 MG capsule, Take 1 capsule by mouth Daily., Disp: , Rfl:   •  rosuvastatin (CRESTOR) 20 MG tablet, Take 1 tablet by mouth Daily., Disp: 90 tablet, Rfl: 1  •  triamcinolone (KENALOG) 0.1 % cream, Apply  topically to the appropriate area as directed 2 (Two) Times a Day., Disp: , Rfl:   •  Zinc 50 MG capsule, Take 50 mg by mouth Daily., Disp: , Rfl:      Physical Exam  Visit Vitals  /64 (BP Location: Left arm, Patient Position: Sitting, Cuff Size: Adult)   Pulse 82   Temp 97.2 °F (36.2 °C) (Temporal)   Ht 170.2 cm (67\")   Wt 77.1 kg (170 lb)   SpO2 99%   BMI 26.63 kg/m²       Labs  Brief Urine Lab Results  (Last result in the past 365 days)      Color   Clarity   Blood   Leuk Est   Nitrite   Protein   CREAT   Urine HCG        05/18/23 1013 Yellow   Clear   Negative   Negative   Negative   Negative                 Lab Results   Component Value Date    GLUCOSE 95 09/11/2018    CALCIUM 8.4 (L) 09/11/2018     09/11/2018    K 4.0 09/11/2018    CO2 26.0 09/11/2018     09/11/2018    BUN 19 09/11/2018    CREATININE 1.10 02/21/2022    EGFRIFAFRI >59 03/07/2018    EGFRIFNONA 67 09/11/2018    BCR 17.9 09/11/2018    ANIONGAP 8.0 09/11/2018       Lab Results   Component Value Date    WBC 8.3 09/24/2021    HGB 15.2 09/24/2021    HCT 46.7 09/24/2021    " MCV 87.9 09/24/2021     09/24/2021            Lab Results   Component Value Date    PSA 2.10 03/28/2016    PSA 2.26 12/08/2015    PSA 2.00 06/02/2014       Assessment  88 y.o. male with BPH s/p Urolift 03/07/23.    He completed a trial of Gemtesa and noticed a predictable improvement in urgency and daytime frequency.  Nocturia however remains.  He tells me that he urinates into a urinal and produces approximately 750 to 1000 mL of urine overnight.  Given his small volume of fluids in the p.m. hours, I suspect he actually has nocturnal polyuria and would benefit from a trial of low-dose DDAVP.    Previous labs from PCP and electronic medical record from October 2022 reveal a GFR of 67 and a sodium of 138.    Plan  1.  Gemtesa complete; can resume if urgency returns  2.  Trial of Nocdurna

## 2023-05-22 ENCOUNTER — TELEPHONE (OUTPATIENT)
Dept: UROLOGY | Facility: CLINIC | Age: 88
End: 2023-05-22

## 2023-05-22 RX ORDER — APIXABAN 5 MG/1
TABLET, FILM COATED ORAL
Qty: 180 TABLET | Refills: 2 | Status: SHIPPED | OUTPATIENT
Start: 2023-05-22

## 2023-05-22 NOTE — TELEPHONE ENCOUNTER
Hub staff attempted to follow warm transfer process and was unsuccessful     Caller: JOSE MANUEL IBANEZ     Relationship to patient: SELF     Best call back number: [phone]    Patient is needing: PT IS NEEDING SOMEONE TO GIVE HIM A CALLBACK. HE IS HAVING ISSUES WITH HIS MEDICATION. NEEDS TO KNOW IF HE NEEDS TO CONTINUE TO TAKE IT. PT SAID HE TOOK IT 2 DAYS AND IT STOPPED HIM FROM URINATING.

## 2023-05-22 NOTE — TELEPHONE ENCOUNTER
Called patient and he reports Nocdurna is causing him to not be able to urinate.  He states he drank 51 ounces between 8 AM and 6 PM and only put out 14 mL of urine during that time.  I advised patient to stop Nocdurna and keep his follow-up appointment with Aristides to further discuss

## 2023-06-02 ENCOUNTER — OFFICE VISIT (OUTPATIENT)
Dept: UROLOGY | Facility: CLINIC | Age: 88
End: 2023-06-02

## 2023-06-02 VITALS
SYSTOLIC BLOOD PRESSURE: 124 MMHG | HEART RATE: 72 BPM | DIASTOLIC BLOOD PRESSURE: 68 MMHG | BODY MASS INDEX: 26.68 KG/M2 | HEIGHT: 67 IN | OXYGEN SATURATION: 97 % | TEMPERATURE: 97.4 F | WEIGHT: 170 LBS

## 2023-06-02 DIAGNOSIS — N40.0 BENIGN PROSTATIC HYPERPLASIA, UNSPECIFIED WHETHER LOWER URINARY TRACT SYMPTOMS PRESENT: Primary | ICD-10-CM

## 2023-06-02 NOTE — PROGRESS NOTES
Chief Complaint   Patient presents with   • Benign Prostatic Hypertrophy   • Follow-up        HPI  Mr. Miranda is a 88 y.o. male with history of BPH s/p Urolift 03/07/23 who presents for follow up.     At this visit, took Nocdurna for 2 days.  The first night, he noticed his urination was much improved.  However, his decreased urine output continued throughout the course of the day.  Despite this, he repeated the dose the next night.  Again, nocturia was much improved.  However, it took until the next day before he really produced urine output that matched his intake.  He therefore stopped Nocdurna.    No change in diet, no constipation, no change in medications.    Past Medical History:   Diagnosis Date   • Bradycardia     Recorded in 2008 with a history of intolerance to AV mele blocking agents.   • Calcified mesenteric mass     Recent CT scan, June 2010, by Dr. Aguilera, unchanged from prior.   • Cataracts, bilateral     removed bilaterally   • Chronic atrial fibrillation     on Eliquis 5 mg b.i.d.    • Delayed emergence from anesthesia    • Disease of thyroid gland     hypothyroidism   • Dizziness    • Dyslipidemia     , followed by Dr. Benavidez. on statin therapy.   • Enlarged prostate    • Eye tearing, left    • GERD (gastroesophageal reflux disease)    • Tommy's disease    • Hard stool    • History of shingles 2008   • [place] (hard of hearing)     slight hearing loss   • Injury of back due to fall     fell in the [employer] into a boat   • Melanoma     right side of face on the chin   • Pacemaker    • Radiation exposure screen     was exposed to the hydrogen bomb   • Snores        Past Surgical History:   Procedure Laterality Date   • CARDIAC ELECTROPHYSIOLOGY PROCEDURE N/A 09/10/2018    Procedure: Pacemaker DC new;  Surgeon: Donnell Pineda MD;  Location: [facility] INVASIVE LOCATION;  Service: Cardiology   • COLONOSCOPY     • EYE SURGERY Bilateral     cataracts removed   • HERNIA REPAIR  10/20/1997    inguinal left  side   • JOINT REPLACEMENT Bilateral     partial knee replacement   • KNEE SURGERY Left    • PROSTATE SURGERY  03/07/2023    Urolift   • SKIN LESION EXCISION Right 05/20/2019    Procedure: WIDE EXCISION MELANOMA RIGHT FACE;  Surgeon: Dawson Charlton MD;  Location: [facility];  Service: General   • TONSILLECTOMY     • TOTAL KNEE ARTHROPLASTY Right 03/06/2014    Right partial total knee replacement         Current Outpatient Medications:   •  Ascorbic Acid (Vitamin C) 500 MG capsule, Take 1,000 mg by mouth Daily., Disp: , Rfl:   •  Calcium-Magnesium-Vitamin D - MG-MG-UNIT tablet sustained-release 24 hour, Take 1 tablet by mouth Daily., Disp: , Rfl:   •  Cholecalciferol (Vitamin D3) 50 MCG (2000 UT) tablet, Take  by mouth Daily., Disp: , Rfl:   •  Coenzyme Q10 (COQ10) 400 MG capsule, Take 400 mg by mouth Daily., Disp: , Rfl:   •  colestipol (COLESTID) 1 G tablet, Take 2 tablets by mouth Daily., Disp: , Rfl:   •  Cyanocobalamin (VITAMIN B12 PO), Take  by mouth Daily., Disp: , Rfl:   •  dofetilide (TIKOSYN) 500 MCG capsule, Take 1 capsule by mouth 2 (Two) Times a Day., Disp: 180 capsule, Rfl: 1  •  Eliquis 5 MG tablet tablet, TAKE 1 TABLET EVERY 12     HOURS, Disp: 180 tablet, Rfl: 2  •  levothyroxine (SYNTHROID, LEVOTHROID) 50 MCG tablet, Take 1 tablet by mouth Daily., Disp: , Rfl:   •  Magnesium Oxide 400 (240 Mg) MG tablet, Take 1 tablet by mouth Daily., Disp: 90 tablet, Rfl: 3  •  metoprolol tartrate (LOPRESSOR) 25 MG tablet, Take 1 tablet by mouth 2 (Two) Times a Day., Disp: 180 tablet, Rfl: 3  •  omeprazole (PriLOSEC) 40 MG capsule, Take 1 capsule by mouth Daily., Disp: , Rfl:   •  rosuvastatin (CRESTOR) 20 MG tablet, Take 1 tablet by mouth Daily., Disp: 90 tablet, Rfl: 1  •  triamcinolone (KENALOG) 0.1 % cream, Apply  topically to the appropriate area as directed 2 (Two) Times a Day., Disp: , Rfl:   •  Zinc 50 MG capsule, Take 50 mg by mouth Daily., Disp: , Rfl:      Physical Exam  Visit Vitals  BP  "124/68 (BP Location: Left arm, Patient Position: Sitting, Cuff Size: Adult)   Pulse 72   Temp 97.4 °F (36.3 °C) (Temporal)   Ht 170.2 cm (67\")   Wt 77.1 kg (170 lb)   SpO2 97%   BMI 26.63 kg/m²       Labs  Brief Urine Lab Results  (Last result in the past 365 days)      Color   Clarity   Blood   Leuk Est   Nitrite   Protein   CREAT   Urine HCG        06/02/23 1110 Yellow   Clear   Negative   Negative   Negative   Negative                 Lab Results   Component Value Date    GLUCOSE 95 09/11/2018    CALCIUM 8.4 (L) 09/11/2018     09/11/2018    K 4.0 09/11/2018    CO2 26.0 09/11/2018     09/11/2018    BUN 19 09/11/2018    CREATININE 1.10 02/21/2022    EGFRIFAFRI >59 03/07/2018    EGFRIFNONA 67 09/11/2018    BCR 17.9 09/11/2018    ANIONGAP 8.0 09/11/2018       Lab Results   Component Value Date    WBC 8.3 09/24/2021    HGB 15.2 09/24/2021    HCT 46.7 09/24/2021    MCV 87.9 09/24/2021     09/24/2021            Lab Results   Component Value Date    PSA 2.10 03/28/2016    PSA 2.26 12/08/2015    PSA 2.00 06/02/2014         Assessment  88 y.o. male with BPH s/p Urolift 03/07/23.    At our last visit, he had told me his nocturnal volumes were 750 to 1000 mL, though this had not been strictly documented.  Since a 2-day trial of Nocdurna, his nocturnal volumes continue to be much less, estimated to be less than 500 cc.  He has not been on it for over 72 hours and this continues to be the case.  With that in mind, he is now waking up 3-4 times a night for very small volumes, which would warrant a very different treatment pathway than nocturnal polyuria.    The patient, his wife, and I discussed that with these very different volumes reported, our treatment pathway is not clear.  They are agreeable to performing a bladder diary for at least 3 days to better understand daytime and nighttime output.      Plan  1. Bladder diary x3 days    Tx based on results      "

## 2023-06-07 ENCOUNTER — DOCUMENTATION (OUTPATIENT)
Dept: UROLOGY | Facility: CLINIC | Age: 88
End: 2023-06-07
Payer: MEDICARE

## 2023-06-07 DIAGNOSIS — N32.81 OVERACTIVE BLADDER: Primary | ICD-10-CM

## 2023-06-07 RX ORDER — TROSPIUM CHLORIDE ER 60 MG/1
60 CAPSULE ORAL EVERY MORNING
Qty: 30 CAPSULE | Refills: 11 | Status: SHIPPED | OUTPATIENT
Start: 2023-06-07

## 2023-06-07 NOTE — PROGRESS NOTES
88-year-old male with history of BPH s/p Urolift 03/07/23.    Patient completed a bladder diary to better elucidate OAB versus nocturnal polyuria.  He dropped these results off to our office and my interpretation is as follows:    Starting at 7:35 AM on Lupe 3, 2023, the patient produced a total of 510 mL up until 8 PM.  From 8 PM until 530am, he produced 420 mL.  Total 24-hour output of 930 mL.  Greater than 33% of the patients urine output took place overnight (33% would be 306mL and patient had 420mL).  However, the patient's voids were extremely frequent, on average about every hour during the day and the night.  Sometimes even more frequent.  He voided 9 times during the day and 6 times from 8 PM till 5:30 AM.      With that in mind, the patient clearly has OAB as well as nocturnal polyuria.  Given that his OAB is a 24-hour problem, we will reinitiate treatment for OAB.  He has previously done a trial of beta agonist with mild improvement in daytime frequency, overall not much change.  I have interpreted these results for both the patient and his wife by phone.  I have discussed with him the plan of treating OAB with anticholinergic and they are aware of the possible side effects of this medication.  We will start trospium to limit risk of cognitive side effects.    Bladder diary will be scanned into the patient's media tab.

## 2023-07-20 ENCOUNTER — HOSPITAL ENCOUNTER (OUTPATIENT)
Facility: HOSPITAL | Age: 88
Discharge: HOME | End: 2023-07-20
Payer: MEDICARE

## 2023-07-20 PROCEDURE — 93005 ELECTROCARDIOGRAM TRACING: CPT

## 2023-09-27 RX ORDER — DOFETILIDE 0.5 MG/1
CAPSULE ORAL
Qty: 180 CAPSULE | Refills: 2 | Status: SHIPPED | OUTPATIENT
Start: 2023-09-27

## 2024-08-14 NOTE — PROGRESS NOTES
[facility] Cardiology    Patient ID: Sly Miranda is a 89 y.o. male.  : 1935   Contact: [phone]    Encounter date: 08/15/2024    PCP: Ahmet Benavidez MD      Chief complaint:   Chief Complaint   Patient presents with    Proxysmal atrial fibrillation    Leg Swelling       Problem List:  Paroxysmal atrial fibrillation:  MAXIM/ECV, 10/22/2013, Dr. Huynh: EF 55%. Trace TR, mild MR. Successful ECV to sinus rhythm. Eliquis initiated with propafenone 150 mg q.12 h.   EKG, 2013, revealing NSR at a rate of 64 bpm with a normal  QTC.   CHADS-VASc=2 (Age>75).  MAXIM in preparation for cardioversion, 2013, Dr. Huynh: EF in the lower limits of normal with a large amount of spontaneous contrast and the appearance of loosely formed thrombus in the left atrial appendage. Eliquis initiated.  Propafenone discontinued 2013 secondary to ineffectiveness.   Echo, 2018: EF 50-55%. Trace-to-mild MR. Mild TR.  CT Angio of chest, 2018: no evidence of pulmonary embolus.  Holter, 2018: Bradycardia with long pauses.  Holter, 2021: Monitored for 5 days. Paced rhythm. First degree AVB. AF 0.1%, AFL 9.1%.  episodes, longest 19 beats @ average 136 bpm up to 163 bpm. PAC 2.6%. PVC 1.1%.  Echo, 2022; EF 55%. Mild MR. Aortic valve is trileaflet and sclerotic. Mild TR.   Bradycardia:  Recorded in  with a history of intolerance  to AV mele blocking agents.  Insertion of [employer] DDDR Pacemaker, 09/10/2018: PM 2272  SN [id number].  Dyslipidemia, on statin therapy.  Abdominal pain prompting admission to [facility], 2013:   CT scan of the abdomen and pelvis showing no acute abnormality, 2013.  Mesenteric calcified mass:  Recent CT scan, 2010,  by Dr. Aguilera, unchanged from prior.  Solitary pulmonary nodule:  X-ray chest pa and lateral, 2016: Chronic change; no active disease.  GERD.  Surgical history:  Hernia  surgery, 10/20/1997.  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 Diaz.    No Known Allergies    Current Medications:    Current Outpatient Medications:     apixaban (Eliquis) 5 MG tablet tablet, Take 1 tablet by mouth Every 12 (Twelve) Hours., Disp: 180 tablet, Rfl: 2    Ascorbic Acid (Vitamin C) 500 MG capsule, Take 1,000 mg by mouth Daily., Disp: , Rfl:     Calcium-Magnesium-Vitamin D - MG-MG-UNIT tablet sustained-release 24 hour, Take 1 tablet by mouth Daily., Disp: , Rfl:     Cholecalciferol (Vitamin D3) 50 MCG (2000 UT) tablet, Take  by mouth Daily., Disp: , Rfl:     Coenzyme Q10 (COQ10) 400 MG capsule, Take 400 mg by mouth Daily., Disp: , Rfl:     colestipol (COLESTID) 1 G tablet, Take 2 tablets by mouth Daily., Disp: , Rfl:     Cyanocobalamin (VITAMIN B12 PO), Take  by mouth Daily., Disp: , Rfl:     dofetilide (TIKOSYN) 500 MCG capsule, TAKE ONE CAPSULE BY MOUTH TWICE A DAY, Disp: 180 capsule, Rfl: 2    levothyroxine (SYNTHROID, LEVOTHROID) 50 MCG tablet, Take 1 tablet by mouth Daily., Disp: , Rfl:     Magnesium Oxide 400 (240 Mg) MG tablet, Take 1 tablet by mouth Daily., Disp: 90 tablet, Rfl: 3    metoprolol tartrate (LOPRESSOR) 25 MG tablet, TAKE ONE TABLET BY MOUTH EVERY 12 HOURS, Disp: 180 tablet, Rfl: 3    niacin (NIASPAN) 500 MG CR tablet, Take 1 tablet by mouth As Needed. 2 times a week, Disp: , Rfl:     omeprazole (PriLOSEC) 40 MG capsule, Take 1 capsule by mouth Daily., Disp: , Rfl:     rosuvastatin (CRESTOR) 20 MG tablet, Take 1 tablet by mouth Daily., Disp: 90 tablet, Rfl: 1    triamcinolone (KENALOG) 0.1 % cream, Apply  topically to the appropriate area as directed 2 (Two) Times a Day., Disp: , Rfl:     Zinc 50 MG capsule, Take 50 mg by mouth Daily., Disp: , Rfl:     furosemide (LASIX) 20 MG tablet, Take 0.5 tablets by mouth Daily. Prn edema (Patient not taking: Reported on 8/15/2024), Disp: 30 tablet, Rfl: 11    HPI    Sly Miranda is a 89 y.o. male who presents  "today for a follow up of PAF, tachy-chloé syndrome, and cardiac risk factors. Since last visit, patient has been doing well overall from a cardiovascular standpoint. He stays busy and active by going on 15 minute walks and doing yard work. Patient notes some swelling in his legs. He tries to avoid salt in his diet. Patient reports he frequently experiences dizziness. He recently had routine blood work done with his PCP. Patient denies chest pain, shortness of breath, orthopnea, palpitations, dizziness, and syncope.       The following portions of the patient's history were reviewed and updated as appropriate: allergies, current medications and problem list.    Pertinent positives as listed in the HPI.  All other systems reviewed are negative.         Vitals:    08/15/24 1130   BP: 128/62   BP Location: Left arm   Patient Position: Sitting   Pulse: 70   SpO2: 96%   Weight: 78 kg (172 lb)   Height: 170.2 cm (67\")       Physical Exam:  General: Alert and oriented.  Neck: Jugular venous pressure is within normal limits. Carotids have normal upstrokes without bruits.   Cardiovascular: Heart has a nondisplaced focal PMI. Regular rate and rhythm. No murmur, gallop or rub.  Lungs: Clear, no rales or wheezes. Equal expansion is noted.   Extremities: Show no edema.  Skin: Warm and dry.  Neurologic: Nonfocal.     Diagnostic Data (reviewed with patient):  No recent laboratory studies available for review today.    Advance Care Planning   ACP discussion was held with the patient during this visit. Patient does not have an advance directive, declines further assistance.           ECG 12 Lead    Date/Time: 8/15/2024 11:41 AM  Performed by: Ana Huynh MD    Authorized by: Ana Huynh MD  Comparison: compared with previous ECG from 7/20/2023  Similar to previous ECG  Conduction: left anterior fascicular block  Other findings: non-specific ST-T wave changes    Clinical impression: abnormal EKG  Comments: " Atrial paced with prolonged AV conduction      DEVICE INTERROGATION:  /2016, PPM: RA pacing 97%, RV pacing 9.9%. P wave is 1.6 mV with a threshold of 0.62 V at 0.5 msec and an impedance of 320 ohms. R wave is 9.7 mV with a threshold of 0.5 V at 0.5 msec and an impedance of 860 ohms. Battery voltage is 3.8 years. Events: <1% AF longest 1 hr (10/24/2023).        Assessment:    ICD-10-CM ICD-9-CM   1. Paroxysmal atrial fibrillation  I48.0 427.31   2. Tachy-chloé syndrome  I49.5 427.81   3. Dyslipidemia  E78.5 272.4   4. Pacemaker  Z95.0 V45.01         Plan:  Patient was encouraged to continue to be active and have a healthy diet.  Continue on Eliquis 5 mg BID for stroke prophylaxis.   Continue on metoprolol 25 mg BID for rate control and hypertension.   Continue on rosuvastatin 20 mg daily for hyperlipidemia.    Continue dofetilide for rhythm control.  Continue all other current medications.  F/up in 12 months, sooner if needed.      Scribed for Ana Huynh MD by Misael Wheeler. 8/15/2024 11:33 EDT    I Ana Huynh MD personally performed the services described in this documentation as scribed by the above individual in my presence, and it is both accurate and complete.    Ana Huynh MD, [facility]

## 2024-08-15 ENCOUNTER — OFFICE VISIT (OUTPATIENT)
Dept: CARDIOLOGY | Facility: CLINIC | Age: 89
End: 2024-08-15
Payer: MEDICARE

## 2024-08-15 ENCOUNTER — HOSPITAL ENCOUNTER (OUTPATIENT)
Facility: HOSPITAL | Age: 89
Discharge: HOME | End: 2024-08-15
Payer: MEDICARE

## 2024-08-15 VITALS
HEART RATE: 70 BPM | OXYGEN SATURATION: 96 % | WEIGHT: 172 LBS | BODY MASS INDEX: 27 KG/M2 | HEIGHT: 67 IN | DIASTOLIC BLOOD PRESSURE: 62 MMHG | SYSTOLIC BLOOD PRESSURE: 128 MMHG

## 2024-08-15 DIAGNOSIS — E78.5 DYSLIPIDEMIA: ICD-10-CM

## 2024-08-15 DIAGNOSIS — Z95.0 PACEMAKER: ICD-10-CM

## 2024-08-15 DIAGNOSIS — I48.0 PAROXYSMAL ATRIAL FIBRILLATION: Primary | ICD-10-CM

## 2024-08-15 DIAGNOSIS — I49.5 TACHY-BRADY SYNDROME: ICD-10-CM

## 2024-08-15 PROCEDURE — 93005 ELECTROCARDIOGRAM TRACING: CPT

## 2024-08-15 RX ORDER — NIACIN 500 MG/1
500 TABLET, EXTENDED RELEASE ORAL AS NEEDED
COMMUNITY

## 2024-09-30 DIAGNOSIS — I48.0 PAROXYSMAL ATRIAL FIBRILLATION: Primary | ICD-10-CM

## 2024-09-30 RX ORDER — DOFETILIDE 0.5 MG/1
500 CAPSULE ORAL 2 TIMES DAILY
Qty: 180 CAPSULE | Refills: 3 | Status: SHIPPED | OUTPATIENT
Start: 2024-09-30

## 2024-10-18 ENCOUNTER — HOSPITAL ENCOUNTER (OUTPATIENT)
Dept: CT IMAGING | Facility: HOSPITAL | Age: 89
Discharge: HOME | End: 2024-10-18
Payer: MEDICARE

## 2024-10-18 DIAGNOSIS — R42 DIZZINESS: ICD-10-CM

## 2024-10-18 DIAGNOSIS — R51.9 HEADACHE, UNSPECIFIED HEADACHE TYPE: ICD-10-CM

## 2024-10-18 PROCEDURE — 6360000004 HC RX CONTRAST MEDICATION: Performed by: FAMILY MEDICINE

## 2024-10-18 PROCEDURE — 70460 CT HEAD/BRAIN W/DYE: CPT

## 2024-10-18 RX ORDER — IOPAMIDOL 755 MG/ML
100 INJECTION, SOLUTION INTRAVASCULAR
Status: COMPLETED | OUTPATIENT
Start: 2024-10-18 | End: 2024-10-18

## 2024-10-18 RX ADMIN — IOPAMIDOL 50 ML: 755 INJECTION, SOLUTION INTRAVENOUS at 09:41

## 2024-10-30 PROCEDURE — 93294 REM INTERROG EVL PM/LDLS PM: CPT | Performed by: INTERNAL MEDICINE

## 2024-10-30 PROCEDURE — 93296 REM INTERROG EVL PM/IDS: CPT | Performed by: INTERNAL MEDICINE

## 2025-04-11 RX ORDER — METOPROLOL TARTRATE 25 MG/1
25 TABLET, FILM COATED ORAL EVERY 12 HOURS SCHEDULED
Qty: 180 TABLET | Refills: 1 | Status: SHIPPED | OUTPATIENT
Start: 2025-04-11

## 2025-07-03 DIAGNOSIS — I48.0 PAROXYSMAL ATRIAL FIBRILLATION: ICD-10-CM

## 2025-07-03 LAB
MDC_IDC_MSMT_BATTERY_REMAINING_LONGEVITY: 36 MO
MDC_IDC_MSMT_BATTERY_REMAINING_PERCENTAGE: 33 %
MDC_IDC_MSMT_BATTERY_RRT_TRIGGER: 2.6
MDC_IDC_MSMT_BATTERY_STATUS: NORMAL
MDC_IDC_MSMT_BATTERY_VOLTAGE: 2.95
MDC_IDC_MSMT_LEADCHNL_RA_DTM: NORMAL
MDC_IDC_MSMT_LEADCHNL_RA_IMPEDANCE_VALUE: 380
MDC_IDC_MSMT_LEADCHNL_RA_PACING_THRESHOLD_AMPLITUDE: 0.62
MDC_IDC_MSMT_LEADCHNL_RA_PACING_THRESHOLD_POLARITY: NORMAL
MDC_IDC_MSMT_LEADCHNL_RA_PACING_THRESHOLD_PULSEWIDTH: 0.5
MDC_IDC_MSMT_LEADCHNL_RA_SENSING_INTR_AMPL: 1.2
MDC_IDC_MSMT_LEADCHNL_RV_IMPEDANCE_VALUE: 840
MDC_IDC_MSMT_LEADCHNL_RV_PACING_THRESHOLD_POLARITY: NORMAL
MDC_IDC_MSMT_LEADCHNL_RV_SENSING_INTR_AMPL: 9.9
MDC_IDC_PG_IMPLANT_DTM: NORMAL
MDC_IDC_PG_MFG: NORMAL
MDC_IDC_PG_MODEL: NORMAL
MDC_IDC_PG_SERIAL: NORMAL
MDC_IDC_PG_TYPE: NORMAL
MDC_IDC_SESS_DTM: NORMAL
MDC_IDC_SESS_TYPE: NORMAL
MDC_IDC_SET_BRADY_AT_MODE_SWITCH_RATE: 180
MDC_IDC_SET_BRADY_LOWRATE: 70
MDC_IDC_SET_BRADY_MAX_SENSOR_RATE: 115
MDC_IDC_SET_BRADY_MAX_TRACKING_RATE: 125
MDC_IDC_SET_BRADY_MODE: NORMAL
MDC_IDC_SET_BRADY_PAV_DELAY: 300
MDC_IDC_SET_BRADY_SAV_DELAY: 300
MDC_IDC_SET_LEADCHNL_RA_PACING_AMPLITUDE: 1.62
MDC_IDC_SET_LEADCHNL_RA_PACING_POLARITY: NORMAL
MDC_IDC_SET_LEADCHNL_RA_PACING_PULSEWIDTH: 0.5
MDC_IDC_SET_LEADCHNL_RA_SENSING_POLARITY: NORMAL
MDC_IDC_SET_LEADCHNL_RA_SENSING_SENSITIVITY: 0.4
MDC_IDC_SET_LEADCHNL_RV_PACING_AMPLITUDE: 2
MDC_IDC_SET_LEADCHNL_RV_PACING_POLARITY: NORMAL
MDC_IDC_SET_LEADCHNL_RV_PACING_PULSEWIDTH: 0.5
MDC_IDC_SET_LEADCHNL_RV_SENSING_POLARITY: NORMAL
MDC_IDC_SET_LEADCHNL_RV_SENSING_SENSITIVITY: 2
MDC_IDC_STAT_AT_BURDEN_PERCENT: 1
MDC_IDC_STAT_BRADY_RA_PERCENT_PACED: 97
MDC_IDC_STAT_BRADY_RV_PERCENT_PACED: 16

## 2025-07-03 RX ORDER — DOFETILIDE 0.5 MG/1
500 CAPSULE ORAL 2 TIMES DAILY
Qty: 60 CAPSULE | Refills: 0 | Status: SHIPPED | OUTPATIENT
Start: 2025-07-03

## 2025-07-14 RX ORDER — METOPROLOL TARTRATE 25 MG/1
25 TABLET, FILM COATED ORAL EVERY 12 HOURS SCHEDULED
Qty: 180 TABLET | Refills: 3 | Status: SHIPPED | OUTPATIENT
Start: 2025-07-14

## 2025-07-30 LAB
MDC_IDC_MSMT_BATTERY_REMAINING_LONGEVITY: 35 MO
MDC_IDC_MSMT_BATTERY_REMAINING_PERCENTAGE: 32 %
MDC_IDC_MSMT_BATTERY_RRT_TRIGGER: 2.6
MDC_IDC_MSMT_BATTERY_STATUS: NORMAL
MDC_IDC_MSMT_BATTERY_VOLTAGE: 2.95
MDC_IDC_MSMT_LEADCHNL_RA_DTM: NORMAL
MDC_IDC_MSMT_LEADCHNL_RA_IMPEDANCE_VALUE: 360
MDC_IDC_MSMT_LEADCHNL_RA_PACING_THRESHOLD_AMPLITUDE: 0.5
MDC_IDC_MSMT_LEADCHNL_RA_PACING_THRESHOLD_POLARITY: NORMAL
MDC_IDC_MSMT_LEADCHNL_RA_PACING_THRESHOLD_PULSEWIDTH: 0.5
MDC_IDC_MSMT_LEADCHNL_RA_SENSING_INTR_AMPL: 1.1
MDC_IDC_MSMT_LEADCHNL_RV_IMPEDANCE_VALUE: 850
MDC_IDC_MSMT_LEADCHNL_RV_PACING_THRESHOLD_POLARITY: NORMAL
MDC_IDC_MSMT_LEADCHNL_RV_SENSING_INTR_AMPL: 10.5
MDC_IDC_PG_IMPLANT_DTM: NORMAL
MDC_IDC_PG_MFG: NORMAL
MDC_IDC_PG_MODEL: NORMAL
MDC_IDC_PG_SERIAL: NORMAL
MDC_IDC_PG_TYPE: NORMAL
MDC_IDC_SESS_DTM: NORMAL
MDC_IDC_SESS_TYPE: NORMAL
MDC_IDC_SET_BRADY_AT_MODE_SWITCH_RATE: 180
MDC_IDC_SET_BRADY_LOWRATE: 70
MDC_IDC_SET_BRADY_MAX_SENSOR_RATE: 115
MDC_IDC_SET_BRADY_MAX_TRACKING_RATE: 125
MDC_IDC_SET_BRADY_MODE: NORMAL
MDC_IDC_SET_BRADY_PAV_DELAY: 300
MDC_IDC_SET_BRADY_SAV_DELAY: 300
MDC_IDC_SET_LEADCHNL_RA_PACING_AMPLITUDE: 1.5
MDC_IDC_SET_LEADCHNL_RA_PACING_POLARITY: NORMAL
MDC_IDC_SET_LEADCHNL_RA_PACING_PULSEWIDTH: 0.5
MDC_IDC_SET_LEADCHNL_RA_SENSING_POLARITY: NORMAL
MDC_IDC_SET_LEADCHNL_RA_SENSING_SENSITIVITY: 0.4
MDC_IDC_SET_LEADCHNL_RV_PACING_AMPLITUDE: 2
MDC_IDC_SET_LEADCHNL_RV_PACING_POLARITY: NORMAL
MDC_IDC_SET_LEADCHNL_RV_PACING_PULSEWIDTH: 0.5
MDC_IDC_SET_LEADCHNL_RV_SENSING_POLARITY: NORMAL
MDC_IDC_SET_LEADCHNL_RV_SENSING_SENSITIVITY: 2
MDC_IDC_STAT_AT_BURDEN_PERCENT: 1
MDC_IDC_STAT_BRADY_RA_PERCENT_PACED: 97
MDC_IDC_STAT_BRADY_RV_PERCENT_PACED: 17

## 2025-08-14 ENCOUNTER — HOSPITAL ENCOUNTER (OUTPATIENT)
Dept: OTHER | Facility: HOSPITAL | Age: 89
Discharge: HOME | End: 2025-08-14
Payer: MEDICARE

## 2025-08-14 ENCOUNTER — OFFICE VISIT (OUTPATIENT)
Dept: CARDIOLOGY | Facility: CLINIC | Age: OVER 89
End: 2025-08-14
Payer: MEDICARE

## 2025-08-14 VITALS
DIASTOLIC BLOOD PRESSURE: 72 MMHG | BODY MASS INDEX: 26.24 KG/M2 | HEART RATE: 85 BPM | WEIGHT: 167.2 LBS | OXYGEN SATURATION: 97 % | HEIGHT: 67 IN | SYSTOLIC BLOOD PRESSURE: 130 MMHG

## 2025-08-14 DIAGNOSIS — E78.5 DYSLIPIDEMIA: ICD-10-CM

## 2025-08-14 DIAGNOSIS — I48.0 PAROXYSMAL ATRIAL FIBRILLATION: Primary | ICD-10-CM

## 2025-08-14 PROCEDURE — 99214 OFFICE O/P EST MOD 30 MIN: CPT | Performed by: INTERNAL MEDICINE

## 2025-08-14 PROCEDURE — 93005 ELECTROCARDIOGRAM TRACING: CPT

## 2025-08-14 PROCEDURE — 93000 ELECTROCARDIOGRAM COMPLETE: CPT | Performed by: INTERNAL MEDICINE

## 2025-08-27 LAB
MDC_IDC_MSMT_BATTERY_REMAINING_LONGEVITY: 35 MO
MDC_IDC_MSMT_BATTERY_REMAINING_PERCENTAGE: 32 %
MDC_IDC_MSMT_BATTERY_RRT_TRIGGER: 2.6
MDC_IDC_MSMT_BATTERY_STATUS: NORMAL
MDC_IDC_MSMT_BATTERY_VOLTAGE: 2.95
MDC_IDC_MSMT_LEADCHNL_RA_DTM: NORMAL
MDC_IDC_MSMT_LEADCHNL_RA_IMPEDANCE_VALUE: 360
MDC_IDC_MSMT_LEADCHNL_RA_PACING_THRESHOLD_AMPLITUDE: 0.5
MDC_IDC_MSMT_LEADCHNL_RA_PACING_THRESHOLD_POLARITY: NORMAL
MDC_IDC_MSMT_LEADCHNL_RA_PACING_THRESHOLD_PULSEWIDTH: 0.5
MDC_IDC_MSMT_LEADCHNL_RA_SENSING_INTR_AMPL: 1.1
MDC_IDC_MSMT_LEADCHNL_RV_IMPEDANCE_VALUE: 850
MDC_IDC_MSMT_LEADCHNL_RV_PACING_THRESHOLD_POLARITY: NORMAL
MDC_IDC_MSMT_LEADCHNL_RV_SENSING_INTR_AMPL: 10.5
MDC_IDC_PG_IMPLANT_DTM: NORMAL
MDC_IDC_PG_MFG: NORMAL
MDC_IDC_PG_MODEL: NORMAL
MDC_IDC_PG_SERIAL: NORMAL
MDC_IDC_PG_TYPE: NORMAL
MDC_IDC_SESS_DTM: NORMAL
MDC_IDC_SESS_TYPE: NORMAL
MDC_IDC_SET_BRADY_AT_MODE_SWITCH_RATE: 180
MDC_IDC_SET_BRADY_LOWRATE: 70
MDC_IDC_SET_BRADY_MAX_SENSOR_RATE: 115
MDC_IDC_SET_BRADY_MAX_TRACKING_RATE: 125
MDC_IDC_SET_BRADY_MODE: NORMAL
MDC_IDC_SET_BRADY_PAV_DELAY: 300
MDC_IDC_SET_BRADY_SAV_DELAY: 300
MDC_IDC_SET_LEADCHNL_RA_PACING_AMPLITUDE: 1.5
MDC_IDC_SET_LEADCHNL_RA_PACING_POLARITY: NORMAL
MDC_IDC_SET_LEADCHNL_RA_PACING_PULSEWIDTH: 0.5
MDC_IDC_SET_LEADCHNL_RA_SENSING_POLARITY: NORMAL
MDC_IDC_SET_LEADCHNL_RA_SENSING_SENSITIVITY: 0.4
MDC_IDC_SET_LEADCHNL_RV_PACING_AMPLITUDE: 2
MDC_IDC_SET_LEADCHNL_RV_PACING_POLARITY: NORMAL
MDC_IDC_SET_LEADCHNL_RV_PACING_PULSEWIDTH: 0.5
MDC_IDC_SET_LEADCHNL_RV_SENSING_POLARITY: NORMAL
MDC_IDC_SET_LEADCHNL_RV_SENSING_SENSITIVITY: 2
MDC_IDC_STAT_AT_BURDEN_PERCENT: 1
MDC_IDC_STAT_BRADY_RA_PERCENT_PACED: 97
MDC_IDC_STAT_BRADY_RV_PERCENT_PACED: 17

## (undated) DEVICE — TRY SKINPREP PVP SCRB W PAINT

## (undated) DEVICE — SUT GUT CHRM 4/0 RB1 27IN U203H

## (undated) DEVICE — SUT ETHLN 6/0 P1 18IN 697G

## (undated) DEVICE — SOL NS 500ML

## (undated) DEVICE — BLD CLIP UNIV SURG GRY

## (undated) DEVICE — LIMB HOLDER, WRIST/ANKLE: Brand: DEROYAL

## (undated) DEVICE — CANN NASL CO2 DIVIDED A/

## (undated) DEVICE — DECANT BG O JET

## (undated) DEVICE — INTRO TEAR AWAY/LVD W/SD PRT 6F 13CM

## (undated) DEVICE — CAUTERY TIP POLISHER: Brand: DEVON

## (undated) DEVICE — DRSNG SURESITE WNDW 4X4.5

## (undated) DEVICE — MEDI-VAC YANKAUER SUCTION HANDLE W/BULBOUS TIP: Brand: CARDINAL HEALTH

## (undated) DEVICE — SOL NACL 0.9PCT 1000ML

## (undated) DEVICE — SET PRIMARY GRVTY 10DP MALE LL 104IN

## (undated) DEVICE — TUBING, SUCTION, 1/4" X 10', STRAIGHT: Brand: MEDLINE

## (undated) DEVICE — MAGNETIC DRAPE: Brand: DEVON

## (undated) DEVICE — SPNG GZ STRL 2S 4X4 12PLY

## (undated) DEVICE — ADULT, W/LG. BACK PAD, RADIOTRANSPARENT ELEMENT AND LEAD WIRE: Brand: DEFIBRILLATION ELECTRODES

## (undated) DEVICE — SUT ETHLN 5/0 PS2 18IN 1666G

## (undated) DEVICE — LEX ELECTRO PHYSIOLOGY: Brand: MEDLINE INDUSTRIES, INC.

## (undated) DEVICE — PENCL E/S HNDSWCH ROCKRBTN HOLSTR 10FT

## (undated) DEVICE — STRIP,CLOSURE,WOUND,MEDI-STRIP,1/2X4: Brand: MEDLINE

## (undated) DEVICE — IRRIGATOR BULB ASEPTO 60CC STRL

## (undated) DEVICE — DRSNG SURG AQUACEL AG 9X15CM

## (undated) DEVICE — ST EXT IV SMARTSITE 2VLV SP M LL 5ML IV1

## (undated) DEVICE — ST INF PRI SMRTSTE 20DRP 2VLV 24ML 117

## (undated) DEVICE — SUT VIC 4/0 SH 27IN J415H

## (undated) DEVICE — PROXIMATE RH ROTATING HEAD SKIN STAPLERS (35 WIDE) CONTAINS 35 STAINLESS STEEL STAPLES: Brand: PROXIMATE

## (undated) DEVICE — DECANTER: Brand: UNBRANDED

## (undated) DEVICE — SUT VIC 3/0 SH 27IN J416H

## (undated) DEVICE — UNDYED BRAIDED (POLYGLACTIN 910), SYNTHETIC ABSORBABLE SUTURE: Brand: COATED VICRYL

## (undated) DEVICE — FLEXIBLE YANKAUER,MEDIUM TIP, NO VACUUM CONTROL: Brand: ARGYLE